# Patient Record
Sex: MALE | Race: WHITE | NOT HISPANIC OR LATINO | URBAN - METROPOLITAN AREA
[De-identification: names, ages, dates, MRNs, and addresses within clinical notes are randomized per-mention and may not be internally consistent; named-entity substitution may affect disease eponyms.]

---

## 2019-04-26 ENCOUNTER — ON CAMPUS - OUTPATIENT (AMBULATORY)
Dept: URBAN - METROPOLITAN AREA HOSPITAL 2 | Facility: HOSPITAL | Age: 54
End: 2019-04-26
Payer: COMMERCIAL

## 2019-04-26 VITALS
OXYGEN SATURATION: 98 % | HEART RATE: 70 BPM | HEART RATE: 89 BPM | DIASTOLIC BLOOD PRESSURE: 59 MMHG | DIASTOLIC BLOOD PRESSURE: 68 MMHG | SYSTOLIC BLOOD PRESSURE: 124 MMHG | DIASTOLIC BLOOD PRESSURE: 67 MMHG | TEMPERATURE: 97.2 F | DIASTOLIC BLOOD PRESSURE: 55 MMHG | SYSTOLIC BLOOD PRESSURE: 107 MMHG | WEIGHT: 176 LBS | SYSTOLIC BLOOD PRESSURE: 84 MMHG | DIASTOLIC BLOOD PRESSURE: 92 MMHG | RESPIRATION RATE: 18 BRPM | OXYGEN SATURATION: 96 % | SYSTOLIC BLOOD PRESSURE: 95 MMHG | HEART RATE: 82 BPM | SYSTOLIC BLOOD PRESSURE: 94 MMHG | HEART RATE: 72 BPM | RESPIRATION RATE: 16 BRPM | OXYGEN SATURATION: 100 % | HEIGHT: 72 IN | DIASTOLIC BLOOD PRESSURE: 83 MMHG | SYSTOLIC BLOOD PRESSURE: 132 MMHG | OXYGEN SATURATION: 99 % | HEART RATE: 88 BPM | HEART RATE: 84 BPM | HEART RATE: 62 BPM | SYSTOLIC BLOOD PRESSURE: 105 MMHG | DIASTOLIC BLOOD PRESSURE: 65 MMHG | SYSTOLIC BLOOD PRESSURE: 118 MMHG

## 2019-04-26 DIAGNOSIS — Z80.0 FAMILY HISTORY OF MALIGNANT NEOPLASM OF DIGESTIVE ORGANS: ICD-10-CM

## 2019-04-26 DIAGNOSIS — K64.1 SECOND DEGREE HEMORRHOIDS: ICD-10-CM

## 2019-04-26 PROCEDURE — 45378 DIAGNOSTIC COLONOSCOPY: CPT | Mod: 33 | Performed by: INTERNAL MEDICINE

## 2020-07-09 ENCOUNTER — APPOINTMENT (OUTPATIENT)
Dept: GENERAL RADIOLOGY | Facility: HOSPITAL | Age: 55
End: 2020-07-09

## 2020-07-09 ENCOUNTER — HOSPITAL ENCOUNTER (EMERGENCY)
Facility: HOSPITAL | Age: 55
Discharge: HOME | End: 2020-07-09
Admitting: EMERGENCY MEDICINE

## 2020-07-09 VITALS
HEART RATE: 83 BPM | HEIGHT: 72 IN | TEMPERATURE: 98.2 F | SYSTOLIC BLOOD PRESSURE: 132 MMHG | OXYGEN SATURATION: 99 % | BODY MASS INDEX: 23.44 KG/M2 | RESPIRATION RATE: 16 BRPM | DIASTOLIC BLOOD PRESSURE: 83 MMHG | WEIGHT: 173.06 LBS
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
## 2020-07-09 DIAGNOSIS — S30.0XXA CONTUSION OF SACRUM, INITIAL ENCOUNTER: ICD-10-CM

## 2020-07-09 PROCEDURE — 99283 EMERGENCY DEPT VISIT LOW MDM: CPT

## 2020-07-09 PROCEDURE — 72220 X-RAY EXAM SACRUM TAILBONE: CPT

## 2020-07-09 RX ORDER — LIDOCAINE 50 MG/G
1 PATCH TOPICAL ONCE
Status: DISCONTINUED | OUTPATIENT
Start: 2020-07-09 | End: 2020-07-09 | Stop reason: HOSPADM

## 2020-07-09 RX ADMIN — LIDOCAINE 1 PATCH: 50 PATCH TOPICAL at 19:11

## 2020-07-09 NOTE — DISCHARGE INSTRUCTIONS
Continue to take over-the-counter Motrin Tylenol for pain as directed.  Apply ice every 2 hours while awake, on for 20 minutes.  Perform back exercises.  Follow-up with your primary care physician for recheck.  If symptoms persist, you may need physical therapy and/or referral for orthopedist.  If anytime you develop urinary or bowel incontinence, numbness in your perineum, prolonged erection return to the ER immediately.

## 2020-07-09 NOTE — ED PROVIDER NOTES
"Subjective   55-year-old male presents status post fall approximately 2 hours prior to arrival after \"slipped on a wet garage floor\".  He reports right SI joint pain with a small deformity under the joint line.  He denies previous fracture to his pelvis.  He denies head nor neck injury.  Denies saddle paresthesia nor urinary bowel incontinence.  Reports the pain is worse with weightbearing at a 4 out of 10 on the pain scale.  He reports that he took Advil 800 mg prior to arrival as well as ice with minimal relief.    1. Location: R SI joint  2. Quality: sore  3. Severity: moderate  4. Worsening factors: weight-bearing  5. Alleviating factors: denies  6. Onset: 2 hours PTA  7. Radiation: posterior thigh  8. Frequency: intermittent  9. Co-morbidities: No past medical history on file.  10. Source: patient            Review of Systems   Musculoskeletal: Positive for arthralgias and gait problem. Negative for myalgias.   Skin: Negative for color change, pallor, rash and wound.   Neurological: Negative for weakness and numbness.   Hematological: Does not bruise/bleed easily.   All other systems reviewed and are negative.      No past medical history on file.    No Known Allergies    No past surgical history on file.    No family history on file.    Social History     Socioeconomic History   • Marital status: Single     Spouse name: Not on file   • Number of children: Not on file   • Years of education: Not on file   • Highest education level: Not on file   Tobacco Use   • Smoking status: Never Smoker   Substance and Sexual Activity   • Drug use: Never   • Sexual activity: Defer           Objective   Physical Exam   Constitutional: He is oriented to person, place, and time. Vital signs are normal. He appears well-developed and well-nourished. He is active and cooperative. No distress.   HENT:   Head: Normocephalic and atraumatic. Head is without raccoon's eyes and without Sauceda's sign.   Right Ear: External ear normal. No " drainage.   Left Ear: External ear normal. No drainage.   Nose: Nose normal.   Neck: Trachea normal, normal range of motion, full passive range of motion without pain and phonation normal. Neck supple.   Cardiovascular: Intact distal pulses and normal pulses.   Pulses:       Dorsalis pedis pulses are 2+ on the right side, and 2+ on the left side.        Posterior tibial pulses are 2+ on the right side, and 2+ on the left side.   Musculoskeletal: Normal range of motion. He exhibits tenderness. He exhibits no edema.        Lumbar back: He exhibits tenderness, bony tenderness, swelling, deformity and pain. He exhibits normal range of motion, no edema, no laceration, no spasm and normal pulse.        Back:    Small deformity noted under the right sacroiliac joint.  There is no overlying erythema nor ecchymosis noted.  Distal pulses strong and equal bilaterally 2+.    Neurological: He is alert and oriented to person, place, and time. He has normal strength and normal reflexes. No sensory deficit. He exhibits normal muscle tone.   Reflex Scores:       Patellar reflexes are 2+ on the right side and 2+ on the left side.  Skin: Skin is warm and dry. Capillary refill takes less than 2 seconds. No pallor.   Psychiatric: He has a normal mood and affect. His behavior is normal. Judgment and thought content normal.   Nursing note and vitals reviewed.      Procedures           ED Course      Xr Sacrum & Coccyx    Result Date: 7/9/2020  No acute fracture or traumatic malalignment identified.  Electronically Signed By-DR. Flip Saha MD On:7/9/2020 6:35 PM This report was finalized on [id number] by DR. Flip Saha MD.    Medications   lidocaine (LIDODERM) 5 % 1 patch (1 patch Transdermal Medication Applied 7/9/20 1911)     Labs Reviewed - No data to display                                       MDM  Number of Diagnoses or Management Options  Contusion of sacrum, initial encounter:   Fall, initial encounter:   Diagnosis  "management comments: Chart Review: 5/18/2016 patient was seen at the urgent care center for ear pain.  Comorbidity: No past medical history on file.  Imaging: Was interpreted by physician and reviewed by myself: Xr Sacrum & Coccyx    Result Date: 7/9/2020  No acute fracture or traumatic malalignment identified.  Electronically Signed By-DR. Flip Saha MD On:7/9/2020 6:35 PM This report was finalized on [id number] by DR. Flip Saha MD.    Patient undressed and placed in gown for exam.  Appropriate PPE worn during patient exam. 55-year-old male presents status post fall approximately 2 hours prior to arrival after \"slipped on a wet garage floor\".  He reports right SI joint pain with a small deformity under the joint line.  He denies previous fracture to his pelvis.  He denies head nor neck injury.  Denies saddle paresthesia nor urinary bowel incontinence.  Reports the pain is worse with weightbearing at a 4 out of 10 on the pain scale.  He reports that he took Advil 800 mg prior to arrival as well as ice with minimal relief.  Offered analgesia, patient declined.  X-ray obtained of the sacrum, which did show some bony step-off of the inferior coccyx but nothing at the right sacroiliac joint.  Patient was given a Lidoderm patch.  He declined offer for prescription for the same reporting \"I will use the lidocaine pain cream that I have at home.\"  He was given follow-up with his primary care physician for recheck this week.  Is encouraged to do back exercises and drink lots of water.    Disposition/Treatment: Discussed results with patient, verbalized understanding.  Discussed reasons to return to the ER, patient verbalized understanding.  Agreeable with plan of care.  Patient was stable upon discharge.               Amount and/or Complexity of Data Reviewed  Tests in the radiology section of CPT®: reviewed    Patient Progress  Patient progress: stable      Final diagnoses:   Fall, initial encounter "   Contusion of sacrum, initial encounter            Seha Merino, NORBERT  07/09/20 1938

## 2023-12-24 ENCOUNTER — APPOINTMENT (OUTPATIENT)
Dept: GENERAL RADIOLOGY | Facility: HOSPITAL | Age: 58
End: 2023-12-24
Payer: COMMERCIAL

## 2023-12-24 ENCOUNTER — HOSPITAL ENCOUNTER (EMERGENCY)
Facility: HOSPITAL | Age: 58
Discharge: HOME | End: 2023-12-24
Attending: EMERGENCY MEDICINE | Admitting: EMERGENCY MEDICINE
Payer: COMMERCIAL

## 2023-12-24 VITALS
HEIGHT: 72 IN | SYSTOLIC BLOOD PRESSURE: 141 MMHG | DIASTOLIC BLOOD PRESSURE: 90 MMHG | BODY MASS INDEX: 23.7 KG/M2 | WEIGHT: 175 LBS | HEART RATE: 96 BPM | RESPIRATION RATE: 22 BRPM | OXYGEN SATURATION: 100 % | TEMPERATURE: 98.2 F

## 2023-12-24 DIAGNOSIS — R11.2 NAUSEA AND VOMITING, UNSPECIFIED VOMITING TYPE: Primary | ICD-10-CM

## 2023-12-24 DIAGNOSIS — K21.00 GASTROESOPHAGEAL REFLUX DISEASE WITH ESOPHAGITIS WITHOUT HEMORRHAGE: ICD-10-CM

## 2023-12-24 LAB
ALBUMIN SERPL-MCNC: 4.8 G/DL (ref 3.5–5.2)
ALBUMIN/GLOB SERPL: 1.7 G/DL
ALP SERPL-CCNC: 81 U/L (ref 39–117)
ALT SERPL W P-5'-P-CCNC: 15 U/L (ref 1–41)
ANION GAP SERPL CALCULATED.3IONS-SCNC: 17 MMOL/L (ref 5–15)
AST SERPL-CCNC: 27 U/L (ref 1–40)
BASOPHILS # BLD AUTO: 0.1 10*3/MM3 (ref 0–0.2)
BASOPHILS NFR BLD AUTO: 1.4 % (ref 0–1.5)
BILIRUB SERPL-MCNC: 0.6 MG/DL (ref 0–1.2)
BUN SERPL-MCNC: 16 MG/DL (ref 6–20)
BUN/CREAT SERPL: 13.4 (ref 7–25)
CALCIUM SPEC-SCNC: 9.8 MG/DL (ref 8.6–10.5)
CHLORIDE SERPL-SCNC: 101 MMOL/L (ref 98–107)
CO2 SERPL-SCNC: 22 MMOL/L (ref 22–29)
CREAT SERPL-MCNC: 1.19 MG/DL (ref 0.76–1.27)
DEPRECATED RDW RBC AUTO: 43.3 FL (ref 37–54)
EGFRCR SERPLBLD CKD-EPI 2021: 70.4 ML/MIN/1.73
EOSINOPHIL # BLD AUTO: 0.1 10*3/MM3 (ref 0–0.4)
EOSINOPHIL NFR BLD AUTO: 2 % (ref 0.3–6.2)
ERYTHROCYTE [DISTWIDTH] IN BLOOD BY AUTOMATED COUNT: 13.8 % (ref 12.3–15.4)
ETHANOL UR QL: <0.01 %
GLOBULIN UR ELPH-MCNC: 2.9 GM/DL
GLUCOSE SERPL-MCNC: 116 MG/DL (ref 65–99)
HCT VFR BLD AUTO: 40.9 % (ref 37.5–51)
HGB BLD-MCNC: 14.1 G/DL (ref 13–17.7)
HOLD SPECIMEN: NORMAL
LYMPHOCYTES # BLD AUTO: 1.4 10*3/MM3 (ref 0.7–3.1)
LYMPHOCYTES NFR BLD AUTO: 19.8 % (ref 19.6–45.3)
MCH RBC QN AUTO: 31.6 PG (ref 26.6–33)
MCHC RBC AUTO-ENTMCNC: 34.4 G/DL (ref 31.5–35.7)
MCV RBC AUTO: 91.9 FL (ref 79–97)
MONOCYTES # BLD AUTO: 0.5 10*3/MM3 (ref 0.1–0.9)
MONOCYTES NFR BLD AUTO: 6.7 % (ref 5–12)
NEUTROPHILS NFR BLD AUTO: 4.8 10*3/MM3 (ref 1.7–7)
NEUTROPHILS NFR BLD AUTO: 70.1 % (ref 42.7–76)
NRBC BLD AUTO-RTO: 0 /100 WBC (ref 0–0.2)
NT-PROBNP SERPL-MCNC: 56.8 PG/ML (ref 0–900)
PLATELET # BLD AUTO: 345 10*3/MM3 (ref 140–450)
PMV BLD AUTO: 7.2 FL (ref 6–12)
POTASSIUM SERPL-SCNC: 3.5 MMOL/L (ref 3.5–5.2)
PROT SERPL-MCNC: 7.7 G/DL (ref 6–8.5)
QT INTERVAL: 373 MS
QTC INTERVAL: 472 MS
RBC # BLD AUTO: 4.45 10*6/MM3 (ref 4.14–5.8)
SODIUM SERPL-SCNC: 140 MMOL/L (ref 136–145)
TROPONIN T SERPL HS-MCNC: 11 NG/L
WBC NRBC COR # BLD AUTO: 6.8 10*3/MM3 (ref 3.4–10.8)
WHOLE BLOOD HOLD COAG: NORMAL

## 2023-12-24 PROCEDURE — 82077 ASSAY SPEC XCP UR&BREATH IA: CPT | Performed by: NURSE PRACTITIONER

## 2023-12-24 PROCEDURE — 71045 X-RAY EXAM CHEST 1 VIEW: CPT

## 2023-12-24 PROCEDURE — 85025 COMPLETE CBC W/AUTO DIFF WBC: CPT | Performed by: NURSE PRACTITIONER

## 2023-12-24 PROCEDURE — 25010000002 ONDANSETRON PER 1 MG: Performed by: NURSE PRACTITIONER

## 2023-12-24 PROCEDURE — 83880 ASSAY OF NATRIURETIC PEPTIDE: CPT | Performed by: NURSE PRACTITIONER

## 2023-12-24 PROCEDURE — 99284 EMERGENCY DEPT VISIT MOD MDM: CPT

## 2023-12-24 PROCEDURE — 96374 THER/PROPH/DIAG INJ IV PUSH: CPT

## 2023-12-24 PROCEDURE — 80053 COMPREHEN METABOLIC PANEL: CPT | Performed by: NURSE PRACTITIONER

## 2023-12-24 PROCEDURE — 93005 ELECTROCARDIOGRAM TRACING: CPT

## 2023-12-24 PROCEDURE — 84484 ASSAY OF TROPONIN QUANT: CPT | Performed by: NURSE PRACTITIONER

## 2023-12-24 RX ORDER — ONDANSETRON 4 MG/1
4 TABLET, ORALLY DISINTEGRATING ORAL 4 TIMES DAILY PRN
Qty: 10 TABLET | Refills: 0 | Status: SHIPPED | OUTPATIENT
Start: 2023-12-24

## 2023-12-24 RX ORDER — SODIUM CHLORIDE 0.9 % (FLUSH) 0.9 %
10 SYRINGE (ML) INJECTION AS NEEDED
Status: DISCONTINUED | OUTPATIENT
Start: 2023-12-24 | End: 2023-12-24 | Stop reason: HOSPADM

## 2023-12-24 RX ORDER — LIDOCAINE HYDROCHLORIDE 20 MG/ML
10 SOLUTION OROPHARYNGEAL ONCE
Status: COMPLETED | OUTPATIENT
Start: 2023-12-24 | End: 2023-12-24

## 2023-12-24 RX ORDER — ONDANSETRON 2 MG/ML
4 INJECTION INTRAMUSCULAR; INTRAVENOUS ONCE
Status: COMPLETED | OUTPATIENT
Start: 2023-12-24 | End: 2023-12-24

## 2023-12-24 RX ORDER — ALUMINA, MAGNESIA, AND SIMETHICONE 2400; 2400; 240 MG/30ML; MG/30ML; MG/30ML
5 SUSPENSION ORAL ONCE
Status: COMPLETED | OUTPATIENT
Start: 2023-12-24 | End: 2023-12-24

## 2023-12-24 RX ORDER — OMEPRAZOLE 20 MG/1
20 CAPSULE, DELAYED RELEASE ORAL DAILY
Qty: 20 CAPSULE | Refills: 0 | Status: SHIPPED | OUTPATIENT
Start: 2023-12-24

## 2023-12-24 RX ADMIN — LIDOCAINE HYDROCHLORIDE 10 ML: 20 SOLUTION ORAL; TOPICAL at 19:39

## 2023-12-24 RX ADMIN — ALUMINUM HYDROXIDE, MAGNESIUM HYDROXIDE, AND DIMETHICONE 5 ML: 400; 400; 40 SUSPENSION ORAL at 19:39

## 2023-12-24 RX ADMIN — ONDANSETRON 4 MG: 2 INJECTION INTRAMUSCULAR; INTRAVENOUS at 20:31

## 2023-12-25 NOTE — ED PROVIDER NOTES
Subjective   History of Present Illness  Patient is a 58-year-old gentleman who comes in after having 3 or 4 beers prior to arrival stating that he developed hiccups about 1 to 2 hours ago and then developed pain in his epigastric region and states that he has a lot of anxiety and has a lot going on at home with increased stress and comes in also complaining of some numbness and tingling in his hands but he is hyperventilating at the time of my exam.    It was noted that the patient has not had any hiccups since being placed in the ED room      Review of Systems   Constitutional:  Negative for chills, fatigue and fever.   HENT:  Negative for congestion, tinnitus and trouble swallowing.    Eyes:  Negative for photophobia, discharge and redness.   Respiratory:  Negative for cough and shortness of breath.    Cardiovascular:  Negative for chest pain and palpitations.   Gastrointestinal:  Positive for abdominal pain. Negative for diarrhea, nausea and vomiting.   Genitourinary:  Negative for dysuria, frequency and urgency.   Musculoskeletal:  Negative for back pain, joint swelling and myalgias.   Skin:  Negative for rash.   Neurological:  Negative for dizziness and headaches.        Hiccups   Psychiatric/Behavioral:  Negative for confusion. The patient is nervous/anxious.    All other systems reviewed and are negative.      History reviewed. No pertinent past medical history.    No Known Allergies    History reviewed. No pertinent surgical history.    History reviewed. No pertinent family history.    Social History     Socioeconomic History    Marital status: Single   Tobacco Use    Smoking status: Never   Substance and Sexual Activity    Drug use: Never    Sexual activity: Defer           Objective   Physical Exam  Vitals reviewed.   Constitutional:       General: He is not in acute distress.     Appearance: He is well-developed. He is not ill-appearing, toxic-appearing or diaphoretic.   HENT:      Head: Normocephalic  and atraumatic.   Eyes:      Conjunctiva/sclera: Conjunctivae normal.      Pupils: Pupils are equal, round, and reactive to light.   Cardiovascular:      Rate and Rhythm: Normal rate and regular rhythm.      Pulses:           Carotid pulses are 2+ on the right side and 2+ on the left side.       Radial pulses are 2+ on the right side and 2+ on the left side.        Dorsalis pedis pulses are 2+ on the right side and 2+ on the left side.        Posterior tibial pulses are 2+ on the right side and 2+ on the left side.      Heart sounds: Normal heart sounds.   Pulmonary:      Effort: Pulmonary effort is normal.      Breath sounds: Normal breath sounds. No decreased breath sounds.   Abdominal:      General: Bowel sounds are normal.      Palpations: Abdomen is soft.   Musculoskeletal:         General: Normal range of motion.      Cervical back: Normal range of motion and neck supple.      Right lower leg: No tenderness. No edema.      Left lower leg: No edema.   Skin:     General: Skin is warm and dry.      Capillary Refill: Capillary refill takes less than 2 seconds.   Neurological:      General: No focal deficit present.      Mental Status: He is alert and oriented to person, place, and time.      GCS: GCS eye subscore is 4. GCS verbal subscore is 5. GCS motor subscore is 6.   Psychiatric:         Attention and Perception: Attention normal.         Mood and Affect: Mood is anxious.         Speech: Speech normal.         Behavior: Behavior normal. Behavior is cooperative.         Cognition and Memory: Cognition and memory normal.         Procedures       Patient's EKG was reviewed by myself as well as Dr. Crum as sinus rhythm with a rate of 96 no ectopy no ST elevation no previous for comparison    ED Course  ED Course as of 12/24/23 2046   Sun Dec 24, 2023   2027 Was discussing the findings with the patient and his family at bedside when the patient continued to complain of epigastric discomfort and then he had a  "large emesis consisting of about 1000 cc and once complete the patient states he feels perfect and has no pain and is feeling much better and is ready to go home [KW]      ED Course User Index  [KW] Mariella Mendoza, APRN                                   /90 (BP Location: Right arm)   Pulse 96   Temp 98.2 °F (36.8 °C) (Oral)   Resp 22   Ht 182.9 cm (72\")   Wt 79.4 kg (175 lb)   SpO2 100%   BMI 23.73 kg/m²   Labs Reviewed   COMPREHENSIVE METABOLIC PANEL - Abnormal; Notable for the following components:       Result Value    Glucose 116 (*)     Anion Gap 17.0 (*)     All other components within normal limits    Narrative:     GFR Normal >60  Chronic Kidney Disease <60  Kidney Failure <15     BNP (IN-HOUSE) - Normal    Narrative:     This assay is used as an aid in the diagnosis of individuals suspected of having heart failure. It can be used as an aid in the diagnosis of acute decompensated heart failure (ADHF) in patients presenting with signs and symptoms of ADHF to the emergency department (ED). In addition, NT-proBNP of <300 pg/mL indicates ADHF is not likely.    Age Range Result Interpretation  NT-proBNP Concentration (pg/mL:      <50             Positive            >450                   Gray                 300-450                    Negative             <300    50-75           Positive            >900                  Gray                300-900                  Negative            <300      >75             Positive            >1800                  Gray                300-1800                  Negative            <300   SINGLE HSTROPONIN T - Normal    Narrative:     High Sensitive Troponin T Reference Range:  <14.0 ng/L- Negative Female for AMI  <22.0 ng/L- Negative Male for AMI  >=14 - Abnormal Female indicating possible myocardial injury.  >=22 - Abnormal Male indicating possible myocardial injury.   Clinicians would have to utilize clinical acumen, EKG, Troponin, and serial changes to " determine if it is an Acute Myocardial Infarction or myocardial injury due to an underlying chronic condition.        CBC WITH AUTO DIFFERENTIAL - Normal   ETHANOL    Narrative:     Plasma Ethanol Clinical Symptoms:    ETOH (%)               Clinical Symptom  .01-.05              No apparent influence  .03-.12              Euphoria, Diminished judgment and attention   .09-.25              Impaired comprehension, Muscle incoordination  .18-.30              Confusion, Staggered gait, Slurred speech  .25-.40              Markedly decreased response to stimuli, unable to stand or                        walk, vomitting, sleep or stupor  .35-.50              Comatose, Anesthesia, Subnormal body temperature       CBC AND DIFFERENTIAL    Narrative:     The following orders were created for panel order CBC & Differential.  Procedure                               Abnormality         Status                     ---------                               -----------         ------                     CBC Auto Differential[[id number]]        Normal              Final result                 Please view results for these tests on the individual orders.   EXTRA TUBES    Narrative:     The following orders were created for panel order Extra Tubes.  Procedure                               Abnormality         Status                     ---------                               -----------         ------                     Gold Top - SST[[id number]]                                   Final result               Light Blue Top[[id number]]                                   Final result                 Please view results for these tests on the individual orders.   GOLD TOP - SST   LIGHT BLUE TOP     Medications   sodium chloride 0.9 % flush 10 mL (has no administration in time range)   aluminum-magnesium hydroxide-simethicone (MAALOX MAX) 400-400-40 MG/5ML suspension 5 mL (5 mL Oral Given 12/24/23 1939)   Lidocaine Viscous HCl (XYLOCAINE) 2 %  solution 10 mL (10 mL Mouth/Throat Given 12/24/23 1939)   ondansetron (ZOFRAN) injection 4 mg (4 mg Intravenous Given 12/24/23 2031)     XR Chest 1 View    Result Date: 12/24/2023  No acute cardiopulmonary abnormality. Electronically Signed: Anne Suarez MD  12/24/2023 7:55 PM EST  Workstation ID: [id number]             Medical Decision Making  Patient is a 58-year-old gentleman who states he had hiccups for about an hour and a half prior to arrival who comes in with epigastric discomfort and intermittent hiccuping.  Patient had IV established and blood work was obtained and reviewed and interpreted by myself as essentially normal the patient had a large emesis while here in the emergency room and symptoms completely resolved after that the patient states he feels much better his symptoms are completely resolved and he will be discharged home he was given some Zofran prior to discharge she was sent home with some Zofran and Prilosec and to return if worse    Problems Addressed:  Gastroesophageal reflux disease with esophagitis without hemorrhage: complicated acute illness or injury  Nausea and vomiting, unspecified vomiting type: complicated acute illness or injury    Amount and/or Complexity of Data Reviewed  Labs: ordered. Decision-making details documented in ED Course.  Radiology: ordered and independent interpretation performed. Decision-making details documented in ED Course.  ECG/medicine tests: ordered and independent interpretation performed. Decision-making details documented in ED Course.    Risk  OTC drugs.  Prescription drug management.        Final diagnoses:   Nausea and vomiting, unspecified vomiting type   Gastroesophageal reflux disease with esophagitis without hemorrhage       ED Disposition  ED Disposition       ED Disposition   Discharge    Condition   Stable    Comment   --               Aristeo Rodriguez MD  [address] [facility] [place] [address] [phone]    In 3 days  If symptoms worsen, As  needed         Medication List        New Prescriptions      omeprazole 20 MG capsule  Commonly known as: priLOSEC  Take 1 capsule by mouth Daily.     ondansetron ODT 4 MG disintegrating tablet  Commonly known as: ZOFRAN-ODT  Place 1 tablet under the tongue 4 (Four) Times a Day As Needed for Nausea or Vomiting.               Where to Get Your Medications        These medications were sent to [id number] - [place], [place] - [address]  - [phone]  - [phone] FX  [address] [place] [address]      Phone: [phone]   omeprazole 20 MG capsule  ondansetron ODT 4 MG disintegrating tablet            Mariella Mendoza, STEVEN  12/24/23 2031       Mariella Mendoza, STEVEN  12/24/23 2046

## 2023-12-25 NOTE — DISCHARGE INSTRUCTIONS
Decrease the fried fatty spicy foods in your diet    Use Zofran as needed for nausea and vomiting    Prilosec to help with the GERD symptoms    Follow-up with Dr. Barahona for any further problems or return if worse

## 2024-01-09 ENCOUNTER — OFFICE (AMBULATORY)
Dept: URBAN - METROPOLITAN AREA CLINIC 64 | Facility: CLINIC | Age: 59
End: 2024-01-09

## 2024-01-09 VITALS
WEIGHT: 178 LBS | SYSTOLIC BLOOD PRESSURE: 134 MMHG | DIASTOLIC BLOOD PRESSURE: 84 MMHG | HEIGHT: 72 IN | HEART RATE: 82 BPM

## 2024-01-09 DIAGNOSIS — Z80.0 FAMILY HISTORY OF MALIGNANT NEOPLASM OF DIGESTIVE ORGANS: ICD-10-CM

## 2024-01-09 DIAGNOSIS — R10.13 EPIGASTRIC PAIN: ICD-10-CM

## 2024-01-09 DIAGNOSIS — R06.6 HICCOUGH: ICD-10-CM

## 2024-01-09 PROCEDURE — 99204 OFFICE O/P NEW MOD 45 MIN: CPT | Performed by: INTERNAL MEDICINE

## 2024-01-09 RX ORDER — ESOMEPRAZOLE MAGNESIUM 40 MG/1
80 CAPSULE, DELAYED RELEASE ORAL
Qty: 30 | Refills: 11 | Status: COMPLETED
End: 2024-03-29

## 2024-01-29 ENCOUNTER — ANESTHESIA (OUTPATIENT)
Dept: GASTROENTEROLOGY | Facility: HOSPITAL | Age: 59
End: 2024-01-29
Payer: COMMERCIAL

## 2024-01-29 ENCOUNTER — ANESTHESIA EVENT (OUTPATIENT)
Dept: GASTROENTEROLOGY | Facility: HOSPITAL | Age: 59
End: 2024-01-29
Payer: COMMERCIAL

## 2024-01-29 ENCOUNTER — HOSPITAL ENCOUNTER (OUTPATIENT)
Facility: HOSPITAL | Age: 59
Setting detail: HOSPITAL OUTPATIENT SURGERY
Discharge: HOME | End: 2024-01-29
Attending: INTERNAL MEDICINE | Admitting: INTERNAL MEDICINE
Payer: COMMERCIAL

## 2024-01-29 ENCOUNTER — ON CAMPUS - OUTPATIENT (AMBULATORY)
Dept: URBAN - METROPOLITAN AREA HOSPITAL 85 | Facility: HOSPITAL | Age: 59
End: 2024-01-29
Payer: COMMERCIAL

## 2024-01-29 VITALS
RESPIRATION RATE: 15 BRPM | SYSTOLIC BLOOD PRESSURE: 129 MMHG | TEMPERATURE: 98.3 F | WEIGHT: 171.4 LBS | BODY MASS INDEX: 23.22 KG/M2 | HEART RATE: 73 BPM | DIASTOLIC BLOOD PRESSURE: 77 MMHG | HEIGHT: 72 IN | OXYGEN SATURATION: 98 %

## 2024-01-29 DIAGNOSIS — K22.2 ESOPHAGEAL OBSTRUCTION: ICD-10-CM

## 2024-01-29 DIAGNOSIS — R10.13 EPIGASTRIC PAIN: ICD-10-CM

## 2024-01-29 DIAGNOSIS — Z80.0 FAMILY HISTORY OF MALIGNANT NEOPLASM OF DIGESTIVE ORGANS: ICD-10-CM

## 2024-01-29 DIAGNOSIS — Z12.11 ENCOUNTER FOR SCREENING FOR MALIGNANT NEOPLASM OF COLON: ICD-10-CM

## 2024-01-29 DIAGNOSIS — K29.50 UNSPECIFIED CHRONIC GASTRITIS WITHOUT BLEEDING: ICD-10-CM

## 2024-01-29 DIAGNOSIS — R06.6 HICCUPS: ICD-10-CM

## 2024-01-29 DIAGNOSIS — D12.2 BENIGN NEOPLASM OF ASCENDING COLON: ICD-10-CM

## 2024-01-29 DIAGNOSIS — Z80.0 FAMILY HISTORY OF COLON CANCER: ICD-10-CM

## 2024-01-29 PROCEDURE — 43450 DILATE ESOPHAGUS 1/MULT PASS: CPT | Performed by: INTERNAL MEDICINE

## 2024-01-29 PROCEDURE — 88305 TISSUE EXAM BY PATHOLOGIST: CPT | Performed by: INTERNAL MEDICINE

## 2024-01-29 PROCEDURE — 25010000002 PROPOFOL 200 MG/20ML EMULSION: Performed by: NURSE ANESTHETIST, CERTIFIED REGISTERED

## 2024-01-29 PROCEDURE — 43239 EGD BIOPSY SINGLE/MULTIPLE: CPT | Performed by: INTERNAL MEDICINE

## 2024-01-29 PROCEDURE — 45385 COLONOSCOPY W/LESION REMOVAL: CPT | Mod: 33 | Performed by: INTERNAL MEDICINE

## 2024-01-29 PROCEDURE — 25810000003 SODIUM CHLORIDE 0.9 % SOLUTION: Performed by: NURSE ANESTHETIST, CERTIFIED REGISTERED

## 2024-01-29 RX ORDER — LIDOCAINE HYDROCHLORIDE 20 MG/ML
INJECTION, SOLUTION EPIDURAL; INFILTRATION; INTRACAUDAL; PERINEURAL AS NEEDED
Status: DISCONTINUED | OUTPATIENT
Start: 2024-01-29 | End: 2024-01-29 | Stop reason: SURG

## 2024-01-29 RX ORDER — PROPOFOL 10 MG/ML
INJECTION, EMULSION INTRAVENOUS AS NEEDED
Status: DISCONTINUED | OUTPATIENT
Start: 2024-01-29 | End: 2024-01-29 | Stop reason: SURG

## 2024-01-29 RX ORDER — SODIUM CHLORIDE 9 MG/ML
INJECTION, SOLUTION INTRAVENOUS CONTINUOUS PRN
Status: DISCONTINUED | OUTPATIENT
Start: 2024-01-29 | End: 2024-01-29 | Stop reason: SURG

## 2024-01-29 RX ORDER — ONDANSETRON 2 MG/ML
4 INJECTION INTRAMUSCULAR; INTRAVENOUS ONCE AS NEEDED
Status: DISCONTINUED | OUTPATIENT
Start: 2024-01-29 | End: 2024-01-29 | Stop reason: HOSPADM

## 2024-01-29 RX ADMIN — SODIUM CHLORIDE: 9 INJECTION, SOLUTION INTRAVENOUS at 09:08

## 2024-01-29 RX ADMIN — PROPOFOL 50 MG: 10 INJECTION, EMULSION INTRAVENOUS at 09:26

## 2024-01-29 RX ADMIN — PROPOFOL 150 MG: 10 INJECTION, EMULSION INTRAVENOUS at 09:13

## 2024-01-29 RX ADMIN — PROPOFOL 20 MG: 10 INJECTION, EMULSION INTRAVENOUS at 09:29

## 2024-01-29 RX ADMIN — LIDOCAINE HYDROCHLORIDE 50 MG: 20 INJECTION, SOLUTION EPIDURAL; INFILTRATION; INTRACAUDAL; PERINEURAL at 09:13

## 2024-01-29 RX ADMIN — PROPOFOL 50 MG: 10 INJECTION, EMULSION INTRAVENOUS at 09:20

## 2024-01-29 RX ADMIN — PROPOFOL 50 MG: 10 INJECTION, EMULSION INTRAVENOUS at 09:16

## 2024-01-29 NOTE — ANESTHESIA PREPROCEDURE EVALUATION
Anesthesia Evaluation     Patient summary reviewed and Nursing notes reviewed   no history of anesthetic complications:   NPO Solid Status: > 8 hours  NPO Liquid Status: > 8 hours           Airway   Mallampati: II  TM distance: >3 FB  Neck ROM: full  Dental - normal exam     Pulmonary - normal exam   (-) not a smoker  Cardiovascular - normal exam  Exercise tolerance: good (4-7 METS)    ECG reviewed        Neuro/Psych- negative ROS  GI/Hepatic/Renal/Endo    (+) GERD    Musculoskeletal (-) negative ROS    Abdominal    Substance History - negative use     OB/GYN negative ob/gyn ROS         Other - negative ROS                     Anesthesia Plan    ASA 2     general   total IV anesthesia  intravenous induction     Anesthetic plan, risks, benefits, and alternatives have been provided, discussed and informed consent has been obtained with: patient.    Plan discussed with CRNA and CAA.    CODE STATUS:

## 2024-01-29 NOTE — ANESTHESIA POSTPROCEDURE EVALUATION
Patient: Flip Vang    Procedure Summary       Date: 01/29/24 Room / Location: [facility] ENDOSCOPY 1 / [facility] ENDOSCOPY    Anesthesia Start: 0908 Anesthesia Stop: 0939    Procedures:       ESOPHAGOGASTRODUODENOSCOPY WITH BIOPSY X1 AREA AND DILATION UP TO 50      COLONOSCOPY WITH POLYPECTOMY X1 Diagnosis:       Epigastric pain      Hiccups      Family history of colon cancer      (Epigastric pain [R10.13])      (Hiccups [R06.6])      (Family history of colon cancer [Z80.0])    Surgeons: Tre Fitzpatrick MD Provider: Brianna Bryant MD    Anesthesia Type: general ASA Status: 2            Anesthesia Type: general    Vitals  Vitals Value Taken Time   /77 01/29/24 0954   Temp     Pulse 74 01/29/24 0957   Resp 15 01/29/24 0954   SpO2 99 % 01/29/24 0957   Vitals shown include unfiled device data.        Post Anesthesia Care and Evaluation    Patient location during evaluation: PACU  Patient participation: complete - patient participated  Level of consciousness: awake and alert  Pain management: satisfactory to patient    Airway patency: patent  Anesthetic complications: No anesthetic complications  PONV Status: none  Cardiovascular status: acceptable  Respiratory status: acceptable  Hydration status: acceptable

## 2024-01-29 NOTE — OP NOTE
ESOPHAGOGASTRODUODENOSCOPY, COLONOSCOPY Procedure Report    Patient Name:  Flip Vang  YOB: 1965    Date of Surgery:  1/29/2024     Pre-Op Diagnosis:  Epigastric pain [R10.13]  Hiccups [R06.6]  Family history of colon cancer [Z80.0]       Post-Op Diagnosis Codes:     * Epigastric pain [R10.13]     * Hiccups [R06.6]     * Family history of colon cancer [Z80.0]    Post-Op Diagnosis:  1.  Esophageal stricture status post 50 Norwegian Murray dilation  2.  Erosive gastritis  3.  Ascending colon polyp    Procedure/CPT® Codes:        Staff:  Surgeon(s):  Tre Fitzpatrick MD         Anesthesia: Monitored Anesthesia Care    Implants:    Nothing was implanted during the procedure    Specimen:        See Below    Complications:  None    Description of Procedure:  Informed consent was obtained for the procedure, including sedation.  Risks of perforation, hemorrhage, adverse drug reaction and aspiration were discussed.  The patient was brought into the endoscopy suite. Continuous cardiopulmonary monitoring was performed. The patient was placed in the left lateral decubitus position.  The bite block was inserted into the patient's mouth. After adequate sedation was attained, the Olympus gastroscope was inserted into the patient's mouth and advanced to the second portion of the duodenum without difficulty.  Circumferential examination was performed. A retroflex exam was performed in the patient's stomach.  On completion of the exam, the bowel was decompressed, the scope was removed from the patient, the patient tolerated the procedure well, there were no immediate post-operative complications.  There was no blood loss.      Examination of the esophagus: Mild stricturing of the GE junction noted.  44 Norwegian and 50 Norwegian Murray dilators passed with mild resistance felt and effective mucosal dilation noted.  Examination of the stomach: Erythema with erosions in the antrum and body consistent with erosive  gastritis.  Biopsies taken to rule out H. pylori.  Examination of the duodenum: Normal    Subsequently,  the Olympus colonoscope was inserted into the patient's rectum and advanced to the level of the cecum and terminal ileum without difficulty.  The bowel prep was excellent.  Circumferential examination of the patient's colon was performed on scope withdrawal.  The cecum, ascending colon, and hepatic flexure were examined twice.  The transverse colon, splenic flexure, descending colon, and rectum were examined.  A retroflex exam was performed in the rectum.  The bowel was decompressed, the scope was withdrawn from the patient, and the patient tolerated the procedure well. There were no immediate post-operative complications.  There was no blood loss.     Findings:   Terminal ileum: Normal  Colon: 4 mm sessile ascending colon polyp removed with cold snare polypectomy.    Impression:  1.  Esophageal stricture status post 50 Angolan Murray dilation  2.  Erosive gastritis  3.  Ascending colon polyp    Recommendations:  1.  Continue proton pump inhibitor daily  2.  Follow-up on gastric biopsies and treat for H. pylori if indicated  3.  Repeat colonoscopy in 5 years      Tre Fitzpatrick MD     Date: 1/29/2024  Time: 09:41 EST

## 2024-01-29 NOTE — H&P
GI PREOPERATIVE HISTORY AND PHYSICAL:    Referring Provider:    Aristeo Rodriguez MD    Chief complaint: Epigastric pain, family history of colon cancer    Subjective .     History of present illness:      Flip Vang is a 58 y.o. male who presents today for Procedure(s):  ESOPHAGOGASTRODUODENOSCOPY  COLONOSCOPY for the indications listed below.     Epigastric pain, family history of colon cancer    The updated Patient Profile was reviewed prior to the procedure, in conjunction with the Physical Exam, including medical conditions, surgical procedures, medications, allergies, family history and social history.     Pre-operatively, I reviewed the indication(s) for the procedure, the risks of the procedure [including but not limited to: unexpected bleeding possibly requiring hospitalization and/or unplanned repeat procedures, perforation possibly requiring surgical treatment, missed lesions and complications of sedation/MAC (also explained by anesthesia staff)].     I have evaluated the patient for risks associated with the planned anesthesia and the procedure to be performed and find the patient an acceptable candidate for IV sedation.    Multiple opportunities were provided for any questions or concerns, and all questions were answered satisfactorily before any anesthesia was administered. We will proceed with the planned procedure.    Past Medical History:  History reviewed. No pertinent past medical history.    Past Surgical History:  Past Surgical History:   Procedure Laterality Date    COLONOSCOPY         Social History:  Social History     Tobacco Use    Smoking status: Never   Substance Use Topics    Alcohol use: Yes     Comment: Occasional    Drug use: Never       Family History:  Family History   Problem Relation Age of Onset    Cancer Father        Medications:  No medications prior to admission.       Scheduled Meds:  Continuous Infusions:No current facility-administered medications for this  "encounter.    PRN Meds:.    ALLERGIES:  Patient has no known allergies.    ROS:  The following systems were reviewed and negative;   Constitution:  No fevers, chills, no unintentional weight loss  Skin: no rash, no jaundice  Eyes:  No blurry vision, no eye pain  HENT:  No change in hearing or smell  Resp:  No dyspnea or cough  CV:  No chest pain or palpitations  :  No dysuria, hematuria  Musculoskeletal:  No leg cramps or arthralgias  Neuro:  No tremor, no numbness  Psych:  No depression or confsuion    Objective     Vital Signs:   Vitals:    01/22/24 1529   Weight: 79.4 kg (175 lb)   Height: 182.9 cm (72\")       Physical Exam:       General Appearance:    Awake and alert, in no acute distress   Head:    Normocephalic, without obvious abnormality, atraumatic   Throat:   No oral lesions, no thrush, oral mucosa moist   Lungs  Cardiac:  Abdomen:  Extremities:     Respirations regular, even and unlabored    Regular rate and rhythm, no murmur, gallop, rub    Non-distended, good bowel sounds, non tender, no masses     No edema, pulses 2+   Skin:   No rash, no jaundice       Results Review:  Lab Results (last 24 hours)       ** No results found for the last 24 hours. **            Imaging Results (Last 24 Hours)       ** No results found for the last 24 hours. **             I reviewed the patient's labs and imaging.    ASSESSMENT AND PLAN:  Epigastric pain, family history of colon cancer    Active Problems:    * No active hospital problems. *       Procedure(s):  ESOPHAGOGASTRODUODENOSCOPY  COLONOSCOPY      I discussed the patients findings and my recommendations with the patient.    Tre Fitzpatrick MD  01/29/24  06:59 EST    "

## 2024-01-29 NOTE — DISCHARGE INSTRUCTIONS
A responsible adult should stay with you and you should rest quietly for the rest of the day.    Do not drink alcohol, drive, operate any heavy machinery or power tools or make any legal/important decisions for the next 24 hours.     Progress your diet as tolerated.  If you begin to experience severe pain, increased shortness of breath, racing heartbeat or a fever above 101 F, seek immediate medical attention.     Follow up with MD as instructed. Call office for results in 3 to 5 days if needed. [phone]    Findings:   Terminal ileum: Normal  Colon: 4 mm sessile ascending colon polyp removed with cold snare polypectomy.     Impression:  1.  Esophageal stricture status post 50 [place] dilation  2.  Erosive gastritis  3.  Ascending colon polyp     Recommendations:  1.  Continue proton pump inhibitor daily  2.  Follow-up on gastric biopsies and treat for H. pylori if indicated  3.  Repeat colonoscopy in 5 years

## 2024-01-30 LAB
LAB AP CASE REPORT: NORMAL
PATH REPORT.FINAL DX SPEC: NORMAL
PATH REPORT.GROSS SPEC: NORMAL

## 2024-03-29 ENCOUNTER — OFFICE (AMBULATORY)
Dept: URBAN - METROPOLITAN AREA CLINIC 64 | Facility: CLINIC | Age: 59
End: 2024-03-29
Payer: COMMERCIAL

## 2024-03-29 VITALS
SYSTOLIC BLOOD PRESSURE: 120 MMHG | DIASTOLIC BLOOD PRESSURE: 72 MMHG | WEIGHT: 172 LBS | HEIGHT: 72 IN | HEART RATE: 73 BPM

## 2024-03-29 DIAGNOSIS — Z86.010 PERSONAL HISTORY OF COLONIC POLYPS: ICD-10-CM

## 2024-03-29 DIAGNOSIS — K29.00 ACUTE GASTRITIS WITHOUT BLEEDING: ICD-10-CM

## 2024-03-29 PROCEDURE — 99214 OFFICE O/P EST MOD 30 MIN: CPT | Performed by: NURSE PRACTITIONER

## 2024-03-29 RX ORDER — SUCRALFATE 1 G/10ML
800 SUSPENSION ORAL
Qty: 800 | Refills: 0 | Status: ACTIVE
Start: 2024-03-29

## 2024-03-29 RX ORDER — ESOMEPRAZOLE MAGNESIUM 40 MG/1
80 CAPSULE, DELAYED RELEASE ORAL
Qty: 30 | Refills: 11 | Status: COMPLETED
End: 2024-03-29

## 2024-03-29 RX ORDER — DEXLANSOPRAZOLE 60 MG/1
60 CAPSULE, DELAYED RELEASE ORAL
Qty: 90 | Refills: 3 | Status: ACTIVE
Start: 2024-03-29

## 2024-08-12 ENCOUNTER — TRANSCRIBE ORDERS (OUTPATIENT)
Dept: ADMINISTRATIVE | Facility: HOSPITAL | Age: 59
End: 2024-08-12
Payer: COMMERCIAL

## 2024-08-12 DIAGNOSIS — Z13.6 SCREENING FOR CARDIOVASCULAR CONDITION: Primary | ICD-10-CM

## 2024-10-22 ENCOUNTER — HOSPITAL ENCOUNTER (OUTPATIENT)
Dept: CARDIOLOGY | Facility: HOSPITAL | Age: 59
Discharge: HOME | End: 2024-10-22

## 2024-10-22 ENCOUNTER — HOSPITAL ENCOUNTER (OUTPATIENT)
Dept: CT IMAGING | Facility: HOSPITAL | Age: 59
Discharge: HOME | End: 2024-10-22

## 2024-10-22 DIAGNOSIS — Z13.6 SCREENING FOR CARDIOVASCULAR CONDITION: ICD-10-CM

## 2024-10-22 LAB
BH CV VAS SCREENING CAROTID CCA LEFT: 78 CM/SEC
BH CV VAS SCREENING CAROTID CCA RIGHT: 71 CM/SEC
BH CV VAS SCREENING CAROTID ICA LEFT: 73 CM/SEC
BH CV VAS SCREENING CAROTID ICA RIGHT: 75 CM/SEC
BH CV XLRA MEAS - MID AO DIAM: 1.9 CM
BH CV XLRA MEAS - PAD LEFT ABI PT: 1.14
BH CV XLRA MEAS - PAD LEFT ARM: 133 MMHG
BH CV XLRA MEAS - PAD LEFT LEG PT: 159 MMHG
BH CV XLRA MEAS - PAD RIGHT ABI PT: 1.19
BH CV XLRA MEAS - PAD RIGHT ARM: 139 MMHG
BH CV XLRA MEAS - PAD RIGHT LEG PT: 166 MMHG
BH CV XLRA MEAS LEFT DIST CCA EDV: -23.6 CM/SEC
BH CV XLRA MEAS LEFT DIST CCA PSV: -77.7 CM/SEC
BH CV XLRA MEAS LEFT ICA/CCA RATIO: 0.9
BH CV XLRA MEAS LEFT PROX ICA EDV: -32.3 CM/SEC
BH CV XLRA MEAS LEFT PROX ICA PSV: -73.3 CM/SEC
BH CV XLRA MEAS RIGHT DIST CCA EDV: 22.4 CM/SEC
BH CV XLRA MEAS RIGHT DIST CCA PSV: 70.8 CM/SEC
BH CV XLRA MEAS RIGHT ICA/CCA RATIO: 1.1
BH CV XLRA MEAS RIGHT PROX ICA EDV: -31.1 CM/SEC
BH CV XLRA MEAS RIGHT PROX ICA PSV: -75.2 CM/SEC

## 2024-10-22 PROCEDURE — 93799 UNLISTED CV SVC/PROCEDURE: CPT

## 2024-10-22 PROCEDURE — 75571 CT HRT W/O DYE W/CA TEST: CPT

## 2025-03-03 ENCOUNTER — OFFICE (AMBULATORY)
Dept: URBAN - METROPOLITAN AREA CLINIC 64 | Facility: CLINIC | Age: 60
End: 2025-03-03
Payer: COMMERCIAL

## 2025-03-03 VITALS
HEIGHT: 72 IN | DIASTOLIC BLOOD PRESSURE: 88 MMHG | DIASTOLIC BLOOD PRESSURE: 86 MMHG | SYSTOLIC BLOOD PRESSURE: 152 MMHG | SYSTOLIC BLOOD PRESSURE: 148 MMHG | WEIGHT: 178 LBS | HEART RATE: 96 BPM

## 2025-03-03 DIAGNOSIS — R13.10 DYSPHAGIA, UNSPECIFIED: ICD-10-CM

## 2025-03-03 DIAGNOSIS — R49.0 DYSPHONIA: ICD-10-CM

## 2025-03-03 DIAGNOSIS — K21.9 GASTRO-ESOPHAGEAL REFLUX DISEASE WITHOUT ESOPHAGITIS: ICD-10-CM

## 2025-03-03 DIAGNOSIS — R09.A2 FOREIGN BODY SENSATION, THROAT: ICD-10-CM

## 2025-03-03 DIAGNOSIS — Z86.0101 PERSONAL HISTORY OF ADENOMATOUS AND SERRATED COLON POLYPS: ICD-10-CM

## 2025-03-03 PROCEDURE — 99213 OFFICE O/P EST LOW 20 MIN: CPT

## 2025-03-03 RX ORDER — OMEPRAZOLE 40 MG/1
80 CAPSULE, DELAYED RELEASE ORAL
Qty: 60 | Refills: 12 | Status: ACTIVE
Start: 2025-03-03

## 2025-05-14 ENCOUNTER — APPOINTMENT (OUTPATIENT)
Dept: GENERAL RADIOLOGY | Facility: HOSPITAL | Age: 60
End: 2025-05-14
Payer: COMMERCIAL

## 2025-05-14 ENCOUNTER — APPOINTMENT (OUTPATIENT)
Dept: CT IMAGING | Facility: HOSPITAL | Age: 60
End: 2025-05-14
Payer: COMMERCIAL

## 2025-05-14 ENCOUNTER — HOSPITAL ENCOUNTER (OUTPATIENT)
Facility: HOSPITAL | Age: 60
LOS: 1 days | Discharge: HOME | End: 2025-05-16
Attending: INTERNAL MEDICINE | Admitting: FAMILY MEDICINE
Payer: COMMERCIAL

## 2025-05-14 DIAGNOSIS — R06.00 DYSPNEA, UNSPECIFIED TYPE: ICD-10-CM

## 2025-05-14 DIAGNOSIS — R53.83 FATIGUE, UNSPECIFIED TYPE: ICD-10-CM

## 2025-05-14 DIAGNOSIS — R07.9 CHEST PAIN, UNSPECIFIED TYPE: Primary | ICD-10-CM

## 2025-05-14 LAB
ALBUMIN SERPL-MCNC: 4.6 G/DL (ref 3.5–5.2)
ALBUMIN/GLOB SERPL: 1.8 G/DL
ALP SERPL-CCNC: 95 U/L (ref 39–117)
ALT SERPL W P-5'-P-CCNC: 17 U/L (ref 1–41)
ANION GAP SERPL CALCULATED.3IONS-SCNC: 8.3 MMOL/L (ref 5–15)
AST SERPL-CCNC: 23 U/L (ref 1–40)
B PARAPERT DNA SPEC QL NAA+PROBE: NOT DETECTED
B PERT DNA SPEC QL NAA+PROBE: NOT DETECTED
BASOPHILS # BLD AUTO: 0.08 10*3/MM3 (ref 0–0.2)
BASOPHILS NFR BLD AUTO: 1 % (ref 0–1.5)
BILIRUB SERPL-MCNC: 0.4 MG/DL (ref 0–1.2)
BILIRUB UR QL STRIP: NEGATIVE
BUN SERPL-MCNC: 14 MG/DL (ref 8–23)
BUN/CREAT SERPL: 10.6 (ref 7–25)
C PNEUM DNA NPH QL NAA+NON-PROBE: NOT DETECTED
CALCIUM SPEC-SCNC: 9.5 MG/DL (ref 8.6–10.5)
CHLORIDE SERPL-SCNC: 104 MMOL/L (ref 98–107)
CLARITY UR: CLEAR
CO2 SERPL-SCNC: 27.7 MMOL/L (ref 22–29)
COLOR UR: YELLOW
CREAT SERPL-MCNC: 1.32 MG/DL (ref 0.76–1.27)
D DIMER PPP FEU-MCNC: 1.38 MCGFEU/ML (ref 0–0.6)
DEPRECATED RDW RBC AUTO: 43.9 FL (ref 37–54)
EGFRCR SERPLBLD CKD-EPI 2021: 61.7 ML/MIN/1.73
EOSINOPHIL # BLD AUTO: 0.31 10*3/MM3 (ref 0–0.4)
EOSINOPHIL NFR BLD AUTO: 3.8 % (ref 0.3–6.2)
ERYTHROCYTE [DISTWIDTH] IN BLOOD BY AUTOMATED COUNT: 12.8 % (ref 12.3–15.4)
FLUAV SUBTYP SPEC NAA+PROBE: NOT DETECTED
FLUBV RNA ISLT QL NAA+PROBE: NOT DETECTED
GEN 5 1HR TROPONIN T REFLEX: 7 NG/L
GLOBULIN UR ELPH-MCNC: 2.5 GM/DL
GLUCOSE SERPL-MCNC: 102 MG/DL (ref 65–99)
GLUCOSE UR STRIP-MCNC: NEGATIVE MG/DL
HADV DNA SPEC NAA+PROBE: NOT DETECTED
HCOV 229E RNA SPEC QL NAA+PROBE: NOT DETECTED
HCOV HKU1 RNA SPEC QL NAA+PROBE: NOT DETECTED
HCOV NL63 RNA SPEC QL NAA+PROBE: NOT DETECTED
HCOV OC43 RNA SPEC QL NAA+PROBE: NOT DETECTED
HCT VFR BLD AUTO: 40.1 % (ref 37.5–51)
HGB BLD-MCNC: 13.1 G/DL (ref 13–17.7)
HGB UR QL STRIP.AUTO: NEGATIVE
HMPV RNA NPH QL NAA+NON-PROBE: NOT DETECTED
HOLD SPECIMEN: NORMAL
HPIV1 RNA ISLT QL NAA+PROBE: NOT DETECTED
HPIV2 RNA SPEC QL NAA+PROBE: NOT DETECTED
HPIV3 RNA NPH QL NAA+PROBE: NOT DETECTED
HPIV4 P GENE NPH QL NAA+PROBE: NOT DETECTED
IMM GRANULOCYTES # BLD AUTO: 0.02 10*3/MM3 (ref 0–0.05)
IMM GRANULOCYTES NFR BLD AUTO: 0.2 % (ref 0–0.5)
KETONES UR QL STRIP: NEGATIVE
LEUKOCYTE ESTERASE UR QL STRIP.AUTO: NEGATIVE
LIPASE SERPL-CCNC: 32 U/L (ref 13–60)
LYMPHOCYTES # BLD AUTO: 1.56 10*3/MM3 (ref 0.7–3.1)
LYMPHOCYTES NFR BLD AUTO: 19.3 % (ref 19.6–45.3)
M PNEUMO IGG SER IA-ACNC: NOT DETECTED
MAGNESIUM SERPL-MCNC: 2.2 MG/DL (ref 1.6–2.4)
MCH RBC QN AUTO: 30.3 PG (ref 26.6–33)
MCHC RBC AUTO-ENTMCNC: 32.7 G/DL (ref 31.5–35.7)
MCV RBC AUTO: 92.6 FL (ref 79–97)
MONOCYTES # BLD AUTO: 0.54 10*3/MM3 (ref 0.1–0.9)
MONOCYTES NFR BLD AUTO: 6.7 % (ref 5–12)
NEUTROPHILS NFR BLD AUTO: 5.57 10*3/MM3 (ref 1.7–7)
NEUTROPHILS NFR BLD AUTO: 69 % (ref 42.7–76)
NITRITE UR QL STRIP: NEGATIVE
NRBC BLD AUTO-RTO: 0 /100 WBC (ref 0–0.2)
PH UR STRIP.AUTO: 7 [PH] (ref 5–8)
PLATELET # BLD AUTO: 256 10*3/MM3 (ref 140–450)
PMV BLD AUTO: 8.9 FL (ref 6–12)
POTASSIUM SERPL-SCNC: 4.7 MMOL/L (ref 3.5–5.2)
PROT SERPL-MCNC: 7.1 G/DL (ref 6–8.5)
PROT UR QL STRIP: NEGATIVE
RBC # BLD AUTO: 4.33 10*6/MM3 (ref 4.14–5.8)
RHINOVIRUS RNA SPEC NAA+PROBE: NOT DETECTED
RSV RNA NPH QL NAA+NON-PROBE: NOT DETECTED
SARS-COV-2 RNA RESP QL NAA+PROBE: NOT DETECTED
SODIUM SERPL-SCNC: 140 MMOL/L (ref 136–145)
SP GR UR STRIP: 1.01 (ref 1–1.03)
TROPONIN T NUMERIC DELTA: 1 NG/L
TROPONIN T SERPL HS-MCNC: 6 NG/L
TSH SERPL DL<=0.05 MIU/L-ACNC: 2.49 UIU/ML (ref 0.27–4.2)
UROBILINOGEN UR QL STRIP: NORMAL
WBC NRBC COR # BLD AUTO: 8.08 10*3/MM3 (ref 3.4–10.8)

## 2025-05-14 PROCEDURE — 93005 ELECTROCARDIOGRAM TRACING: CPT

## 2025-05-14 PROCEDURE — 71275 CT ANGIOGRAPHY CHEST: CPT

## 2025-05-14 PROCEDURE — 99285 EMERGENCY DEPT VISIT HI MDM: CPT

## 2025-05-14 PROCEDURE — 0202U NFCT DS 22 TRGT SARS-COV-2: CPT

## 2025-05-14 PROCEDURE — 80053 COMPREHEN METABOLIC PANEL: CPT

## 2025-05-14 PROCEDURE — 71045 X-RAY EXAM CHEST 1 VIEW: CPT

## 2025-05-14 PROCEDURE — 36415 COLL VENOUS BLD VENIPUNCTURE: CPT

## 2025-05-14 PROCEDURE — 93005 ELECTROCARDIOGRAM TRACING: CPT | Performed by: INTERNAL MEDICINE

## 2025-05-14 PROCEDURE — 81003 URINALYSIS AUTO W/O SCOPE: CPT

## 2025-05-14 PROCEDURE — 85379 FIBRIN DEGRADATION QUANT: CPT

## 2025-05-14 PROCEDURE — 83690 ASSAY OF LIPASE: CPT

## 2025-05-14 PROCEDURE — 83735 ASSAY OF MAGNESIUM: CPT

## 2025-05-14 PROCEDURE — 25510000001 IOPAMIDOL PER 1 ML

## 2025-05-14 PROCEDURE — 84484 ASSAY OF TROPONIN QUANT: CPT

## 2025-05-14 PROCEDURE — 85025 COMPLETE CBC W/AUTO DIFF WBC: CPT

## 2025-05-14 PROCEDURE — 84443 ASSAY THYROID STIM HORMONE: CPT

## 2025-05-14 RX ORDER — LISINOPRIL 10 MG/1
1 TABLET ORAL DAILY
COMMUNITY
Start: 2025-04-25 | End: 2025-05-20 | Stop reason: HOSPADM

## 2025-05-14 RX ORDER — IOPAMIDOL 755 MG/ML
100 INJECTION, SOLUTION INTRAVASCULAR
Status: COMPLETED | OUTPATIENT
Start: 2025-05-14 | End: 2025-05-14

## 2025-05-14 RX ORDER — ASPIRIN 81 MG/1
324 TABLET, CHEWABLE ORAL ONCE
Status: COMPLETED | OUTPATIENT
Start: 2025-05-14 | End: 2025-05-14

## 2025-05-14 RX ADMIN — ASPIRIN 81 MG CHEWABLE TABLET 324 MG: 81 TABLET CHEWABLE at 20:07

## 2025-05-14 RX ADMIN — IOPAMIDOL 100 ML: 755 INJECTION, SOLUTION INTRAVENOUS at 21:53

## 2025-05-15 ENCOUNTER — APPOINTMENT (OUTPATIENT)
Dept: NUCLEAR MEDICINE | Facility: HOSPITAL | Age: 60
End: 2025-05-15
Payer: COMMERCIAL

## 2025-05-15 ENCOUNTER — APPOINTMENT (OUTPATIENT)
Dept: CARDIOLOGY | Facility: HOSPITAL | Age: 60
End: 2025-05-15
Payer: COMMERCIAL

## 2025-05-15 PROBLEM — R07.9 CHEST PAIN: Status: ACTIVE | Noted: 2025-05-15

## 2025-05-15 PROBLEM — I10 ESSENTIAL HYPERTENSION: Status: ACTIVE | Noted: 2025-05-15

## 2025-05-15 PROBLEM — K21.9 LARYNGOPHARYNGEAL REFLUX (LPR): Status: ACTIVE | Noted: 2025-05-15

## 2025-05-15 PROBLEM — R06.02 SHORTNESS OF BREATH: Status: ACTIVE | Noted: 2025-05-15

## 2025-05-15 LAB
ALBUMIN SERPL-MCNC: 4.3 G/DL (ref 3.5–5.2)
ANION GAP SERPL CALCULATED.3IONS-SCNC: 10.7 MMOL/L (ref 5–15)
ANION GAP SERPL CALCULATED.3IONS-SCNC: 10.7 MMOL/L (ref 5–15)
AORTIC DIMENSIONLESS INDEX: 0.73 (DI)
AV MEAN PRESS GRAD SYS DOP V1V2: 2.9 MMHG
AV VMAX SYS DOP: 111.8 CM/SEC
BH CV ECHO LEFT VENTRICLE GLOBAL LONGITUDINAL STRAIN: -13.9 %
BH CV ECHO MEAS - AO MAX PG: 5 MMHG
BH CV ECHO MEAS - AO V2 VTI: 25.6 CM
BH CV ECHO MEAS - AVA(I,D): 2.7 CM2
BH CV ECHO MEAS - EDV(CUBED): 88.9 ML
BH CV ECHO MEAS - EDV(MOD-SP2): 105 ML
BH CV ECHO MEAS - EDV(MOD-SP4): 98.5 ML
BH CV ECHO MEAS - EF(MOD-SP2): 55.7 %
BH CV ECHO MEAS - EF(MOD-SP4): 52.2 %
BH CV ECHO MEAS - ESV(CUBED): 29.1 ML
BH CV ECHO MEAS - ESV(MOD-SP2): 46.5 ML
BH CV ECHO MEAS - ESV(MOD-SP4): 47.1 ML
BH CV ECHO MEAS - FS: 31.1 %
BH CV ECHO MEAS - IVS/LVPW: 0.91 CM
BH CV ECHO MEAS - IVSD: 0.91 CM
BH CV ECHO MEAS - LA DIMENSION: 3.7 CM
BH CV ECHO MEAS - LAT PEAK E' VEL: 13.2 CM/SEC
BH CV ECHO MEAS - LV DIASTOLIC VOL/BSA (35-75): 49.3 CM2
BH CV ECHO MEAS - LV MASS(C)D: 141.1 GRAMS
BH CV ECHO MEAS - LV MAX PG: 2.9 MMHG
BH CV ECHO MEAS - LV MEAN PG: 1.53 MMHG
BH CV ECHO MEAS - LV SYSTOLIC VOL/BSA (12-30): 23.6 CM2
BH CV ECHO MEAS - LV V1 MAX: 85.4 CM/SEC
BH CV ECHO MEAS - LV V1 VTI: 18.6 CM
BH CV ECHO MEAS - LVIDD: 4.5 CM
BH CV ECHO MEAS - LVIDS: 3.1 CM
BH CV ECHO MEAS - LVOT AREA: 3.7 CM2
BH CV ECHO MEAS - LVOT DIAM: 2.17 CM
BH CV ECHO MEAS - LVPWD: 0.99 CM
BH CV ECHO MEAS - MED PEAK E' VEL: 10.1 CM/SEC
BH CV ECHO MEAS - MR MAX PG: 79.6 MMHG
BH CV ECHO MEAS - MR MAX VEL: 446 CM/SEC
BH CV ECHO MEAS - MV A MAX VEL: 64 CM/SEC
BH CV ECHO MEAS - MV DEC SLOPE: 476 CM/SEC2
BH CV ECHO MEAS - MV DEC TIME: 0.17 SEC
BH CV ECHO MEAS - MV E MAX VEL: 50 CM/SEC
BH CV ECHO MEAS - MV E/A: 0.78
BH CV ECHO MEAS - MV MAX PG: 2.07 MMHG
BH CV ECHO MEAS - MV MEAN PG: 1.12 MMHG
BH CV ECHO MEAS - MV P1/2T: 36 MSEC
BH CV ECHO MEAS - MV V2 VTI: 14.2 CM
BH CV ECHO MEAS - MVA(P1/2T): 6.1 CM2
BH CV ECHO MEAS - MVA(VTI): 4.8 CM2
BH CV ECHO MEAS - PA ACC TIME: 0.11 SEC
BH CV ECHO MEAS - PA V2 MAX: 104.3 CM/SEC
BH CV ECHO MEAS - PULM A REVS DUR: 0.17 SEC
BH CV ECHO MEAS - PULM A REVS VEL: 18.7 CM/SEC
BH CV ECHO MEAS - PULM DIAS VEL: 47.1 CM/SEC
BH CV ECHO MEAS - PULM S/D: 0.98
BH CV ECHO MEAS - PULM SYS VEL: 46 CM/SEC
BH CV ECHO MEAS - RAP SYSTOLE: 3 MMHG
BH CV ECHO MEAS - RV MAX PG: 2.43 MMHG
BH CV ECHO MEAS - RV V1 MAX: 77.9 CM/SEC
BH CV ECHO MEAS - RV V1 VTI: 18.5 CM
BH CV ECHO MEAS - RVSP: 20.5 MMHG
BH CV ECHO MEAS - SV(LVOT): 68.9 ML
BH CV ECHO MEAS - SV(MOD-SP2): 58.5 ML
BH CV ECHO MEAS - SV(MOD-SP4): 51.4 ML
BH CV ECHO MEAS - SVI(LVOT): 34.5 ML/M2
BH CV ECHO MEAS - SVI(MOD-SP2): 29.3 ML/M2
BH CV ECHO MEAS - SVI(MOD-SP4): 25.7 ML/M2
BH CV ECHO MEAS - TAPSE (>1.6): 2.09 CM
BH CV ECHO MEAS - TR MAX PG: 17.5 MMHG
BH CV ECHO MEAS - TR MAX VEL: 209 CM/SEC
BH CV ECHO MEAS RV FREE WALL STRAIN: -22.2 %
BH CV ECHO MEASUREMENTS AVERAGE E/E' RATIO: 4.29
BH CV LOW VAS LEFT GREATER SAPH BK VESSEL: 1
BH CV LOW VAS LEFT LESSER SAPH VESSEL: 1
BH CV LOW VAS RIGHT LESSER SAPH VESSEL: 1
BH CV LOWER VASCULAR LEFT COMMON FEMORAL AUGMENT: NORMAL
BH CV LOWER VASCULAR LEFT COMMON FEMORAL COMPETENT: NORMAL
BH CV LOWER VASCULAR LEFT COMMON FEMORAL COMPRESS: NORMAL
BH CV LOWER VASCULAR LEFT COMMON FEMORAL PHASIC: NORMAL
BH CV LOWER VASCULAR LEFT COMMON FEMORAL SPONT: NORMAL
BH CV LOWER VASCULAR LEFT DISTAL FEMORAL COMPRESS: NORMAL
BH CV LOWER VASCULAR LEFT GASTRONEMIUS COMPRESS: NORMAL
BH CV LOWER VASCULAR LEFT GREATER SAPH AK COMPRESS: NORMAL
BH CV LOWER VASCULAR LEFT GREATER SAPH BK COMPRESS: NORMAL
BH CV LOWER VASCULAR LEFT GREATER SAPH BK THROMBUS: NORMAL
BH CV LOWER VASCULAR LEFT LESSER SAPH COMPRESS: NORMAL
BH CV LOWER VASCULAR LEFT LESSER SAPH THROMBUS: NORMAL
BH CV LOWER VASCULAR LEFT MID FEMORAL AUGMENT: NORMAL
BH CV LOWER VASCULAR LEFT MID FEMORAL COMPETENT: NORMAL
BH CV LOWER VASCULAR LEFT MID FEMORAL COMPRESS: NORMAL
BH CV LOWER VASCULAR LEFT MID FEMORAL PHASIC: NORMAL
BH CV LOWER VASCULAR LEFT MID FEMORAL SPONT: NORMAL
BH CV LOWER VASCULAR LEFT PERONEAL COMPRESS: NORMAL
BH CV LOWER VASCULAR LEFT POPLITEAL AUGMENT: NORMAL
BH CV LOWER VASCULAR LEFT POPLITEAL COMPETENT: NORMAL
BH CV LOWER VASCULAR LEFT POPLITEAL COMPRESS: NORMAL
BH CV LOWER VASCULAR LEFT POPLITEAL PHASIC: NORMAL
BH CV LOWER VASCULAR LEFT POPLITEAL SPONT: NORMAL
BH CV LOWER VASCULAR LEFT POSTERIOR TIBIAL COMPRESS: NORMAL
BH CV LOWER VASCULAR LEFT PROXIMAL FEMORAL COMPRESS: NORMAL
BH CV LOWER VASCULAR LEFT SAPHENOFEMORAL JUNCTION COMPRESS: NORMAL
BH CV LOWER VASCULAR RIGHT COMMON FEMORAL AUGMENT: NORMAL
BH CV LOWER VASCULAR RIGHT COMMON FEMORAL COMPETENT: NORMAL
BH CV LOWER VASCULAR RIGHT COMMON FEMORAL COMPRESS: NORMAL
BH CV LOWER VASCULAR RIGHT COMMON FEMORAL PHASIC: NORMAL
BH CV LOWER VASCULAR RIGHT COMMON FEMORAL SPONT: NORMAL
BH CV LOWER VASCULAR RIGHT DISTAL FEMORAL COMPRESS: NORMAL
BH CV LOWER VASCULAR RIGHT GASTRONEMIUS COMPRESS: NORMAL
BH CV LOWER VASCULAR RIGHT GREATER SAPH AK COMPRESS: NORMAL
BH CV LOWER VASCULAR RIGHT GREATER SAPH BK COMPRESS: NORMAL
BH CV LOWER VASCULAR RIGHT LESSER SAPH COMPRESS: NORMAL
BH CV LOWER VASCULAR RIGHT LESSER SAPH THROMBUS: NORMAL
BH CV LOWER VASCULAR RIGHT MID FEMORAL AUGMENT: NORMAL
BH CV LOWER VASCULAR RIGHT MID FEMORAL COMPETENT: NORMAL
BH CV LOWER VASCULAR RIGHT MID FEMORAL COMPRESS: NORMAL
BH CV LOWER VASCULAR RIGHT MID FEMORAL PHASIC: NORMAL
BH CV LOWER VASCULAR RIGHT MID FEMORAL SPONT: NORMAL
BH CV LOWER VASCULAR RIGHT PERONEAL COMPRESS: NORMAL
BH CV LOWER VASCULAR RIGHT POPLITEAL AUGMENT: NORMAL
BH CV LOWER VASCULAR RIGHT POPLITEAL COMPETENT: NORMAL
BH CV LOWER VASCULAR RIGHT POPLITEAL COMPRESS: NORMAL
BH CV LOWER VASCULAR RIGHT POPLITEAL PHASIC: NORMAL
BH CV LOWER VASCULAR RIGHT POPLITEAL SPONT: NORMAL
BH CV LOWER VASCULAR RIGHT POSTERIOR TIBIAL COMPRESS: NORMAL
BH CV LOWER VASCULAR RIGHT PROXIMAL FEMORAL COMPRESS: NORMAL
BH CV LOWER VASCULAR RIGHT SAPHENOFEMORAL JUNCTION COMPRESS: NORMAL
BH CV REST NUCLEAR ISOTOPE DOSE: 11 MCI
BH CV STRESS BP STAGE 1: NORMAL
BH CV STRESS BP STAGE 2: NORMAL
BH CV STRESS DURATION MIN STAGE 1: 3
BH CV STRESS DURATION MIN STAGE 2: 3
BH CV STRESS DURATION MIN STAGE 3: 3
BH CV STRESS DURATION SEC STAGE 1: 0
BH CV STRESS DURATION SEC STAGE 2: 0
BH CV STRESS DURATION SEC STAGE 3: 0
BH CV STRESS GRADE STAGE 1: 10
BH CV STRESS GRADE STAGE 2: 12
BH CV STRESS GRADE STAGE 3: 14
BH CV STRESS HR STAGE 1: 135
BH CV STRESS HR STAGE 2: 129
BH CV STRESS METS STAGE 1: 5
BH CV STRESS METS STAGE 2: 7.5
BH CV STRESS METS STAGE 3: 10
BH CV STRESS NUCLEAR ISOTOPE DOSE: 33 MCI
BH CV STRESS PROTOCOL 1: NORMAL
BH CV STRESS RECOVERY BP: NORMAL MMHG
BH CV STRESS RECOVERY HR: 81 BPM
BH CV STRESS SPEED STAGE 1: 1.7
BH CV STRESS SPEED STAGE 2: 2.5
BH CV STRESS SPEED STAGE 3: 3.4
BH CV STRESS STAGE 1: 1
BH CV STRESS STAGE 2: 2
BH CV STRESS STAGE 3: 3
BH CV XLRA - RV BASE: 3.5 CM
BH CV XLRA - RV MID: 3 CM
BH CV XLRA - TDI S': 10.6 CM/SEC
BUN SERPL-MCNC: 12 MG/DL (ref 8–23)
BUN SERPL-MCNC: 13 MG/DL (ref 8–23)
BUN/CREAT SERPL: 11.7 (ref 7–25)
BUN/CREAT SERPL: 9.7 (ref 7–25)
CALCIUM SPEC-SCNC: 8.9 MG/DL (ref 8.6–10.5)
CALCIUM SPEC-SCNC: 9 MG/DL (ref 8.6–10.5)
CHLORIDE SERPL-SCNC: 104 MMOL/L (ref 98–107)
CHLORIDE SERPL-SCNC: 107 MMOL/L (ref 98–107)
CHOLEST SERPL-MCNC: 222 MG/DL (ref 0–200)
CO2 SERPL-SCNC: 22.3 MMOL/L (ref 22–29)
CO2 SERPL-SCNC: 26.3 MMOL/L (ref 22–29)
CREAT SERPL-MCNC: 1.03 MG/DL (ref 0.76–1.27)
CREAT SERPL-MCNC: 1.34 MG/DL (ref 0.76–1.27)
DEPRECATED RDW RBC AUTO: 44.5 FL (ref 37–54)
EGFRCR SERPLBLD CKD-EPI 2021: 60.6 ML/MIN/1.73
EGFRCR SERPLBLD CKD-EPI 2021: 83.2 ML/MIN/1.73
ERYTHROCYTE [DISTWIDTH] IN BLOOD BY AUTOMATED COUNT: 13 % (ref 12.3–15.4)
GLUCOSE SERPL-MCNC: 105 MG/DL (ref 65–99)
GLUCOSE SERPL-MCNC: 94 MG/DL (ref 65–99)
HCT VFR BLD AUTO: 35.5 % (ref 37.5–51)
HDLC SERPL-MCNC: 56 MG/DL (ref 40–60)
HGB BLD-MCNC: 11.5 G/DL (ref 13–17.7)
IVRT: 98 MS
LDLC SERPL CALC-MCNC: 137 MG/DL (ref 0–100)
LDLC/HDLC SERPL: 2.39 {RATIO}
LEFT ATRIUM VOLUME INDEX: 18.3 ML/M2
LV EF 3D SEGMENTATION: 55 %
LV EF BIPLANE MOD: 55.3 %
MAXIMAL PREDICTED HEART RATE: 160 BPM
MCH RBC QN AUTO: 30.3 PG (ref 26.6–33)
MCHC RBC AUTO-ENTMCNC: 32.4 G/DL (ref 31.5–35.7)
MCV RBC AUTO: 93.7 FL (ref 79–97)
PERCENT MAX PREDICTED HR: 85 %
PHOSPHATE SERPL-MCNC: 2.9 MG/DL (ref 2.5–4.5)
PLATELET # BLD AUTO: 212 10*3/MM3 (ref 140–450)
PMV BLD AUTO: 9.2 FL (ref 6–12)
POTASSIUM SERPL-SCNC: 3.9 MMOL/L (ref 3.5–5.2)
POTASSIUM SERPL-SCNC: 4.3 MMOL/L (ref 3.5–5.2)
QT INTERVAL: 344 MS
QTC INTERVAL: 440 MS
RBC # BLD AUTO: 3.79 10*6/MM3 (ref 4.14–5.8)
SINUS: 3.1 CM
SODIUM SERPL-SCNC: 140 MMOL/L (ref 136–145)
SODIUM SERPL-SCNC: 141 MMOL/L (ref 136–145)
SPECT HRT GATED+EF W RNC IV: 63 %
STJ: 2.28 CM
STRESS BASELINE BP: NORMAL MMHG
STRESS BASELINE HR: 74 BPM
STRESS PERCENT HR: 100 %
STRESS POST ESTIMATED WORKLOAD: 7 METS
STRESS POST EXERCISE DUR MIN: 6 MIN
STRESS POST EXERCISE DUR SEC: 1 SEC
STRESS POST PEAK BP: NORMAL MMHG
STRESS POST PEAK HR: 136 BPM
STRESS TARGET HR: 136 BPM
TRIGL SERPL-MCNC: 161 MG/DL (ref 0–150)
TROPONIN T SERPL HS-MCNC: 8 NG/L
VLDLC SERPL-MCNC: 29 MG/DL (ref 5–40)
WBC NRBC COR # BLD AUTO: 6.84 10*3/MM3 (ref 3.4–10.8)

## 2025-05-15 PROCEDURE — 76376 3D RENDER W/INTRP POSTPROCES: CPT | Performed by: INTERNAL MEDICINE

## 2025-05-15 PROCEDURE — 84484 ASSAY OF TROPONIN QUANT: CPT

## 2025-05-15 PROCEDURE — 78452 HT MUSCLE IMAGE SPECT MULT: CPT

## 2025-05-15 PROCEDURE — 80048 BASIC METABOLIC PNL TOTAL CA: CPT

## 2025-05-15 PROCEDURE — 25010000002 METHYLPREDNISOLONE PER 125 MG: Performed by: FAMILY MEDICINE

## 2025-05-15 PROCEDURE — 96372 THER/PROPH/DIAG INJ SC/IM: CPT

## 2025-05-15 PROCEDURE — G0378 HOSPITAL OBSERVATION PER HR: HCPCS

## 2025-05-15 PROCEDURE — 93356 MYOCRD STRAIN IMG SPCKL TRCK: CPT | Performed by: INTERNAL MEDICINE

## 2025-05-15 PROCEDURE — 25010000002 ENOXAPARIN PER 10 MG

## 2025-05-15 PROCEDURE — 96374 THER/PROPH/DIAG INJ IV PUSH: CPT

## 2025-05-15 PROCEDURE — 80061 LIPID PANEL: CPT

## 2025-05-15 PROCEDURE — 93970 EXTREMITY STUDY: CPT

## 2025-05-15 PROCEDURE — 93970 EXTREMITY STUDY: CPT | Performed by: SURGERY

## 2025-05-15 PROCEDURE — 85027 COMPLETE CBC AUTOMATED: CPT

## 2025-05-15 PROCEDURE — 93017 CV STRESS TEST TRACING ONLY: CPT

## 2025-05-15 PROCEDURE — 93306 TTE W/DOPPLER COMPLETE: CPT

## 2025-05-15 PROCEDURE — A9502 TC99M TETROFOSMIN: HCPCS | Performed by: FAMILY MEDICINE

## 2025-05-15 PROCEDURE — 93018 CV STRESS TEST I&R ONLY: CPT | Performed by: INTERNAL MEDICINE

## 2025-05-15 PROCEDURE — 93306 TTE W/DOPPLER COMPLETE: CPT | Performed by: INTERNAL MEDICINE

## 2025-05-15 PROCEDURE — 80069 RENAL FUNCTION PANEL: CPT

## 2025-05-15 PROCEDURE — 34310000005 TECHNETIUM TETROFOSMIN KIT: Performed by: FAMILY MEDICINE

## 2025-05-15 PROCEDURE — 25810000003 SODIUM CHLORIDE 0.9 % SOLUTION

## 2025-05-15 PROCEDURE — 78452 HT MUSCLE IMAGE SPECT MULT: CPT | Performed by: INTERNAL MEDICINE

## 2025-05-15 PROCEDURE — 93356 MYOCRD STRAIN IMG SPCKL TRCK: CPT

## 2025-05-15 PROCEDURE — 99204 OFFICE O/P NEW MOD 45 MIN: CPT | Performed by: INTERNAL MEDICINE

## 2025-05-15 RX ORDER — BISACODYL 10 MG
10 SUPPOSITORY, RECTAL RECTAL DAILY PRN
Status: DISCONTINUED | OUTPATIENT
Start: 2025-05-15 | End: 2025-05-16 | Stop reason: HOSPADM

## 2025-05-15 RX ORDER — ENOXAPARIN SODIUM 100 MG/ML
40 INJECTION SUBCUTANEOUS DAILY
Status: DISCONTINUED | OUTPATIENT
Start: 2025-05-16 | End: 2025-05-15

## 2025-05-15 RX ORDER — ONDANSETRON 2 MG/ML
4 INJECTION INTRAMUSCULAR; INTRAVENOUS EVERY 6 HOURS PRN
Status: DISCONTINUED | OUTPATIENT
Start: 2025-05-15 | End: 2025-05-16 | Stop reason: HOSPADM

## 2025-05-15 RX ORDER — PANTOPRAZOLE SODIUM 40 MG/1
40 TABLET, DELAYED RELEASE ORAL
Status: DISCONTINUED | OUTPATIENT
Start: 2025-05-15 | End: 2025-05-15

## 2025-05-15 RX ORDER — AMOXICILLIN 250 MG
2 CAPSULE ORAL 2 TIMES DAILY PRN
Status: DISCONTINUED | OUTPATIENT
Start: 2025-05-15 | End: 2025-05-16 | Stop reason: HOSPADM

## 2025-05-15 RX ORDER — SODIUM CHLORIDE 9 MG/ML
75 INJECTION, SOLUTION INTRAVENOUS CONTINUOUS
Status: DISCONTINUED | OUTPATIENT
Start: 2025-05-16 | End: 2025-05-16 | Stop reason: HOSPADM

## 2025-05-15 RX ORDER — ACETAMINOPHEN 325 MG/1
650 TABLET ORAL EVERY 4 HOURS PRN
Status: DISCONTINUED | OUTPATIENT
Start: 2025-05-15 | End: 2025-05-16 | Stop reason: HOSPADM

## 2025-05-15 RX ORDER — LORAZEPAM 0.5 MG/1
0.5 TABLET ORAL NIGHTLY PRN
Status: DISCONTINUED | OUTPATIENT
Start: 2025-05-15 | End: 2025-05-16 | Stop reason: HOSPADM

## 2025-05-15 RX ORDER — IPRATROPIUM BROMIDE AND ALBUTEROL SULFATE 2.5; .5 MG/3ML; MG/3ML
3 SOLUTION RESPIRATORY (INHALATION) EVERY 6 HOURS PRN
Status: DISCONTINUED | OUTPATIENT
Start: 2025-05-15 | End: 2025-05-16 | Stop reason: HOSPADM

## 2025-05-15 RX ORDER — SODIUM CHLORIDE 0.9 % (FLUSH) 0.9 %
10 SYRINGE (ML) INJECTION EVERY 12 HOURS SCHEDULED
Status: DISCONTINUED | OUTPATIENT
Start: 2025-05-15 | End: 2025-05-16 | Stop reason: HOSPADM

## 2025-05-15 RX ORDER — NITROGLYCERIN 0.4 MG/1
0.4 TABLET SUBLINGUAL
Status: DISCONTINUED | OUTPATIENT
Start: 2025-05-15 | End: 2025-05-16 | Stop reason: HOSPADM

## 2025-05-15 RX ORDER — BISACODYL 5 MG/1
5 TABLET, DELAYED RELEASE ORAL DAILY PRN
Status: DISCONTINUED | OUTPATIENT
Start: 2025-05-15 | End: 2025-05-16 | Stop reason: HOSPADM

## 2025-05-15 RX ORDER — POLYETHYLENE GLYCOL 3350 17 G/17G
17 POWDER, FOR SOLUTION ORAL DAILY PRN
Status: DISCONTINUED | OUTPATIENT
Start: 2025-05-15 | End: 2025-05-16 | Stop reason: HOSPADM

## 2025-05-15 RX ORDER — LISINOPRIL 5 MG/1
10 TABLET ORAL DAILY
Status: DISCONTINUED | OUTPATIENT
Start: 2025-05-15 | End: 2025-05-16 | Stop reason: HOSPADM

## 2025-05-15 RX ORDER — ONDANSETRON 4 MG/1
4 TABLET, ORALLY DISINTEGRATING ORAL EVERY 6 HOURS PRN
Status: DISCONTINUED | OUTPATIENT
Start: 2025-05-15 | End: 2025-05-16 | Stop reason: HOSPADM

## 2025-05-15 RX ORDER — METHYLPREDNISOLONE SODIUM SUCCINATE 125 MG/2ML
60 INJECTION, POWDER, LYOPHILIZED, FOR SOLUTION INTRAMUSCULAR; INTRAVENOUS ONCE
Status: COMPLETED | OUTPATIENT
Start: 2025-05-15 | End: 2025-05-15

## 2025-05-15 RX ORDER — SODIUM CHLORIDE 9 MG/ML
40 INJECTION, SOLUTION INTRAVENOUS AS NEEDED
Status: DISCONTINUED | OUTPATIENT
Start: 2025-05-15 | End: 2025-05-16 | Stop reason: HOSPADM

## 2025-05-15 RX ORDER — ALUMINA, MAGNESIA, AND SIMETHICONE 2400; 2400; 240 MG/30ML; MG/30ML; MG/30ML
15 SUSPENSION ORAL EVERY 6 HOURS PRN
Status: DISCONTINUED | OUTPATIENT
Start: 2025-05-15 | End: 2025-05-16 | Stop reason: HOSPADM

## 2025-05-15 RX ORDER — ENOXAPARIN SODIUM 100 MG/ML
1 INJECTION SUBCUTANEOUS ONCE
Status: COMPLETED | OUTPATIENT
Start: 2025-05-15 | End: 2025-05-15

## 2025-05-15 RX ORDER — SODIUM CHLORIDE 0.9 % (FLUSH) 0.9 %
10 SYRINGE (ML) INJECTION AS NEEDED
Status: DISCONTINUED | OUTPATIENT
Start: 2025-05-15 | End: 2025-05-16 | Stop reason: HOSPADM

## 2025-05-15 RX ORDER — PANTOPRAZOLE SODIUM 40 MG/1
40 TABLET, DELAYED RELEASE ORAL
Status: DISCONTINUED | OUTPATIENT
Start: 2025-05-16 | End: 2025-05-16 | Stop reason: HOSPADM

## 2025-05-15 RX ORDER — HYDRALAZINE HYDROCHLORIDE 20 MG/ML
10 INJECTION INTRAMUSCULAR; INTRAVENOUS EVERY 6 HOURS PRN
Status: DISCONTINUED | OUTPATIENT
Start: 2025-05-15 | End: 2025-05-16 | Stop reason: HOSPADM

## 2025-05-15 RX ADMIN — NITROGLYCERIN 0.4 MG: 0.4 TABLET SUBLINGUAL at 15:17

## 2025-05-15 RX ADMIN — Medication 10 ML: at 10:58

## 2025-05-15 RX ADMIN — ACETAMINOPHEN 650 MG: 325 TABLET ORAL at 20:10

## 2025-05-15 RX ADMIN — TETROFOSMIN 1 DOSE: 1.38 INJECTION, POWDER, LYOPHILIZED, FOR SOLUTION INTRAVENOUS at 06:57

## 2025-05-15 RX ADMIN — METHYLPREDNISOLONE SODIUM SUCCINATE 60 MG: 125 INJECTION, POWDER, FOR SOLUTION INTRAMUSCULAR; INTRAVENOUS at 13:12

## 2025-05-15 RX ADMIN — Medication 10 ML: at 00:41

## 2025-05-15 RX ADMIN — LORAZEPAM 0.5 MG: 0.5 TABLET ORAL at 21:26

## 2025-05-15 RX ADMIN — ENOXAPARIN SODIUM 80 MG: 100 INJECTION SUBCUTANEOUS at 04:10

## 2025-05-15 RX ADMIN — SODIUM CHLORIDE 500 ML: 0.9 INJECTION, SOLUTION INTRAVENOUS at 00:41

## 2025-05-15 RX ADMIN — TETROFOSMIN 1 DOSE: 1.38 INJECTION, POWDER, LYOPHILIZED, FOR SOLUTION INTRAVENOUS at 08:59

## 2025-05-15 RX ADMIN — Medication 10 ML: at 20:02

## 2025-05-15 RX ADMIN — ALUMINUM HYDROXIDE, MAGNESIUM HYDROXIDE, AND DIMETHICONE 15 ML: 400; 400; 40 SUSPENSION ORAL at 13:12

## 2025-05-15 NOTE — PROGRESS NOTES
LOS: 1 day   Patient Care Team:  Aristeo Rodriguez MD as PCP - General (Family Medicine)  Aristeo Rodriguez MD as PCP - Family Medicine  Aristeo Rodriguez MD    Subjective     Interval History:     Patient Complaints: pt continues to have chest pressure but the sensations in the left arm are resolved    History taken from: patient    Review of Systems   HENT: Negative.     Respiratory: Negative.     Cardiovascular:  Positive for chest pain.   Gastrointestinal:  Positive for abdominal pain (reflux). Negative for nausea.   Musculoskeletal:  Positive for arthralgias.   Psychiatric/Behavioral:  The patient is nervous/anxious.            Objective     Vital Signs  Temp:  [97.7 °F (36.5 °C)-98.2 °F (36.8 °C)] 97.7 °F (36.5 °C)  Heart Rate:  [] 69  Resp:  [16-24] 22  BP: (112-165)/(66-96) 142/83    Physical Exam:     General Appearance:    Alert, cooperative, in no acute distress   Head:    Normocephalic, without obvious abnormality, atraumatic   Eyes:            Lids and lashes normal, conjunctivae and sclerae normal, no   icterus, no pallor, corneas clear, PERRLA   Ears:    Ears appear intact with no abnormalities noted   Throat:   No oral lesions, no thrush, oral mucosa moist   Neck:   No adenopathy, supple, trachea midline, no thyromegaly, no   carotid bruit, no JVD   Lungs:     Clear to auscultation,respirations regular, even and                  unlabored    Heart:    Regular rhythm and normal rate, normal S1 and S2, no            murmur, no gallop, no rub, no click   Chest Wall:    No abnormalities observed   Abdomen:     Normal bowel sounds, no masses, no organomegaly, soft        non-tender, non-distended, no guarding, no rebound                tenderness   Extremities:   Moves all extremities well, no edema, no cyanosis, no             redness   Pulses:   Pulses palpable and equal bilaterally   Skin:   No bleeding, bruising or rash   Lymph nodes:   No palpable adenopathy   Neurologic:   Cranial nerves 2 - 12  grossly intact, sensation intact, DTR       present and equal bilaterally        Results Review:    Lab Results (last 24 hours)       Procedure Component Value Units Date/Time    Basic Metabolic Panel [[id number]]  (Abnormal) Collected: 05/15/25 0041    Specimen: Blood from Arm, Right Updated: 05/15/25 0123     Glucose 105 mg/dL      BUN 13 mg/dL      Creatinine 1.34 mg/dL      Sodium 141 mmol/L      Potassium 4.3 mmol/L      Chloride 104 mmol/L      CO2 26.3 mmol/L      Calcium 8.9 mg/dL      BUN/Creatinine Ratio 9.7     Anion Gap 10.7 mmol/L      eGFR 60.6 mL/min/1.73     Narrative:      GFR Categories in Chronic Kidney Disease (CKD)              GFR Category          GFR (mL/min/1.73)    Interpretation  G1                    90 or greater        Normal or high (1)  G2                    60-89                Mild decrease (1)  G3a                   45-59                Mild to moderate decrease  G3b                   30-44                Moderate to severe decrease  G4                    15-29                Severe decrease  G5                    14 or less           Kidney failure    (1)In the absence of evidence of kidney disease, neither GFR category G1 or G2 fulfill the criteria for CKD.    eGFR calculation 2021 CKD-EPI creatinine equation, which does not include race as a factor    High Sensitivity Troponin T [[id number]]  (Normal) Collected: 05/15/25 0041    Specimen: Blood from Arm, Right Updated: 05/15/25 0123     HS Troponin T 8 ng/L     Narrative:      High Sensitive Troponin T Reference Range:  <14.0 ng/L- Negative Female for AMI  <22.0 ng/L- Negative Male for AMI  >=14 - Abnormal Female indicating possible myocardial injury.  >=22 - Abnormal Male indicating possible myocardial injury.   Clinicians would have to utilize clinical acumen, EKG, Troponin, and serial changes to determine if it is an Acute Myocardial Infarction or myocardial injury due to an underlying chronic condition.         Lipid Panel  [[id number]]  (Abnormal) Collected: 05/15/25 0041    Specimen: Blood from Arm, Right Updated: 05/15/25 0123     Total Cholesterol 222 mg/dL      Triglycerides 161 mg/dL      HDL Cholesterol 56 mg/dL      LDL Cholesterol  137 mg/dL      VLDL Cholesterol 29 mg/dL      LDL/HDL Ratio 2.39    Narrative:      Cholesterol Reference Ranges  (U.S. Department of Health and Human Services ATP III Classifications)    Desirable          <200 mg/dL  Borderline High    200-239 mg/dL  High Risk          >240 mg/dL      Triglyceride Reference Ranges  (U.S. Department of Health and Human Services ATP III Classifications)    Normal           <150 mg/dL  Borderline High  150-199 mg/dL  High             200-499 mg/dL  Very High        >500 mg/dL    HDL Reference Ranges  (U.S. Department of Health and Human Services ATP III Classifications)    Low     <40 mg/dl (major risk factor for CHD)  High    >60 mg/dl ('negative' risk factor for CHD)        LDL Reference Ranges  (U.S. Department of Health and Human Services ATP III Classifications)    Optimal          <100 mg/dL  Near Optimal     100-129 mg/dL  Borderline High  130-159 mg/dL  High             160-189 mg/dL  Very High        >189 mg/dL    LDL is calculated using the NIH LDL-C calculation.      CBC (No Diff) [[id number]]  (Abnormal) Collected: 05/15/25 0041    Specimen: Blood from Arm, Right Updated: 05/15/25 0103     WBC 6.84 10*3/mm3      RBC 3.79 10*6/mm3      Hemoglobin 11.5 g/dL      Hematocrit 35.5 %      MCV 93.7 fL      MCH 30.3 pg      MCHC 32.4 g/dL      RDW 13.0 %      RDW-SD 44.5 fl      MPV 9.2 fL      Platelets 212 10*3/mm3     High Sensitivity Troponin T 1Hr [[id number]]  (Normal) Collected: 05/14/25 2120    Specimen: Blood Updated: 05/14/25 2142     HS Troponin T 7 ng/L      Troponin T Numeric Delta 1 ng/L     Narrative:      High Sensitive Troponin T Reference Range:  <14.0 ng/L- Negative Female for AMI  <22.0 ng/L- Negative Male for AMI  >=14 - Abnormal Female  indicating possible myocardial injury.  >=22 - Abnormal Male indicating possible myocardial injury.   Clinicians would have to utilize clinical acumen, EKG, Troponin, and serial changes to determine if it is an Acute Myocardial Infarction or myocardial injury due to an underlying chronic condition.         Respiratory Panel PCR w/COVID-19(SARS-CoV-2) MANN/MIKAYLA/SELVIN/PAD/COR/VICKY In-House, NP Swab in UTM/VTM, 2 HR TAT - Swab, Nasopharynx [[id number]]  (Normal) Collected: 05/14/25 2015    Specimen: Swab from Nasopharynx Updated: 05/14/25 2111     ADENOVIRUS, PCR Not Detected     Coronavirus 229E Not Detected     Coronavirus HKU1 Not Detected     Coronavirus NL63 Not Detected     Coronavirus OC43 Not Detected     COVID19 Not Detected     Human Metapneumovirus Not Detected     Human Rhinovirus/Enterovirus Not Detected     Influenza A PCR Not Detected     Influenza B PCR Not Detected     Parainfluenza Virus 1 Not Detected     Parainfluenza Virus 2 Not Detected     Parainfluenza Virus 3 Not Detected     Parainfluenza Virus 4 Not Detected     RSV, PCR Not Detected     Bordetella pertussis pcr Not Detected     Bordetella parapertussis PCR Not Detected     Chlamydophila pneumoniae PCR Not Detected     Mycoplasma pneumo by PCR Not Detected    Narrative:      In the setting of a positive respiratory panel with a viral infection PLUS a negative procalcitonin without other underlying concern for bacterial infection, consider observing off antibiotics or discontinuation of antibiotics and continue supportive care. If the respiratory panel is positive for atypical bacterial infection (Bordetella pertussis, Chlamydophila pneumoniae, or Mycoplasma pneumoniae), consider antibiotic de-escalation to target atypical bacterial infection.    Comprehensive Metabolic Panel [[id number]]  (Abnormal) Collected: 05/14/25 2015    Specimen: Blood Updated: 05/14/25 2053     Glucose 102 mg/dL      BUN 14 mg/dL      Creatinine 1.32 mg/dL      Sodium 140  mmol/L      Potassium 4.7 mmol/L      Chloride 104 mmol/L      CO2 27.7 mmol/L      Calcium 9.5 mg/dL      Total Protein 7.1 g/dL      Albumin 4.6 g/dL      ALT (SGPT) 17 U/L      AST (SGOT) 23 U/L      Alkaline Phosphatase 95 U/L      Total Bilirubin 0.4 mg/dL      Globulin 2.5 gm/dL      A/G Ratio 1.8 g/dL      BUN/Creatinine Ratio 10.6     Anion Gap 8.3 mmol/L      eGFR 61.7 mL/min/1.73     Narrative:      GFR Categories in Chronic Kidney Disease (CKD)              GFR Category          GFR (mL/min/1.73)    Interpretation  G1                    90 or greater        Normal or high (1)  G2                    60-89                Mild decrease (1)  G3a                   45-59                Mild to moderate decrease  G3b                   30-44                Moderate to severe decrease  G4                    15-29                Severe decrease  G5                    14 or less           Kidney failure    (1)In the absence of evidence of kidney disease, neither GFR category G1 or G2 fulfill the criteria for CKD.    eGFR calculation 2021 CKD-EPI creatinine equation, which does not include race as a factor    Lipase [[id number]]  (Normal) Collected: 05/14/25 2015    Specimen: Blood Updated: 05/14/25 2053     Lipase 32 U/L     High Sensitivity Troponin T [[id number]]  (Normal) Collected: 05/14/25 2015    Specimen: Blood Updated: 05/14/25 2053     HS Troponin T 6 ng/L     Narrative:      High Sensitive Troponin T Reference Range:  <14.0 ng/L- Negative Female for AMI  <22.0 ng/L- Negative Male for AMI  >=14 - Abnormal Female indicating possible myocardial injury.  >=22 - Abnormal Male indicating possible myocardial injury.   Clinicians would have to utilize clinical acumen, EKG, Troponin, and serial changes to determine if it is an Acute Myocardial Infarction or myocardial injury due to an underlying chronic condition.         Magnesium [[id number]]  (Normal) Collected: 05/14/25 2015    Specimen: Blood Updated: 05/14/25  "2053     Magnesium 2.2 mg/dL     TSH Rfx On Abnormal To Free T4 [[id number]]  (Normal) Collected: 05/14/25 2015    Specimen: Blood Updated: 05/14/25 2053     TSH 2.490 uIU/mL     D-dimer, Quantitative [[id number]]  (Abnormal) Collected: 05/14/25 2015    Specimen: Blood Updated: 05/14/25 2034     D-Dimer, Quantitative 1.38 MCGFEU/mL     Narrative:      According to the assay 's published package insert, a normal (<0.50 MCGFEU/mL) D-dimer result in conjunction with a non-high clinical probability assessment, excludes deep vein thrombosis (DVT) and pulmonary embolism (PE) with high sensitivity.    D-dimer values increase with age and this can make VTE exclusion of an older population difficult. To address this, the [employer], based on best available evidence and recent guidelines, recommends that clinicians use age-adjusted D-dimer thresholds in patients greater than 50 years of age with: a) a low probability of PE who do not meet all Pulmonary Embolism Rule Out Criteria, or b) in those with intermediate probability of PE.   The formula for an age-adjusted D-dimer cut-off is \"age/100\".  For example, a 60 year old patient would have an age-adjusted cut-off of 0.60 MCGFEU/mL and an 80 year old 0.80 MCGFEU/mL.    Extra Tubes [[id number]] Collected: 05/14/25 2015    Specimen: Blood, Venous Line Updated: 05/14/25 2030    Narrative:      The following orders were created for panel order Extra Tubes.  Procedure                               Abnormality         Status                     ---------                               -----------         ------                     Gold Top - SST[[id number]]                                   Final result                 Please view results for these tests on the individual orders.    Gold Top - SST [[id number]] Collected: 05/14/25 2015    Specimen: Blood Updated: 05/14/25 2030     Extra Tube Hold for add-ons.     Comment: Auto resulted.       Urinalysis With " Culture If Indicated - Urine, Clean Catch [[id number]]  (Normal) Collected: 05/14/25 2015    Specimen: Urine, Clean Catch Updated: 05/14/25 2024     Color, UA Yellow     Appearance, UA Clear     pH, UA 7.0     Specific Gravity, UA 1.011     Glucose, UA Negative     Ketones, UA Negative     Bilirubin, UA Negative     Blood, UA Negative     Protein, UA Negative     Leuk Esterase, UA Negative     Nitrite, UA Negative     Urobilinogen, UA 0.2 E.U./dL    Narrative:      In absence of clinical symptoms, the presence of pyuria, bacteria, and/or nitrites on the urinalysis result does not correlate with infection.  Urine microscopic not indicated.    CBC & Differential [[id number]]  (Abnormal) Collected: 05/14/25 2015    Specimen: Blood Updated: 05/14/25 2021    Narrative:      The following orders were created for panel order CBC & Differential.  Procedure                               Abnormality         Status                     ---------                               -----------         ------                     CBC Auto Differential[[id number]]        Abnormal            Final result                 Please view results for these tests on the individual orders.    CBC Auto Differential [[id number]]  (Abnormal) Collected: 05/14/25 2015    Specimen: Blood Updated: 05/14/25 2021     WBC 8.08 10*3/mm3      RBC 4.33 10*6/mm3      Hemoglobin 13.1 g/dL      Hematocrit 40.1 %      MCV 92.6 fL      MCH 30.3 pg      MCHC 32.7 g/dL      RDW 12.8 %      RDW-SD 43.9 fl      MPV 8.9 fL      Platelets 256 10*3/mm3      Neutrophil % 69.0 %      Lymphocyte % 19.3 %      Monocyte % 6.7 %      Eosinophil % 3.8 %      Basophil % 1.0 %      Immature Grans % 0.2 %      Neutrophils, Absolute 5.57 10*3/mm3      Lymphocytes, Absolute 1.56 10*3/mm3      Monocytes, Absolute 0.54 10*3/mm3      Eosinophils, Absolute 0.31 10*3/mm3      Basophils, Absolute 0.08 10*3/mm3      Immature Grans, Absolute 0.02 10*3/mm3      nRBC 0.0 /100 WBC               Imaging Results (Last 24 Hours)       Procedure Component Value Units Date/Time    CT Angiogram Chest Pulmonary Embolism [[id number]] Collected: 05/14/25 2216     Updated: 05/14/25 2222    Narrative:      CT ANGIOGRAM CHEST PULMONARY EMBOLISM    Date of Exam: 5/14/2025 9:33 PM EDT    Indication: elevated dimer, cp, dyspnea.    Comparison: Chest radiograph 5/14/2025    Technique: Axial CT images were obtained of the chest after the uneventful intravenous administration of iodinated contrast utilizing pulmonary embolism protocol.  In addition, a 3-D volume rendered image was created for interpretation.  Sagittal and   coronal reconstructions were performed.  Automated exposure control and iterative reconstruction methods were used.    Findings:  Soft tissues of the visualized lower neck are unremarkable. Heart size normal. Mild coronary artery calcification. Negative for pericardial effusion. No pleural effusion. The pulmonary arteries are well opacified with contrast. Negative for pulmonary   embolus. No mediastinal, hilar, or axillary adenopathy.    Trachea and mainstem bronchi are patent. No pneumothorax. No suspicious pulmonary nodule or mass. Calcified granuloma in the left lower lobe adjacent to the pulmonary fissure.    Upper abdomen demonstrates normal visualized portions of the liver, spleen, adrenal glands, pancreas, and gallbladder. No free fluid in the upper abdomen. The visualized portions of the celiac artery and superior mesenteric artery are patent. No   aggressive osseous lesion or acute fracture.      Impression:      Impression:  1. Negative for pulmonary embolus.  2. No acute intrathoracic process.  3. Chronic incidental findings above.            Electronically Signed: Sreekanth Huang MD    5/14/2025 10:20 PM EDT    Workstation ID: [id number]    XR Chest 1 View [[id number]] Collected: 05/14/25 2111     Updated: 05/14/25 2113    Narrative:      XR CHEST 1 VW    Date of Exam: 5/14/2025 8:12 PM  EDT    Indication: Chest pain, dyspnea    Comparison: 12/24/2023    Findings:  The cardiomediastinal silhouette is within normal limits. Lungs are clear. No focal consolidation, pneumothorax, or significant pleural effusion. Osseous structures grossly intact. Calcified left midlung granuloma.      Impression:      Impression:  No acute process.          Electronically Signed: Sreekanth Huang MD    5/14/2025 9:11 PM EDT    Workstation ID: [id number]                 I reviewed the patient's new clinical results.    Medication Review:   Scheduled Meds:[Held by provider] lisinopril, 10 mg, Oral, Daily  pantoprazole, 40 mg, Oral, Q AM  sodium chloride, 10 mL, Intravenous, Q12H      Continuous Infusions:   PRN Meds:.  acetaminophen    senna-docusate sodium **AND** polyethylene glycol **AND** bisacodyl **AND** bisacodyl    hydrALAZINE    ipratropium-albuterol    nitroglycerin    ondansetron ODT **OR** ondansetron    sodium chloride    sodium chloride     Assessment & Plan       Chest pain    Laryngopharyngeal reflux (LPR)    Essential hypertension    Shortness of breath    Chest pain              -Stress test and echo are neg              -troponin neg x 3   - cardiology consult pending   - PPI BID   - solumedrol IV x1 dose   - GI cocktail     Shortness of breath              -D. Dimer +.  CT negative for pe.              -BLE duplex -               -1x dose of lovenox for VTE prophylaxis.              -prn duoneb     LPR              -PPI BID     HTN              -lisinopril     - prn hydralazine for SBP greater than 160    Elevated creatine               -500cc bolus was given but am labs were drawn prior to bolus.    - resolved this am    Anxiety   - ativan 0.5mg QHS prn (home med)    CODE STATUS:  Code status (Patient has no pulse and is not breathing):full  Medical Interventions (Patient has pulse or is breathing):full support  Level of Support Discussed with :patient     Admission status:  I believe this patient meets  observation status  Expected length of stay:  2 midnights or less  I discussed the patient's findings and my recommendations with the patient.    Plan for disposition:home    Dipti Kang MD  05/15/25  09:18 EDT

## 2025-05-15 NOTE — CASE MANAGEMENT/SOCIAL WORK
Discharge Planning Assessment   Brandon     Patient Name: Flip Vang  MRN: [id number]  Today's Date: 5/15/2025    Admit Date: 5/14/2025    Plan: Routine home.   Discharge Needs Assessment       Row Name 05/15/25 1209       Living Environment    People in Home child(kelsie), adult    Name(s) of People in Home Son-Lee    Current Living Arrangements home    Potentially Unsafe Housing Conditions none    In the past 12 months has the electric, gas, oil, or water company threatened to shut off services in your home? No    Primary Care Provided by self    Provides Primary Care For no one    Family Caregiver if Needed child(kelsie), adult    Family Caregiver Names Daughter-Meliza, Son-Lee    Quality of Family Relationships helpful;involved;supportive    Able to Return to Prior Arrangements yes       Resource/Environmental Concerns    Resource/Environmental Concerns none    Transportation Concerns none       Transportation Needs    In the past 12 months, has lack of transportation kept you from medical appointments or from getting medications? no    In the past 12 months, has lack of transportation kept you from meetings, work, or from getting things needed for daily living? No       Food Insecurity    Within the past 12 months, you worried that your food would run out before you got the money to buy more. Never true    Within the past 12 months, the food you bought just didn't last and you didn't have money to get more. Never true       Transition Planning    Patient/Family Anticipates Transition to home    Patient/Family Anticipated Services at Transition none    Transportation Anticipated car, drives self;family or friend will provide       Discharge Needs Assessment    Readmission Within the Last 30 Days no previous admission in last 30 days    Equipment Currently Used at Home bp cuff    Concerns to be Addressed denies needs/concerns at this time;no discharge needs identified    Do you want help finding or keeping  work or a job? Patient declined    Do you want help with school or training? For example, starting or completing job training or getting a high school diploma, GED or equivalent No    Anticipated Changes Related to Illness none    Equipment Needed After Discharge none    Provided Post Acute Provider List? N/A    Provided Post Acute Provider Quality & Resource List? N/A                   Discharge Plan       Row Name 05/15/25 1210       Plan    Plan Routine home.    Patient/Family in Agreement with Plan yes    Plan Comments CM met with patient and daughter Meliza at bedside. Pt lives at home alone, drives, and is independent with ADL's. PCP and pharmacy confirmed ([facility] in [place]) but agreeable to use [facility]. Only DME used at home is BP cuff. Pt denies current HH/therapy services. No financial concerns r/t medication, food, utilities. No transportation issues getting to appts- daughter Meliza will transport at time of dc.              Demographic Summary       Row Name 05/15/25 120       General Information    Admission Type observation    Arrived From emergency department    Referral Source admission list    Reason for Consult care coordination/care conference;discharge planning    Preferred Language English       Contact Information    Permission Granted to Share Info With                    Functional Status       Row Name 05/15/25 1209       Functional Status    Usual Activity Tolerance good    Current Activity Tolerance good       Functional Status, IADL    Medications independent    Meal Preparation independent    Housekeeping independent    Laundry independent    Shopping independent    If for any reason you need help with day-to-day activities such as bathing, preparing meals, shopping, managing finances, etc., do you get the help you need? I don't need any help       Employment/    Employment Status employed part-time             Megan Naegele, RN     Office  Phone: [phone]  Office Cell: [phone]

## 2025-05-15 NOTE — PLAN OF CARE
Problem: Adult Inpatient Plan of Care  Goal: Plan of Care Review  Outcome: Progressing  Goal: Patient-Specific Goal (Individualized)  Outcome: Progressing  Goal: Absence of Hospital-Acquired Illness or Injury  Outcome: Progressing  Intervention: Identify and Manage Fall Risk  Recent Flowsheet Documentation  Taken 5/15/2025 1459 by Evelyn Perez RN  Safety Promotion/Fall Prevention:   clutter free environment maintained   room organization consistent   safety round/check completed  Taken 5/15/2025 1450 by Evelyn Perez RN  Safety Promotion/Fall Prevention:   clutter free environment maintained   room organization consistent   safety round/check completed  Taken 5/15/2025 1245 by Evelyn Perez RN  Safety Promotion/Fall Prevention:   clutter free environment maintained   room organization consistent   safety round/check completed  Taken 5/15/2025 0800 by Evelyn Perez RN  Safety Promotion/Fall Prevention: patient off unit  Intervention: Prevent Skin Injury  Recent Flowsheet Documentation  Taken 5/15/2025 1058 by Evelyn Perez RN  Body Position: supine  Skin Protection: protective footwear used  Intervention: Prevent Infection  Recent Flowsheet Documentation  Taken 5/15/2025 1459 by Evelyn Perez RN  Infection Prevention: rest/sleep promoted  Taken 5/15/2025 1450 by Evelyn Perez RN  Infection Prevention: rest/sleep promoted  Taken 5/15/2025 1245 by Evelyn Perez RN  Infection Prevention: rest/sleep promoted  Taken 5/15/2025 1058 by Evelyn Perez RN  Infection Prevention: rest/sleep promoted  Goal: Optimal Comfort and Wellbeing  Outcome: Progressing  Intervention: Provide Person-Centered Care  Recent Flowsheet Documentation  Taken 5/15/2025 1058 by Evelyn Perez RN  Trust Relationship/Rapport:   care explained   questions answered   questions encouraged  Goal: Readiness for Transition of Care  Outcome: Progressing  Goal: Plan of Care Review  Outcome:  Progressing  Goal: Patient-Specific Goal (Individualized)  Outcome: Progressing  Goal: Absence of Hospital-Acquired Illness or Injury  Outcome: Progressing  Intervention: Identify and Manage Fall Risk  Recent Flowsheet Documentation  Taken 5/15/2025 1459 by Evelyn Perez RN  Safety Promotion/Fall Prevention:   clutter free environment maintained   room organization consistent   safety round/check completed  Taken 5/15/2025 1450 by Evelyn Perez RN  Safety Promotion/Fall Prevention:   clutter free environment maintained   room organization consistent   safety round/check completed  Taken 5/15/2025 1245 by Evelyn Perez RN  Safety Promotion/Fall Prevention:   clutter free environment maintained   room organization consistent   safety round/check completed  Taken 5/15/2025 0800 by Evelyn Perez RN  Safety Promotion/Fall Prevention: patient off unit  Intervention: Prevent Skin Injury  Recent Flowsheet Documentation  Taken 5/15/2025 1058 by Evelyn Perez RN  Body Position: supine  Skin Protection: protective footwear used  Intervention: Prevent Infection  Recent Flowsheet Documentation  Taken 5/15/2025 1459 by Evelyn Perez RN  Infection Prevention: rest/sleep promoted  Taken 5/15/2025 1450 by Evelyn Perez RN  Infection Prevention: rest/sleep promoted  Taken 5/15/2025 1245 by Evelyn Perez RN  Infection Prevention: rest/sleep promoted  Taken 5/15/2025 1058 by Evelyn ePrez RN  Infection Prevention: rest/sleep promoted  Goal: Optimal Comfort and Wellbeing  Outcome: Progressing  Intervention: Provide Person-Centered Care  Recent Flowsheet Documentation  Taken 5/15/2025 1058 by Eevlyn Perez RN  Trust Relationship/Rapport:   care explained   questions answered   questions encouraged  Goal: Readiness for Transition of Care  Outcome: Progressing     Problem: Chest Pain  Goal: Resolution of Chest Pain Symptoms  Outcome: Progressing   Goal Outcome Evaluation:

## 2025-05-15 NOTE — CONSULTS
CARDIOLOGY CONSULT      Requesting Provider    Dipti Kang MD      PATIENT IDENTIFICATION    Name: Flip Vang  Age: 59 y.o. Sex: male : 1965  MRN: [id number]    REASON FOR CONSULTATION:  Chest pain    CHIEF COMPLAINT:  Chest pain    HISTORY OF PRESENT ILLNESS:   Patient presented to the emergency room at [facility] on May 14, 2025 with complaints of chest pain and dyspnea.  He reports feeling tired, dizzy and having some left arm numbness and tingling.  His blood pressure was elevated at home.  Patient has had recent EGD due to some epigastric and abdominal discomfort.    Patient underwent stress Myoview today stress Myoview showed no evidence of ischemia.  Patient had echocardiogram done today which showed his EF to be 55%.  There was mild MR otherwise no significant valvular flow abnormalities.    In 2024 patient had a CT calcium score that was 39.2.  39.2 was in the LAD other arteries were 0.    Patient is treated for hypertension and takes lisinopril at home.    On admission MI was ruled out.  TSH was within normal limits.  Total cholesterol 222, HDL 56  triglycerides 161.  Hemoglobin was 11.5 D-dimer was elevated at 1.38 CTA of his chest PE protocol was negative for PE.  There was no acute intrathoracic process.    IMPRESSIONS    Chest pain  Atypical for angina  Describes as pressure across chest is worse and reproducible with palpation  Not related to activity  Stress Myoview today with no reversible ischemia  Exercised for 6 minutes of Heriberto protocol with no EKG changes suggesting ischemia  CT calcium score 2024 was 39.2  CT PE protocol negative for PE or acute cardiopulmonary process  Recent EGD in 2025 reported to be normal  Denies recent strenuous activity    Hypertension  Just recently started on lisinopril  Most recent blood [id number]    Dyslipidemia  HDL 56, , total 222  Consider statin    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    If ongoing chest pain would consider additional ischemic evaluation with coronary CTA or cardiac catheterization.    Recommend aggressive risk factor modification    Additional recommendations per Dr. Moore    Medical History    Past Medical History:   Diagnosis Date    Laryngopharyngeal reflux (LPR)         Surgical History    Past Surgical History:   Procedure Laterality Date    COLONOSCOPY      COLONOSCOPY N/A 1/29/2024    Procedure: COLONOSCOPY WITH POLYPECTOMY X1;  Surgeon: Tre Fitzpatrick MD;  Location: [facility] ENDOSCOPY;  Service: Gastroenterology;  Laterality: N/A;  POST: POLYP    ENDOSCOPY N/A 1/29/2024    Procedure: ESOPHAGOGASTRODUODENOSCOPY WITH BIOPSY X1 AREA AND DILATION UP TO 50;  Surgeon: Tre Fitzpatrick MD;  Location: [facility] ENDOSCOPY;  Service: Gastroenterology;  Laterality: N/A;  POST: GASTRITIS, ESOPHAGEAL STRICTURE        Family History    Family History   Problem Relation Age of Onset    Cancer Father        Social History    Social History     Tobacco Use    Smoking status: Never    Smokeless tobacco: Never   Substance Use Topics    Alcohol use: Yes     Comment: Occasional        Allergies    No Known Allergies    REVIEW OF SYSTEMS:  Pertinent items are noted in HPI    OBJECTIVE     VITAL SIGNS:  Visit Vitals  /83 (BP Location: Right arm, Patient Position: Lying)   Pulse 69   Temp 97.7 °F (36.5 °C) (Oral)   Resp 22   Ht 182.9 cm (72.01\")   Wt 78 kg (172 lb)   SpO2 97%   BMI 23.32 kg/m²     Oxygen Therapy  SpO2: 97 %  Pulse Oximetry Type: Continuous  Device (Oxygen Therapy): room air  Flowsheet Rows      Flowsheet Row First Filed Value   Admission Height 182.9 cm (72\") Documented at 05/14/2025 1912   Admission Weight 78 kg (172 lb) Documented at 05/15/2025 0013      " "    Intake & Output (last 3 days)         05/12 0701 05/13 0700 05/13 0701 05/14 0700 05/14 0701  05/15 0700 05/15 0701 05/16 0700            Urine Unmeasured Occurrence   1 x           Lines, Drains & Airways       Active LDAs       Name Placement date Placement time Site Days    Peripheral IV 05/14/25 2045 20 G Left Antecubital 05/14/25 2045  Antecubital  less than 1                  /83 (BP Location: Right arm, Patient Position: Lying)   Pulse 69   Temp 97.7 °F (36.5 °C) (Oral)   Resp 22   Ht 182.9 cm (72.01\")   Wt 78 kg (172 lb)   SpO2 97%   BMI 23.32 kg/m²     INTAKE/OUTPUT:    Intake/Output this shift:  No intake/output data recorded.  Intake/Output last 3 shifts:  No intake/output data recorded.      PHYSICAL EXAM:     General: Alert, cooperative, no distress, appears stated age  Head:  Normocephalic, atraumatic, mucous membranes moist  Eyes:  Conjunctivae/corneas clear, EOM's intact     Neck:  Supple, no JVD     Lungs: Clear to auscultation bilaterally, no wheezes, rhonchi or rales are noted  Chest wall: No tenderness  Heart::  Regular rate and rhythm, S1 and S2 normal, no murmur, rub or gallop  Abdomen: Soft, nontender, nondistended, bowel sounds active  Extremities: No cyanosis, clubbing, or edema  Pulses: 2+ and symmetric all extremities  Skin:  No rashes or lesions  Neuro/psych: A&O x3.      Scheduled Meds:      [Held by provider] lisinopril, 10 mg, Oral, Daily  pantoprazole, 40 mg, Oral, Q AM  sodium chloride, 10 mL, Intravenous, Q12H        Continuous Infusions:         PRN Meds:      acetaminophen    senna-docusate sodium **AND** polyethylene glycol **AND** bisacodyl **AND** bisacodyl    hydrALAZINE    ipratropium-albuterol    nitroglycerin    ondansetron ODT **OR** ondansetron    sodium chloride    sodium chloride     Data Review:     X-rays, labs reviewed personally by provider.    CBC    Results from last 7 days   Lab Units 05/15/25  0041 05/14/25 2015   WBC 10*3/mm3 6.84 8.08 " "  HEMOGLOBIN g/dL 11.5* 13.1   PLATELETS 10*3/mm3 212 256     Cr Clearance Estimated Creatinine Clearance: 64.7 mL/min (A) (by C-G formula based on SCr of 1.34 mg/dL (H)).  Coag     HbA1C No results found for: \"HGBA1C\"  Blood Glucose No results found for: \"POCGLU\"  Infection     CMP   Results from last 7 days   Lab Units 05/15/25  0041 05/14/25  2015   SODIUM mmol/L 141 140   POTASSIUM mmol/L 4.3 4.7   CHLORIDE mmol/L 104 104   CO2 mmol/L 26.3 27.7   BUN mg/dL 13 14   CREATININE mg/dL 1.34* 1.32*   GLUCOSE mg/dL 105* 102*   ALBUMIN g/dL  --  4.6   BILIRUBIN mg/dL  --  0.4   ALK PHOS U/L  --  95   AST (SGOT) U/L  --  23   ALT (SGPT) U/L  --  17   LIPASE U/L  --  32     ABG      UA    Results from last 7 days   Lab Units 05/14/25 2015   NITRITE UA  Negative     OTTONIEL  No results found for: \"POCMETH\", \"POCAMPHET\", \"POCBARBITUR\", \"POCBENZO\", \"POCCOCAINE\", \"POCOPIATES\", \"POCOXYCODO\", \"POCPHENCYC\", \"POCPROPOXY\", \"POCTHC\", \"POCTRICYC\"  Lysis Labs   Results from last 7 days   Lab Units 05/15/25  0041 05/14/25  2015   HEMOGLOBIN g/dL 11.5* 13.1   PLATELETS 10*3/mm3 212 256   CREATININE mg/dL 1.34* 1.32*     Radiology(recent) CT Angiogram Chest Pulmonary Embolism  Result Date: 5/14/2025  Impression: 1. Negative for pulmonary embolus. 2. No acute intrathoracic process. 3. Chronic incidental findings above. Electronically Signed: Sreekanth Huang MD  5/14/2025 10:20 PM EDT  Workstation ID: [id number]    XR Chest 1 View  Result Date: 5/14/2025  Impression: No acute process. Electronically Signed: Sreekanth Huang MD  5/14/2025 9:11 PM EDT  Workstation ID: [id number]        Results from last 7 days   Lab Units 05/15/25  0041   HSTROP T ng/L 8       ECG/EMG Results (most recent)       Procedure Component Value Units Date/Time    Telemetry Scan [[id number]] Resulted: 05/14/25     Updated: 05/15/25 0023    Telemetry Scan [[id number]] Resulted: 05/14/25     Updated: 05/15/25 0058    ECG 12 Lead Chest Pain [[id number]] Collected: 05/14/25 1915     " Updated: 05/15/25 0817     QT Interval 344 ms      QTC Interval 440 ms     Narrative:      HEART RATE=98  bpm  RR Wfpenqrq=563  ms  MS Zsfmplam=953  ms  P Horizontal Axis=10  deg  P Front Axis=56  deg  QRSD Interval=85  ms  QT Ivbomrwb=310  ms  ZTqW=841  ms  QRS Axis=3  deg  T Wave Axis=22  deg  - NORMAL ECG -  Sinus rhythm  When compared with ECG of 24-Dec-2023 19:25:34,  No significant change  Electronically Signed By: Aristeo Meng (SELVIN) 2025-05-15 08:16:53  Date and Time of Study:2025-05-14 19:15:33    Adult Transthoracic Echo Complete w/ Color, Spectral and Contrast if necessary per protocol [[id number]] Resulted: 05/15/25 0936     Updated: 05/15/25 0936     EF(MOD-bp) 55.3 %      EF_3D-VOL 55.0 %      LV GLOBAL STRAIN  -13.9 %      LVIDd 4.5 cm      LVIDs 3.1 cm      IVSd 0.91 cm      LVPWd 0.99 cm      FS 31.1 %      IVS/LVPW 0.91 cm      ESV(cubed) 29.1 ml      LV Sys Vol (BSA corrected) 23.6 cm2      EDV(cubed) 88.9 ml      LV Miller Vol (BSA corrected) 49.3 cm2      LV mass(C)d 141.1 grams      LVOT area 3.7 cm2      LVOT diam 2.17 cm      EDV(MOD-sp2) 105.0 ml      EDV(MOD-sp4) 98.5 ml      ESV(MOD-sp2) 46.5 ml      ESV(MOD-sp4) 47.1 ml      SV(MOD-sp2) 58.5 ml      SV(MOD-sp4) 51.4 ml      SVi(MOD-SP2) 29.3 ml/m2      SVi(MOD-SP4) 25.7 ml/m2      SVi (LVOT) 34.5 ml/m2      EF(MOD-sp2) 55.7 %      EF(MOD-sp4) 52.2 %      MV E max alvaro 50.0 cm/sec      MV A max alvaro 64.0 cm/sec      MV dec time 0.17 sec      MV E/A 0.78     Pulm A Revs Dur 0.17 sec      IVRT 98.0 ms      LA ESV Index (BP) 18.3 ml/m2      Med Peak E' Alvaro 10.1 cm/sec      Lat Peak E' Alvaro 13.2 cm/sec      TR max alvaro 209.0 cm/sec      Avg E/e' ratio 4.29     SV(LVOT) 68.9 ml      BH CV ECHO RV FREE WALL STRAIN -22.2 %      RV Base 3.5 cm      RV Mid 3.0 cm      TAPSE (>1.6) 2.09 cm      RV S' 10.6 cm/sec      LA dimension (2D)  3.7 cm      Pulm Sys Alvaro 46.0 cm/sec      Pulm Miller Alvaro 47.1 cm/sec      Pulm S/D 0.98     Pulm A Revs Alvaro 18.7 cm/sec       LV V1 max 85.4 cm/sec      LV V1 max PG 2.9 mmHg      LV V1 mean PG 1.53 mmHg      LV V1 VTI 18.6 cm      Ao pk geoff 111.8 cm/sec      Ao max PG 5.0 mmHg      Ao mean PG 2.9 mmHg      Ao V2 VTI 25.6 cm      REINALDO(I,D) 2.7 cm2      Dimensionless Index 0.73 (DI)      MV max PG 2.07 mmHg      MV mean PG 1.12 mmHg      MV V2 VTI 14.2 cm      MV P1/2t 36.0 msec      MVA(P1/2t) 6.1 cm2      MVA(VTI) 4.8 cm2      MV dec slope 476.0 cm/sec2      MR max geoff 446.0 cm/sec      MR max PG 79.6 mmHg      TR max PG 17.5 mmHg      RVSP(TR) 20.5 mmHg      RAP systole 3.0 mmHg      RV V1 max PG 2.43 mmHg      RV V1 max 77.9 cm/sec      RV V1 VTI 18.5 cm      PA V2 max 104.3 cm/sec      PA acc time 0.11 sec      Sinus 3.1 cm      STJ 2.28 cm     Narrative:        Estimated right ventricular systolic pressure from tricuspid   regurgitation is normal (<35 mmHg).      Impression:                  Imaging:  Imaging Results (Last 72 Hours)       Procedure Component Value Units Date/Time    CT Angiogram Chest Pulmonary Embolism [[id number]] Collected: 05/14/25 2216     Updated: 05/14/25 2222    Narrative:      CT ANGIOGRAM CHEST PULMONARY EMBOLISM    Date of Exam: 5/14/2025 9:33 PM EDT    Indication: elevated dimer, cp, dyspnea.    Comparison: Chest radiograph 5/14/2025    Technique: Axial CT images were obtained of the chest after the uneventful intravenous administration of iodinated contrast utilizing pulmonary embolism protocol.  In addition, a 3-D volume rendered image was created for interpretation.  Sagittal and   coronal reconstructions were performed.  Automated exposure control and iterative reconstruction methods were used.    Findings:  Soft tissues of the visualized lower neck are unremarkable. Heart size normal. Mild coronary artery calcification. Negative for pericardial effusion. No pleural effusion. The pulmonary arteries are well opacified with contrast. Negative for pulmonary   embolus. No mediastinal, hilar, or axillary  adenopathy.    Trachea and mainstem bronchi are patent. No pneumothorax. No suspicious pulmonary nodule or mass. Calcified granuloma in the left lower lobe adjacent to the pulmonary fissure.    Upper abdomen demonstrates normal visualized portions of the liver, spleen, adrenal glands, pancreas, and gallbladder. No free fluid in the upper abdomen. The visualized portions of the celiac artery and superior mesenteric artery are patent. No   aggressive osseous lesion or acute fracture.      Impression:      Impression:  1. Negative for pulmonary embolus.  2. No acute intrathoracic process.  3. Chronic incidental findings above.            Electronically Signed: Sreekanth Huang MD    5/14/2025 10:20 PM EDT    Workstation ID: [id number]    XR Chest 1 View [[id number]] Collected: 05/14/25 2111     Updated: 05/14/25 2113    Narrative:      XR CHEST 1 VW    Date of Exam: 5/14/2025 8:12 PM EDT    Indication: Chest pain, dyspnea    Comparison: 12/24/2023    Findings:  The cardiomediastinal silhouette is within normal limits. Lungs are clear. No focal consolidation, pneumothorax, or significant pleural effusion. Osseous structures grossly intact. Calcified left midlung granuloma.      Impression:      Impression:  No acute process.          Electronically Signed: Sreekanth Huang MD    5/14/2025 9:11 PM EDT    Workstation ID: [id number]                    Richard Moore DO  05/15/25  09:37 EDT      Cardiology attending:  Seen and examined.  Chart and labs reviewed. Independent interpretations of cardiac testing was performed. History and exam findings are verified with above changes noted.  Assessment and plan notated by APC after being formulated by attending consultant.  Note that greater than 50% of the time spent in care of the patient was provided by attending consultant.    Patient reports continued chest discomfort despite negative CV workup.  Negative nuclear stress test and normal echocardiogram with only mild MR.   Discussed with patient and family.  Risk benefits and options discussed.  Patient wishes to proceed with cardiac catheterization.  Will plan for tomorrow.    Physical Exam:    General: Alert, cooperative, no distress, appears stated age  Head:  Normocephalic, atraumatic, mucous membranes moist  Eyes:  Conjunctivae/corneas clear, EOM's intact     Neck:  Supple,  no bruit  Lungs:            Clear to auscultation bilaterally, no wheezes rhonchi rales are noted  Chest wall: No tenderness  Heart::  Regular rate and rhythm, S1 and S2 normal 1/6 holosystolic murmur.  No rub or gallop  Abdomen: Soft, nontender, nondistended bowel sounds active  Extremities: No cyanosis, clubbing, or edema  Pulses:            2+ and symmetric all extremities  Skin:  No rashes or lesions  Neuro/psych: A&O x3. CN II through XII are grossly intact with appropriate affect

## 2025-05-15 NOTE — ED PROVIDER NOTES
Subjective   History of Present Illness  Patient is a 59-year-old male with PMH of HTN presenting to the ED for chest pain and dyspnea worsening this evening.  Patient states earlier today he was feeling fatigued, dizzy, and felt like he was having left arm numbness and tingling.  At that time he took his blood pressure and it was 160/100, took his blood pressure medication lisinopril and started having some chest discomfort.  Patient states the dizziness and left arm numbness has resolved however he is still having intermittent chest tightness.  He states this tightness has been coming and going for a couple weeks.  He also reports nausea, chills, and shallow breathing.  He reports a history of epigastric abdominal pain, had a scope done recently to evaluate this.  He also reports slight dysuria.  He denies any fever, diarrhea, constipation, or hematuria.        Review of Systems   Constitutional:  Positive for chills and fatigue. Negative for fever.   Respiratory:  Positive for shortness of breath.    Cardiovascular:  Positive for chest pain.   Gastrointestinal:  Positive for abdominal pain and nausea. Negative for constipation, diarrhea and vomiting.   Genitourinary:  Positive for dysuria. Negative for hematuria.       Past Medical History:   Diagnosis Date    Laryngopharyngeal reflux (LPR)        No Known Allergies    Past Surgical History:   Procedure Laterality Date    COLONOSCOPY      COLONOSCOPY N/A 1/29/2024    Procedure: COLONOSCOPY WITH POLYPECTOMY X1;  Surgeon: Tre Fitzpatrick MD;  Location: [facility] ENDOSCOPY;  Service: Gastroenterology;  Laterality: N/A;  POST: POLYP    ENDOSCOPY N/A 1/29/2024    Procedure: ESOPHAGOGASTRODUODENOSCOPY WITH BIOPSY X1 AREA AND DILATION UP TO 50;  Surgeon: Tre Fitzpatrick MD;  Location: [facility] ENDOSCOPY;  Service: Gastroenterology;  Laterality: N/A;  POST: GASTRITIS, ESOPHAGEAL STRICTURE       Family History   Problem Relation Age of Onset    Cancer Father   "      Social History     Socioeconomic History    Marital status: Single   Tobacco Use    Smoking status: Never    Smokeless tobacco: Never   Vaping Use    Vaping status: Never Used   Substance and Sexual Activity    Alcohol use: Yes     Comment: Occasional    Drug use: Never    Sexual activity: Defer           Objective   Physical Exam  Constitutional:       Appearance: Normal appearance. He is well-developed.   HENT:      Head: Normocephalic and atraumatic.      Mouth/Throat:      Mouth: Mucous membranes are moist.   Eyes:      Extraocular Movements: Extraocular movements intact.   Cardiovascular:      Rate and Rhythm: Normal rate and regular rhythm.      Pulses: Normal pulses.      Heart sounds: Normal heart sounds.   Pulmonary:      Effort: Pulmonary effort is normal.      Breath sounds: Normal breath sounds.   Abdominal:      General: Bowel sounds are normal.      Palpations: Abdomen is soft.      Tenderness: There is no abdominal tenderness.   Musculoskeletal:         General: Normal range of motion.      Cervical back: Normal range of motion.      Right lower leg: No tenderness. No edema.      Left lower leg: No tenderness. No edema.   Skin:     General: Skin is warm and dry.      Capillary Refill: Capillary refill takes less than 2 seconds.   Neurological:      General: No focal deficit present.      Mental Status: He is alert and oriented to person, place, and time.   Psychiatric:         Mood and Affect: Mood normal.         Behavior: Behavior normal.         Procedures           ED Course  ED Course as of 05/14/25 2348   Wed May 14, 2025   2132 Waiting for CTA [EC]   2257 Spoke with Josy HARRIS with [facility] who agreed to admit patient. [EC]      ED Course User Index  [EC] Emelina Castaneda PA-C      /66   Pulse 64   Temp 98.2 °F (36.8 °C) (Oral)   Resp 16   Ht 182.9 cm (72\")   SpO2 97%   BMI 23.33 kg/m²   Labs Reviewed   COMPREHENSIVE METABOLIC PANEL - Abnormal; Notable for the following " "components:       Result Value    Glucose 102 (*)     Creatinine 1.32 (*)     All other components within normal limits    Narrative:     GFR Categories in Chronic Kidney Disease (CKD)              GFR Category          GFR (mL/min/1.73)    Interpretation  G1                    90 or greater        Normal or high (1)  G2                    60-89                Mild decrease (1)  G3a                   45-59                Mild to moderate decrease  G3b                   30-44                Moderate to severe decrease  G4                    15-29                Severe decrease  G5                    14 or less           Kidney failure    (1)In the absence of evidence of kidney disease, neither GFR category G1 or G2 fulfill the criteria for CKD.    eGFR calculation 2021 CKD-EPI creatinine equation, which does not include race as a factor   D-DIMER, QUANTITATIVE - Abnormal; Notable for the following components:    D-Dimer, Quantitative 1.38 (*)     All other components within normal limits    Narrative:     According to the assay 's published package insert, a normal (<0.50 MCGFEU/mL) D-dimer result in conjunction with a non-high clinical probability assessment, excludes deep vein thrombosis (DVT) and pulmonary embolism (PE) with high sensitivity.    D-dimer values increase with age and this can make VTE exclusion of an older population difficult. To address this, the [employer], based on best available evidence and recent guidelines, recommends that clinicians use age-adjusted D-dimer thresholds in patients greater than 50 years of age with: a) a low probability of PE who do not meet all Pulmonary Embolism Rule Out Criteria, or b) in those with intermediate probability of PE.   The formula for an age-adjusted D-dimer cut-off is \"age/100\".  For example, a 60 year old patient would have an age-adjusted cut-off of 0.60 MCGFEU/mL and an 80 year old 0.80 MCGFEU/mL.   CBC WITH AUTO " DIFFERENTIAL - Abnormal; Notable for the following components:    Lymphocyte % 19.3 (*)     All other components within normal limits   RESPIRATORY PANEL PCR W/ COVID-19 (SARS-COV-2), NP SWAB IN UTM/VTP, 2 HR TAT - Normal    Narrative:     In the setting of a positive respiratory panel with a viral infection PLUS a negative procalcitonin without other underlying concern for bacterial infection, consider observing off antibiotics or discontinuation of antibiotics and continue supportive care. If the respiratory panel is positive for atypical bacterial infection (Bordetella pertussis, Chlamydophila pneumoniae, or Mycoplasma pneumoniae), consider antibiotic de-escalation to target atypical bacterial infection.   LIPASE - Normal   URINALYSIS W/ CULTURE IF INDICATED - Normal    Narrative:     In absence of clinical symptoms, the presence of pyuria, bacteria, and/or nitrites on the urinalysis result does not correlate with infection.  Urine microscopic not indicated.   TROPONIN - Normal    Narrative:     High Sensitive Troponin T Reference Range:  <14.0 ng/L- Negative Female for AMI  <22.0 ng/L- Negative Male for AMI  >=14 - Abnormal Female indicating possible myocardial injury.  >=22 - Abnormal Male indicating possible myocardial injury.   Clinicians would have to utilize clinical acumen, EKG, Troponin, and serial changes to determine if it is an Acute Myocardial Infarction or myocardial injury due to an underlying chronic condition.        MAGNESIUM - Normal   TSH RFX ON ABNORMAL TO FREE T4 - Normal   HIGH SENSITIVITIY TROPONIN T 1HR - Normal    Narrative:     High Sensitive Troponin T Reference Range:  <14.0 ng/L- Negative Female for AMI  <22.0 ng/L- Negative Male for AMI  >=14 - Abnormal Female indicating possible myocardial injury.  >=22 - Abnormal Male indicating possible myocardial injury.   Clinicians would have to utilize clinical acumen, EKG, Troponin, and serial changes to determine if it is an Acute Myocardial  Infarction or myocardial injury due to an underlying chronic condition.        CBC AND DIFFERENTIAL    Narrative:     The following orders were created for panel order CBC & Differential.  Procedure                               Abnormality         Status                     ---------                               -----------         ------                     CBC Auto Differential[[id number]]        Abnormal            Final result                 Please view results for these tests on the individual orders.   EXTRA TUBES    Narrative:     The following orders were created for panel order Extra Tubes.  Procedure                               Abnormality         Status                     ---------                               -----------         ------                     Gold Top - SST[[id number]]                                   Final result                 Please view results for these tests on the individual orders.   GOLD TOP - SST     Medications   aspirin chewable tablet 324 mg (324 mg Oral Given 5/14/25 2007)   iopamidol (ISOVUE-370) 76 % injection 100 mL (100 mL Intravenous Given 5/14/25 2153)     CT Angiogram Chest Pulmonary Embolism  Result Date: 5/14/2025  Impression: 1. Negative for pulmonary embolus. 2. No acute intrathoracic process. 3. Chronic incidental findings above. Electronically Signed: Sreekanth Huang MD  5/14/2025 10:20 PM EDT  Workstation ID: [id number]    XR Chest 1 View  Result Date: 5/14/2025  Impression: No acute process. Electronically Signed: Sreekanth Huang MD  5/14/2025 9:11 PM EDT  Workstation ID: [id number]                HEART Score: 2                                      Medical Decision Making  Chart review: 4/16/25 Dr. Vaz GI: Mr. Carrillo is a 59-year-old male with past medical history significant for longstanding abdominal pain who presents to [facility].  He underwent upper endoscopy on 11 March that showed only a subtle stricture. This is dilated. It is not seem to me that this  has caused any improvement in his symptoms.  On discussion with him, it seems to me that he has epigastric discomfort. However he points directly at his xiphoid process he does suggest that he has intermittent chest comfort but seems to be somewhat different. This radiates outward from the mid sternum around his chest. He has had a CT scan for coronary calcium score, but he has never seen a cardiologist.     Current Assessment & Plan   I think the best place to start for him would be to treat for costochondritis. I asked him to try Tylenol and ibuprofen, at low doses to see if these will help. I do also want him to see a cardiologist given his family history, despite the imaging reported above. I would also like for him to stop his PPI. They will need that he needs wireless pH monitoring in the future, but for now I do not think that this is necessary.     Patient presented to the ED for the above complaint.    Patient underwent the above exam and evaluation.    While in the ED patient was placed in a gown and IV was established.  EKG, chest x-ray, blood work was obtained to assess for arrhythmia, MI, electro abnormality, dehydration, infection, blood clot.  Patient had elevated dimer, CTA of chest was obtained to assess for PE.  Patient was given p.o. ASA.  Upon reevaluation patient is resting comfortably, vital stable, reporting chest pain a 1 out of 10.  Discussed findings with patient and daughter at bedside, heart score of 2.  Recommended admitting patient for cardiac evaluation as he has never had this done in the past.  Patient voiced understanding, agreeable with dispo plan.  Spoke with Josy HARRIS with [facility] who agreed to admit patient.    EKG independently interpreted by Dr. Meng showing sinus rhythm, rate 98, VT interval 158, QTc 440, compared to previous EKG 12/24/2023 showing sinus rhythm, rate 96.  Labs were independently interpreted by myself and deemed remarkable for the following: No leukocytosis,  hemoglobin stable at 13.1, creatinine 1.32, no electrolyte abnormalities, lipase 32, initial troponin 6, repeat troponin 7, D-dimer 1.38, urinalysis negative for hematuria or bacteria, respiratory panel negative for any viral infection.  Chest x-ray, CTA of chest independently interpreted by Dr. Meng and reviewed by myself showing: Chest x-ray showed no cardiomegaly, pneumothorax, consolidations.  CTA of chest showed no PE.    Appropriate PPE was worn during each patient encounter.    Note Disclaimer: At [facility], we believe that sharing information builds trust and better relationships. You are receiving this note because you are receiving care at [facility] or recently visited. It is possible you will see health information before a provider has talked with you about it. This kind of information can be easy to misunderstand. To help you fully understand what it means for your health, we urge you to discuss this note with your provider.    Discussed this patient with Dr. Meng who agrees with plan.      Problems Addressed:  Chest pain, unspecified type: complicated acute illness or injury  Dyspnea, unspecified type: complicated acute illness or injury  Fatigue, unspecified type: complicated acute illness or injury    Amount and/or Complexity of Data Reviewed  Labs: ordered.  Radiology: ordered.    Risk  OTC drugs.  Prescription drug management.  Decision regarding hospitalization.        Final diagnoses:   Chest pain, unspecified type   Dyspnea, unspecified type   Fatigue, unspecified type       ED Disposition  ED Disposition       ED Disposition   Decision to Admit    Condition   --    Comment   Level of Care: Telemetry [5]   Admitting Physician: TRACIE BREWER [[id number]]   Attending Physician: TRACIE BREWER [[id number]]                 No follow-up provider specified.       Medication List      No changes were made to your prescriptions during this visit.            Emelina Castaneda PA-C  05/14/25 [id number]

## 2025-05-15 NOTE — H&P
Patient Care Team:  Aristeo Rodriguez MD as PCP - General (Family Medicine)  Aristeo Rodriguez MD as PCP - Family Medicine  Aristeo Rodriguez MD    Chief complaint chest pain    Subjective     Patient is a 59 y.o. male with pmh of hypertension, reportedly activity induced asthma, and laryngopharyngeal reflux who present to the ER with concern of chest pain after working on his truck.  He reported that he intially felt very fatigued along with some left arm tingling. He then noted some midsternal chest pressure.  He took his BP at home and it was 160's/100.  He reported that his breathing has felt shallow.  He reports his GI dr had suggested a cardiac workup and has an upcoming [facility] to [facility].  He has recently started back on PPI after trialing off of it.  He stated he feels his chest is at time tender to the touch and has intermittent pain at his xiphoid process- he cannot correlate timing or intensifying factors.    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      In the ER, his Troponin were negative x 2. Electrolytes WNL.  Creatinine elevated at 1.32, GFR 61.7, Baseline unknown.  Glucose 102.  tSH WNL.  D.Dimer elevated at 1.38, CT PE protocol negative for PE, no acute findings. /96 upon arrival, but decreased without further intervention.  He will be admitted for further cardiac workup.    Review of Systems   Constitutional:  Negative for activity change and fever.   Respiratory:  Positive for chest tightness and shortness of breath.    Cardiovascular:  Positive for chest pain. Negative for palpitations and leg swelling.   Gastrointestinal:  Positive for abdominal pain.   Genitourinary:  Negative for difficulty urinating.   Neurological:  Negative for dizziness.          History  Past Medical History:   Diagnosis Date    Laryngopharyngeal reflux (LPR)      Past Surgical History:   Procedure Laterality Date     COLONOSCOPY      COLONOSCOPY N/A 1/29/2024    Procedure: COLONOSCOPY WITH POLYPECTOMY X1;  Surgeon: Tre Fitzpatrick MD;  Location: [facility] ENDOSCOPY;  Service: Gastroenterology;  Laterality: N/A;  POST: POLYP    ENDOSCOPY N/A 1/29/2024    Procedure: ESOPHAGOGASTRODUODENOSCOPY WITH BIOPSY X1 AREA AND DILATION UP TO 50;  Surgeon: Tre Fitzpatrick MD;  Location: [facility] ENDOSCOPY;  Service: Gastroenterology;  Laterality: N/A;  POST: GASTRITIS, ESOPHAGEAL STRICTURE     Family History   Problem Relation Age of Onset    Cancer Father      Social History     Tobacco Use    Smoking status: Never    Smokeless tobacco: Never   Vaping Use    Vaping status: Never Used   Substance Use Topics    Alcohol use: Yes     Comment: Occasional    Drug use: Never     Medications Prior to Admission   Medication Sig Dispense Refill Last Dose/Taking    lisinopril (PRINIVIL,ZESTRIL) 10 MG tablet Take 1 tablet by mouth Daily.   5/14/2025 Morning    omeprazole (priLOSEC) 20 MG capsule Take 1 capsule by mouth Daily. 20 capsule 0 5/14/2025 Morning     Allergies:  Patient has no known allergies.    Objective     Vital Signs  Temp:  [98.2 °F (36.8 °C)] 98.2 °F (36.8 °C)  Heart Rate:  [] 60  Resp:  [16-24] 24  BP: (112-165)/(66-96) 145/79     Physical Exam:      General Appearance:    Alert, cooperative, in no acute distress   Head:    Normocephalic, without obvious abnormality, atraumatic   Eyes:            Lids and lashes normal, conjunctivae and sclerae normal, no   icterus, no pallor, corneas clear, PERRLA   Ears:    Ears appear intact with no abnormalities noted   Throat:   No oral lesions, no thrush, oral mucosa moist   Neck:   No adenopathy, supple, trachea midline, no thyromegaly, no   carotid bruit, no JVD   Lungs:     Clear to auscultation,respirations regular, even and                  unlabored    Heart:    Regular rhythm and normal rate, normal S1 and S2, no            murmur, no gallop, no rub, no click   Chest  Wall:    No abnormalities observed   Abdomen:     Normal bowel sounds, no masses, no organomegaly, soft        non-tender, non-distended, no guarding, no rebound                tenderness   Extremities:   Moves all extremities well, no edema, no cyanosis, no             redness   Pulses:   Pulses palpable and equal bilaterally   Skin:   No bleeding, bruising or rash   Lymph nodes:   No palpable adenopathy   Neurologic:   Cranial nerves 2 - 12 grossly intact, sensation intact, DTR       present and equal bilaterally       Results Review:     Imaging Results (Last 24 Hours)       Procedure Component Value Units Date/Time    CT Angiogram Chest Pulmonary Embolism [[id number]] Collected: 05/14/25 2216     Updated: 05/14/25 2222    Narrative:      CT ANGIOGRAM CHEST PULMONARY EMBOLISM    Date of Exam: 5/14/2025 9:33 PM EDT    Indication: elevated dimer, cp, dyspnea.    Comparison: Chest radiograph 5/14/2025    Technique: Axial CT images were obtained of the chest after the uneventful intravenous administration of iodinated contrast utilizing pulmonary embolism protocol.  In addition, a 3-D volume rendered image was created for interpretation.  Sagittal and   coronal reconstructions were performed.  Automated exposure control and iterative reconstruction methods were used.    Findings:  Soft tissues of the visualized lower neck are unremarkable. Heart size normal. Mild coronary artery calcification. Negative for pericardial effusion. No pleural effusion. The pulmonary arteries are well opacified with contrast. Negative for pulmonary   embolus. No mediastinal, hilar, or axillary adenopathy.    Trachea and mainstem bronchi are patent. No pneumothorax. No suspicious pulmonary nodule or mass. Calcified granuloma in the left lower lobe adjacent to the pulmonary fissure.    Upper abdomen demonstrates normal visualized portions of the liver, spleen, adrenal glands, pancreas, and gallbladder. No free fluid in the upper abdomen. The  visualized portions of the celiac artery and superior mesenteric artery are patent. No   aggressive osseous lesion or acute fracture.      Impression:      Impression:  1. Negative for pulmonary embolus.  2. No acute intrathoracic process.  3. Chronic incidental findings above.            Electronically Signed: Sreekanth Huang MD    5/14/2025 10:20 PM EDT    Workstation ID: [id number]    XR Chest 1 View [[id number]] Collected: 05/14/25 2111     Updated: 05/14/25 2113    Narrative:      XR CHEST 1 VW    Date of Exam: 5/14/2025 8:12 PM EDT    Indication: Chest pain, dyspnea    Comparison: 12/24/2023    Findings:  The cardiomediastinal silhouette is within normal limits. Lungs are clear. No focal consolidation, pneumothorax, or significant pleural effusion. Osseous structures grossly intact. Calcified left midlung granuloma.      Impression:      Impression:  No acute process.          Electronically Signed: Sreekanth Huang MD    5/14/2025 9:11 PM EDT    Workstation ID: [id number]             Lab Results (last 24 hours)       Procedure Component Value Units Date/Time    Basic Metabolic Panel [[id number]]  (Abnormal) Collected: 05/15/25 0041    Specimen: Blood from Arm, Right Updated: 05/15/25 0123     Glucose 105 mg/dL      BUN 13 mg/dL      Creatinine 1.34 mg/dL      Sodium 141 mmol/L      Potassium 4.3 mmol/L      Chloride 104 mmol/L      CO2 26.3 mmol/L      Calcium 8.9 mg/dL      BUN/Creatinine Ratio 9.7     Anion Gap 10.7 mmol/L      eGFR 60.6 mL/min/1.73     Narrative:      GFR Categories in Chronic Kidney Disease (CKD)              GFR Category          GFR (mL/min/1.73)    Interpretation  G1                    90 or greater        Normal or high (1)  G2                    60-89                Mild decrease (1)  G3a                   45-59                Mild to moderate decrease  G3b                   30-44                Moderate to severe decrease  G4                    15-29                Severe decrease  G5                     14 or less           Kidney failure    (1)In the absence of evidence of kidney disease, neither GFR category G1 or G2 fulfill the criteria for CKD.    eGFR calculation 2021 CKD-EPI creatinine equation, which does not include race as a factor    High Sensitivity Troponin T [[id number]]  (Normal) Collected: 05/15/25 0041    Specimen: Blood from Arm, Right Updated: 05/15/25 0123     HS Troponin T 8 ng/L     Narrative:      High Sensitive Troponin T Reference Range:  <14.0 ng/L- Negative Female for AMI  <22.0 ng/L- Negative Male for AMI  >=14 - Abnormal Female indicating possible myocardial injury.  >=22 - Abnormal Male indicating possible myocardial injury.   Clinicians would have to utilize clinical acumen, EKG, Troponin, and serial changes to determine if it is an Acute Myocardial Infarction or myocardial injury due to an underlying chronic condition.         Lipid Panel [[id number]]  (Abnormal) Collected: 05/15/25 0041    Specimen: Blood from Arm, Right Updated: 05/15/25 0123     Total Cholesterol 222 mg/dL      Triglycerides 161 mg/dL      HDL Cholesterol 56 mg/dL      LDL Cholesterol  137 mg/dL      VLDL Cholesterol 29 mg/dL      LDL/HDL Ratio 2.39    Narrative:      Cholesterol Reference Ranges  (U.S. Department of Health and Human Services ATP III Classifications)    Desirable          <200 mg/dL  Borderline High    200-239 mg/dL  High Risk          >240 mg/dL      Triglyceride Reference Ranges  (U.S. Department of Health and Human Services ATP III Classifications)    Normal           <150 mg/dL  Borderline High  150-199 mg/dL  High             200-499 mg/dL  Very High        >500 mg/dL    HDL Reference Ranges  (U.S. Department of Health and Human Services ATP III Classifications)    Low     <40 mg/dl (major risk factor for CHD)  High    >60 mg/dl ('negative' risk factor for CHD)        LDL Reference Ranges  (U.S. Department of Health and Human Services ATP III Classifications)    Optimal          <100  mg/dL  Near Optimal     100-129 mg/dL  Borderline High  130-159 mg/dL  High             160-189 mg/dL  Very High        >189 mg/dL    LDL is calculated using the NIH LDL-C calculation.      CBC (No Diff) [[id number]]  (Abnormal) Collected: 05/15/25 0041    Specimen: Blood from Arm, Right Updated: 05/15/25 0103     WBC 6.84 10*3/mm3      RBC 3.79 10*6/mm3      Hemoglobin 11.5 g/dL      Hematocrit 35.5 %      MCV 93.7 fL      MCH 30.3 pg      MCHC 32.4 g/dL      RDW 13.0 %      RDW-SD 44.5 fl      MPV 9.2 fL      Platelets 212 10*3/mm3     High Sensitivity Troponin T 1Hr [[id number]]  (Normal) Collected: 05/14/25 2120    Specimen: Blood Updated: 05/14/25 2142     HS Troponin T 7 ng/L      Troponin T Numeric Delta 1 ng/L     Narrative:      High Sensitive Troponin T Reference Range:  <14.0 ng/L- Negative Female for AMI  <22.0 ng/L- Negative Male for AMI  >=14 - Abnormal Female indicating possible myocardial injury.  >=22 - Abnormal Male indicating possible myocardial injury.   Clinicians would have to utilize clinical acumen, EKG, Troponin, and serial changes to determine if it is an Acute Myocardial Infarction or myocardial injury due to an underlying chronic condition.         Respiratory Panel PCR w/COVID-19(SARS-CoV-2) MANN/MIKAYLA/SELVIN/PAD/COR/VICKY In-House, NP Swab in UTM/VTM, 2 HR TAT - Swab, Nasopharynx [[id number]]  (Normal) Collected: 05/14/25 2015    Specimen: Swab from Nasopharynx Updated: 05/14/25 2111     ADENOVIRUS, PCR Not Detected     Coronavirus 229E Not Detected     Coronavirus HKU1 Not Detected     Coronavirus NL63 Not Detected     Coronavirus OC43 Not Detected     COVID19 Not Detected     Human Metapneumovirus Not Detected     Human Rhinovirus/Enterovirus Not Detected     Influenza A PCR Not Detected     Influenza B PCR Not Detected     Parainfluenza Virus 1 Not Detected     Parainfluenza Virus 2 Not Detected     Parainfluenza Virus 3 Not Detected     Parainfluenza Virus 4 Not Detected     RSV, PCR Not  Detected     Bordetella pertussis pcr Not Detected     Bordetella parapertussis PCR Not Detected     Chlamydophila pneumoniae PCR Not Detected     Mycoplasma pneumo by PCR Not Detected    Narrative:      In the setting of a positive respiratory panel with a viral infection PLUS a negative procalcitonin without other underlying concern for bacterial infection, consider observing off antibiotics or discontinuation of antibiotics and continue supportive care. If the respiratory panel is positive for atypical bacterial infection (Bordetella pertussis, Chlamydophila pneumoniae, or Mycoplasma pneumoniae), consider antibiotic de-escalation to target atypical bacterial infection.    Comprehensive Metabolic Panel [[id number]]  (Abnormal) Collected: 05/14/25 2015    Specimen: Blood Updated: 05/14/25 2053     Glucose 102 mg/dL      BUN 14 mg/dL      Creatinine 1.32 mg/dL      Sodium 140 mmol/L      Potassium 4.7 mmol/L      Chloride 104 mmol/L      CO2 27.7 mmol/L      Calcium 9.5 mg/dL      Total Protein 7.1 g/dL      Albumin 4.6 g/dL      ALT (SGPT) 17 U/L      AST (SGOT) 23 U/L      Alkaline Phosphatase 95 U/L      Total Bilirubin 0.4 mg/dL      Globulin 2.5 gm/dL      A/G Ratio 1.8 g/dL      BUN/Creatinine Ratio 10.6     Anion Gap 8.3 mmol/L      eGFR 61.7 mL/min/1.73     Narrative:      GFR Categories in Chronic Kidney Disease (CKD)              GFR Category          GFR (mL/min/1.73)    Interpretation  G1                    90 or greater        Normal or high (1)  G2                    60-89                Mild decrease (1)  G3a                   45-59                Mild to moderate decrease  G3b                   30-44                Moderate to severe decrease  G4                    15-29                Severe decrease  G5                    14 or less           Kidney failure    (1)In the absence of evidence of kidney disease, neither GFR category G1 or G2 fulfill the criteria for CKD.    eGFR calculation 2021 CKD-EPI  creatinine equation, which does not include race as a factor    Lipase [[id number]]  (Normal) Collected: 05/14/25 2015    Specimen: Blood Updated: 05/14/25 2053     Lipase 32 U/L     High Sensitivity Troponin T [[id number]]  (Normal) Collected: 05/14/25 2015    Specimen: Blood Updated: 05/14/25 2053     HS Troponin T 6 ng/L     Narrative:      High Sensitive Troponin T Reference Range:  <14.0 ng/L- Negative Female for AMI  <22.0 ng/L- Negative Male for AMI  >=14 - Abnormal Female indicating possible myocardial injury.  >=22 - Abnormal Male indicating possible myocardial injury.   Clinicians would have to utilize clinical acumen, EKG, Troponin, and serial changes to determine if it is an Acute Myocardial Infarction or myocardial injury due to an underlying chronic condition.         Magnesium [[id number]]  (Normal) Collected: 05/14/25 2015    Specimen: Blood Updated: 05/14/25 2053     Magnesium 2.2 mg/dL     TSH Rfx On Abnormal To Free T4 [[id number]]  (Normal) Collected: 05/14/25 2015    Specimen: Blood Updated: 05/14/25 2053     TSH 2.490 uIU/mL     D-dimer, Quantitative [[id number]]  (Abnormal) Collected: 05/14/25 2015    Specimen: Blood Updated: 05/14/25 2034     D-Dimer, Quantitative 1.38 MCGFEU/mL     Narrative:      According to the assay 's published package insert, a normal (<0.50 MCGFEU/mL) D-dimer result in conjunction with a non-high clinical probability assessment, excludes deep vein thrombosis (DVT) and pulmonary embolism (PE) with high sensitivity.    D-dimer values increase with age and this can make VTE exclusion of an older population difficult. To address this, the [employer], based on best available evidence and recent guidelines, recommends that clinicians use age-adjusted D-dimer thresholds in patients greater than 50 years of age with: a) a low probability of PE who do not meet all Pulmonary Embolism Rule Out Criteria, or b) in those with intermediate probability of  "PE.   The formula for an age-adjusted D-dimer cut-off is \"age/100\".  For example, a 60 year old patient would have an age-adjusted cut-off of 0.60 MCGFEU/mL and an 80 year old 0.80 MCGFEU/mL.    Extra Tubes [[id number]] Collected: 05/14/25 2015    Specimen: Blood, Venous Line Updated: 05/14/25 2030    Narrative:      The following orders were created for panel order Extra Tubes.  Procedure                               Abnormality         Status                     ---------                               -----------         ------                     Gold Top - SST[[id number]]                                   Final result                 Please view results for these tests on the individual orders.    Gold Top - SST [[id number]] Collected: 05/14/25 2015    Specimen: Blood Updated: 05/14/25 2030     Extra Tube Hold for add-ons.     Comment: Auto resulted.       Urinalysis With Culture If Indicated - Urine, Clean Catch [[id number]]  (Normal) Collected: 05/14/25 2015    Specimen: Urine, Clean Catch Updated: 05/14/25 2024     Color, UA Yellow     Appearance, UA Clear     pH, UA 7.0     Specific Gravity, UA 1.011     Glucose, UA Negative     Ketones, UA Negative     Bilirubin, UA Negative     Blood, UA Negative     Protein, UA Negative     Leuk Esterase, UA Negative     Nitrite, UA Negative     Urobilinogen, UA 0.2 E.U./dL    Narrative:      In absence of clinical symptoms, the presence of pyuria, bacteria, and/or nitrites on the urinalysis result does not correlate with infection.  Urine microscopic not indicated.    CBC & Differential [[id number]]  (Abnormal) Collected: 05/14/25 2015    Specimen: Blood Updated: 05/14/25 2021    Narrative:      The following orders were created for panel order CBC & Differential.  Procedure                               Abnormality         Status                     ---------                               -----------         ------                     CBC Auto Differential[[id number]]        " Abnormal            Final result                 Please view results for these tests on the individual orders.    CBC Auto Differential [[id number]]  (Abnormal) Collected: 05/14/25 2015    Specimen: Blood Updated: 05/14/25 2021     WBC 8.08 10*3/mm3      RBC 4.33 10*6/mm3      Hemoglobin 13.1 g/dL      Hematocrit 40.1 %      MCV 92.6 fL      MCH 30.3 pg      MCHC 32.7 g/dL      RDW 12.8 %      RDW-SD 43.9 fl      MPV 8.9 fL      Platelets 256 10*3/mm3      Neutrophil % 69.0 %      Lymphocyte % 19.3 %      Monocyte % 6.7 %      Eosinophil % 3.8 %      Basophil % 1.0 %      Immature Grans % 0.2 %      Neutrophils, Absolute 5.57 10*3/mm3      Lymphocytes, Absolute 1.56 10*3/mm3      Monocytes, Absolute 0.54 10*3/mm3      Eosinophils, Absolute 0.31 10*3/mm3      Basophils, Absolute 0.08 10*3/mm3      Immature Grans, Absolute 0.02 10*3/mm3      nRBC 0.0 /100 WBC              I reviewed the patient's new clinical results.    Assessment & Plan       Chest pain    Laryngopharyngeal reflux (LPR)    Essential hypertension    Shortness of breath    Chest pain   -Stress test and echo in am   -troponin neg x 3    Shortness of breath   -D. Dimer +.  CT negative for pe.   -check BLE duplex   -1x dose of lovenox for VTE prophylaxis.   -prn duoneb    LPR   -PPI    HTN   -lisinopril on hold as creatinine slightly elevated.     -500cc bolus was given but am labs were drawn prior to bolus.  Recheck renal function at 0800, not before.   -prn hydralazine for SBP greater than 160     CODE STATUS:  Code status (Patient has no pulse and is not breathing):  CPR (Attempt to Resuscitate)  Medical Interventions (Patient has pulse or is breathing):  Full Support  Level of Support Discussed with:  Patient    Admission Status:  I believe this patient meets observation status    Expected length of stay:  1 midnights or greater    I discussed the patient's findings and my recommendations with patient.     Josy Orantes, APRN  05/15/25  01:36  EDT

## 2025-05-16 VITALS
OXYGEN SATURATION: 95 % | SYSTOLIC BLOOD PRESSURE: 149 MMHG | WEIGHT: 172 LBS | HEART RATE: 73 BPM | HEIGHT: 72 IN | BODY MASS INDEX: 23.3 KG/M2 | TEMPERATURE: 97.9 F | DIASTOLIC BLOOD PRESSURE: 81 MMHG | RESPIRATION RATE: 14 BRPM

## 2025-05-16 PROBLEM — I82.812: Status: ACTIVE | Noted: 2025-05-16

## 2025-05-16 LAB
ANION GAP SERPL CALCULATED.3IONS-SCNC: 8.8 MMOL/L (ref 5–15)
BASOPHILS # BLD AUTO: 0.01 10*3/MM3 (ref 0–0.2)
BASOPHILS NFR BLD AUTO: 0.1 % (ref 0–1.5)
BUN SERPL-MCNC: 13 MG/DL (ref 8–23)
BUN/CREAT SERPL: 11.6 (ref 7–25)
CALCIUM SPEC-SCNC: 9.2 MG/DL (ref 8.6–10.5)
CHLORIDE SERPL-SCNC: 106 MMOL/L (ref 98–107)
CO2 SERPL-SCNC: 24.2 MMOL/L (ref 22–29)
CREAT SERPL-MCNC: 1.12 MG/DL (ref 0.76–1.27)
DEPRECATED RDW RBC AUTO: 44 FL (ref 37–54)
EGFRCR SERPLBLD CKD-EPI 2021: 75.2 ML/MIN/1.73
EOSINOPHIL # BLD AUTO: 0 10*3/MM3 (ref 0–0.4)
EOSINOPHIL NFR BLD AUTO: 0 % (ref 0.3–6.2)
ERYTHROCYTE [DISTWIDTH] IN BLOOD BY AUTOMATED COUNT: 13 % (ref 12.3–15.4)
GLUCOSE SERPL-MCNC: 119 MG/DL (ref 65–99)
HCT VFR BLD AUTO: 40.8 % (ref 37.5–51)
HGB BLD-MCNC: 13.2 G/DL (ref 13–17.7)
IMM GRANULOCYTES # BLD AUTO: 0.03 10*3/MM3 (ref 0–0.05)
IMM GRANULOCYTES NFR BLD AUTO: 0.4 % (ref 0–0.5)
INR PPP: 1.04 (ref 0.9–1.1)
LYMPHOCYTES # BLD AUTO: 0.82 10*3/MM3 (ref 0.7–3.1)
LYMPHOCYTES NFR BLD AUTO: 9.8 % (ref 19.6–45.3)
MCH RBC QN AUTO: 29.9 PG (ref 26.6–33)
MCHC RBC AUTO-ENTMCNC: 32.4 G/DL (ref 31.5–35.7)
MCV RBC AUTO: 92.5 FL (ref 79–97)
MONOCYTES # BLD AUTO: 0.35 10*3/MM3 (ref 0.1–0.9)
MONOCYTES NFR BLD AUTO: 4.2 % (ref 5–12)
NEUTROPHILS NFR BLD AUTO: 7.12 10*3/MM3 (ref 1.7–7)
NEUTROPHILS NFR BLD AUTO: 85.5 % (ref 42.7–76)
NRBC BLD AUTO-RTO: 0 /100 WBC (ref 0–0.2)
PLATELET # BLD AUTO: 249 10*3/MM3 (ref 140–450)
PMV BLD AUTO: 9 FL (ref 6–12)
POTASSIUM SERPL-SCNC: 4.1 MMOL/L (ref 3.5–5.2)
PROTHROMBIN TIME: 13.5 SECONDS (ref 11.7–14.2)
RBC # BLD AUTO: 4.41 10*6/MM3 (ref 4.14–5.8)
SODIUM SERPL-SCNC: 139 MMOL/L (ref 136–145)
WBC NRBC COR # BLD AUTO: 8.33 10*3/MM3 (ref 3.4–10.8)

## 2025-05-16 PROCEDURE — 25010000002 HEPARIN (PORCINE) PER 1000 UNITS: Performed by: INTERNAL MEDICINE

## 2025-05-16 PROCEDURE — 25510000001 IOPAMIDOL PER 1 ML: Performed by: INTERNAL MEDICINE

## 2025-05-16 PROCEDURE — G0378 HOSPITAL OBSERVATION PER HR: HCPCS

## 2025-05-16 PROCEDURE — 80048 BASIC METABOLIC PNL TOTAL CA: CPT

## 2025-05-16 PROCEDURE — 85610 PROTHROMBIN TIME: CPT | Performed by: NURSE PRACTITIONER

## 2025-05-16 PROCEDURE — 93458 L HRT ARTERY/VENTRICLE ANGIO: CPT | Performed by: INTERNAL MEDICINE

## 2025-05-16 PROCEDURE — 25010000002 LIDOCAINE 1 % SOLUTION: Performed by: INTERNAL MEDICINE

## 2025-05-16 PROCEDURE — C1894 INTRO/SHEATH, NON-LASER: HCPCS | Performed by: INTERNAL MEDICINE

## 2025-05-16 PROCEDURE — 99152 MOD SED SAME PHYS/QHP 5/>YRS: CPT | Performed by: INTERNAL MEDICINE

## 2025-05-16 PROCEDURE — 25810000003 SODIUM CHLORIDE 0.9 % SOLUTION: Performed by: NURSE PRACTITIONER

## 2025-05-16 PROCEDURE — 25010000002 MIDAZOLAM PER 1 MG: Performed by: INTERNAL MEDICINE

## 2025-05-16 PROCEDURE — C1769 GUIDE WIRE: HCPCS | Performed by: INTERNAL MEDICINE

## 2025-05-16 PROCEDURE — 85025 COMPLETE CBC W/AUTO DIFF WBC: CPT

## 2025-05-16 PROCEDURE — 25010000002 NITROGLYCERIN 5 MG/ML SOLUTION: Performed by: INTERNAL MEDICINE

## 2025-05-16 PROCEDURE — 25010000002 FENTANYL CITRATE (PF) 100 MCG/2ML SOLUTION: Performed by: INTERNAL MEDICINE

## 2025-05-16 PROCEDURE — 25010000002 NICARDIPINE 2.5 MG/ML SOLUTION: Performed by: INTERNAL MEDICINE

## 2025-05-16 RX ORDER — NICARDIPINE HYDROCHLORIDE 2.5 MG/ML
INJECTION INTRAVENOUS
Status: DISCONTINUED | OUTPATIENT
Start: 2025-05-16 | End: 2025-05-16 | Stop reason: HOSPADM

## 2025-05-16 RX ORDER — MIDAZOLAM HYDROCHLORIDE 1 MG/ML
INJECTION, SOLUTION INTRAMUSCULAR; INTRAVENOUS
Status: DISCONTINUED | OUTPATIENT
Start: 2025-05-16 | End: 2025-05-16 | Stop reason: HOSPADM

## 2025-05-16 RX ORDER — HEPARIN SODIUM 1000 [USP'U]/ML
INJECTION, SOLUTION INTRAVENOUS; SUBCUTANEOUS
Status: DISCONTINUED | OUTPATIENT
Start: 2025-05-16 | End: 2025-05-16 | Stop reason: HOSPADM

## 2025-05-16 RX ORDER — ONDANSETRON 4 MG/1
4 TABLET, ORALLY DISINTEGRATING ORAL EVERY 6 HOURS PRN
Status: DISCONTINUED | OUTPATIENT
Start: 2025-05-16 | End: 2025-05-16 | Stop reason: HOSPADM

## 2025-05-16 RX ORDER — ALUMINA, MAGNESIA, AND SIMETHICONE 2400; 2400; 240 MG/30ML; MG/30ML; MG/30ML
15 SUSPENSION ORAL EVERY 6 HOURS PRN
Status: DISCONTINUED | OUTPATIENT
Start: 2025-05-16 | End: 2025-05-16 | Stop reason: HOSPADM

## 2025-05-16 RX ORDER — NITROGLYCERIN 0.4 MG/1
0.4 TABLET SUBLINGUAL
Status: DISCONTINUED | OUTPATIENT
Start: 2025-05-16 | End: 2025-05-16 | Stop reason: HOSPADM

## 2025-05-16 RX ORDER — SODIUM CHLORIDE 9 MG/ML
30 INJECTION, SOLUTION INTRAVENOUS CONTINUOUS
Status: DISCONTINUED | OUTPATIENT
Start: 2025-05-16 | End: 2025-05-16 | Stop reason: HOSPADM

## 2025-05-16 RX ORDER — LIDOCAINE HYDROCHLORIDE 20 MG/ML
SOLUTION OROPHARYNGEAL
Qty: 100 ML | Refills: 0 | Status: SHIPPED | OUTPATIENT
Start: 2025-05-16 | End: 2025-05-20 | Stop reason: HOSPADM

## 2025-05-16 RX ORDER — IOPAMIDOL 755 MG/ML
INJECTION, SOLUTION INTRAVASCULAR
Status: DISCONTINUED | OUTPATIENT
Start: 2025-05-16 | End: 2025-05-16 | Stop reason: HOSPADM

## 2025-05-16 RX ORDER — FENTANYL CITRATE 50 UG/ML
INJECTION, SOLUTION INTRAMUSCULAR; INTRAVENOUS
Status: DISCONTINUED | OUTPATIENT
Start: 2025-05-16 | End: 2025-05-16 | Stop reason: HOSPADM

## 2025-05-16 RX ORDER — DIPHENHYDRAMINE HCL 25 MG
25 CAPSULE ORAL EVERY 6 HOURS PRN
Status: DISCONTINUED | OUTPATIENT
Start: 2025-05-16 | End: 2025-05-16 | Stop reason: HOSPADM

## 2025-05-16 RX ORDER — ACETAMINOPHEN 325 MG/1
650 TABLET ORAL EVERY 4 HOURS PRN
Status: DISCONTINUED | OUTPATIENT
Start: 2025-05-16 | End: 2025-05-16 | Stop reason: HOSPADM

## 2025-05-16 RX ORDER — ONDANSETRON 2 MG/ML
4 INJECTION INTRAMUSCULAR; INTRAVENOUS EVERY 6 HOURS PRN
Status: DISCONTINUED | OUTPATIENT
Start: 2025-05-16 | End: 2025-05-16 | Stop reason: HOSPADM

## 2025-05-16 RX ORDER — LIDOCAINE HYDROCHLORIDE 10 MG/ML
INJECTION, SOLUTION INFILTRATION; PERINEURAL
Status: DISCONTINUED | OUTPATIENT
Start: 2025-05-16 | End: 2025-05-16 | Stop reason: HOSPADM

## 2025-05-16 RX ORDER — NITROGLYCERIN 5 MG/ML
INJECTION, SOLUTION INTRAVENOUS
Status: DISCONTINUED | OUTPATIENT
Start: 2025-05-16 | End: 2025-05-16 | Stop reason: HOSPADM

## 2025-05-16 RX ORDER — NICOTINE 21 MG/24HR
1 PATCH, TRANSDERMAL 24 HOURS TRANSDERMAL DAILY PRN
Status: DISCONTINUED | OUTPATIENT
Start: 2025-05-16 | End: 2025-05-16 | Stop reason: HOSPADM

## 2025-05-16 RX ADMIN — SODIUM CHLORIDE 75 ML/HR: 9 INJECTION, SOLUTION INTRAVENOUS at 00:08

## 2025-05-16 NOTE — PLAN OF CARE
Goal Outcome Evaluation:  Plan of Care Reviewed With: patient        Progress: no change     Heart cath with Dereje later today

## 2025-05-16 NOTE — DISCHARGE SUMMARY
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       Presenting Problem/History of Present Illness  Active Hospital Problems    Diagnosis  POA    **Chest pain [R07.9]  Yes    Greater saphenous vein embolism, left [I82.812]  Unknown    Laryngopharyngeal reflux (LPR) [K21.9]  Yes    Essential hypertension [I10]  Yes    Shortness of breath [R06.02]  Yes      Resolved Hospital Problems   No resolved problems to display.          Hospital Course  Pleasant 59 y.o. male with history of HTN  and reflux presented with chest pain. He had been working on his truck when he noted fatigue, left arm tingling with midsternal chest pressure and shallow breathing. He took his BP at home and it was 160's/100. Gastroenterologist suggested a cardiac workup and has an upcoming appointment with [facility] cardiology to establish care. He has recently started back on PPI after trialing off of it.  In the ER his d-dimer was noted to be elevated. CT negative for PE. 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 Cardiac catheterization showed no significant occlusive disease and EF of 60-65%. He was advised to continue PPI therapy with home GI cocktail as needed BID for up to 1 week. He was also started on Xarelto for the great saphenous vein thrombosis. He will need re-imaging in 3 months. Discussed compliance and reduction of high risk activities to reduce risks of bleeding. He is to follow up with PCP in 1-2  weeks.    Procedures Performed    Procedure(s):  Left Heart Cath radial  -------------------       Consults:   Consults       Date and Time Order Name Status Description    5/15/2025  9:11 AM Inpatient Cardiology Consult Completed             Pertinent Test Results:    Lab Results (most recent)       Procedure Component Value Units Date/Time    Basic Metabolic Panel [[id number]]  (Abnormal) Collected: 05/16/25 0426    Specimen: Blood Updated: 05/16/25 0525     Glucose 119 mg/dL      BUN 13 mg/dL      Creatinine 1.12 mg/dL      Sodium 139 mmol/L      Potassium 4.1 mmol/L      Chloride 106 mmol/L      CO2 24.2 mmol/L      Calcium 9.2 mg/dL      BUN/Creatinine Ratio 11.6     Anion Gap 8.8 mmol/L      eGFR 75.2 mL/min/1.73     Narrative:      GFR Categories in Chronic Kidney Disease (CKD)              GFR Category          GFR (mL/min/1.73)    Interpretation  G1                    90 or greater        Normal or high (1)  G2                    60-89                Mild decrease (1)  G3a                   45-59                Mild to moderate decrease  G3b                   30-44                Moderate to severe decrease  G4                    15-29                Severe decrease  G5                    14 or less           Kidney failure    (1)In the absence of evidence of kidney disease, neither GFR category G1 or G2 fulfill the criteria for CKD.    eGFR calculation 2021 CKD-EPI creatinine equation, which does not include race as a factor    Protime-INR [[id number]]  (Normal) Collected: 05/16/25 0426    Specimen: Blood Updated: 05/16/25 0502     Protime 13.5 Seconds      INR 1.04    CBC & Differential [[id number]]  (Abnormal) Collected: 05/16/25 0426    Specimen: Blood Updated: 05/16/25 0455    Narrative:      The following orders were created for panel order CBC & Differential.  Procedure                               Abnormality         Status                     ---------                               -----------          ------                     CBC Auto Differential[[id number]]        Abnormal            Final result                 Please view results for these tests on the individual orders.    CBC Auto Differential [[id number]]  (Abnormal) Collected: 05/16/25 0426    Specimen: Blood Updated: 05/16/25 0455     WBC 8.33 10*3/mm3      RBC 4.41 10*6/mm3      Hemoglobin 13.2 g/dL      Hematocrit 40.8 %      MCV 92.5 fL      MCH 29.9 pg      MCHC 32.4 g/dL      RDW 13.0 %      RDW-SD 44.0 fl      MPV 9.0 fL      Platelets 249 10*3/mm3      Neutrophil % 85.5 %      Lymphocyte % 9.8 %      Monocyte % 4.2 %      Eosinophil % 0.0 %      Basophil % 0.1 %      Immature Grans % 0.4 %      Neutrophils, Absolute 7.12 10*3/mm3      Lymphocytes, Absolute 0.82 10*3/mm3      Monocytes, Absolute 0.35 10*3/mm3      Eosinophils, Absolute 0.00 10*3/mm3      Basophils, Absolute 0.01 10*3/mm3      Immature Grans, Absolute 0.03 10*3/mm3      nRBC 0.0 /100 WBC     Renal Function Panel [[id number]]  (Normal) Collected: 05/15/25 1212    Specimen: Blood from Arm, Left Updated: 05/15/25 1251     Glucose 94 mg/dL      BUN 12 mg/dL      Creatinine 1.03 mg/dL      Sodium 140 mmol/L      Potassium 3.9 mmol/L      Comment: Specimen hemolyzed.  Result may be falsely elevated.        Chloride 107 mmol/L      CO2 22.3 mmol/L      Calcium 9.0 mg/dL      Albumin 4.3 g/dL      Phosphorus 2.9 mg/dL      Anion Gap 10.7 mmol/L      BUN/Creatinine Ratio 11.7     eGFR 83.2 mL/min/1.73     Narrative:      GFR Categories in Chronic Kidney Disease (CKD)              GFR Category          GFR (mL/min/1.73)    Interpretation  G1                    90 or greater        Normal or high (1)  G2                    60-89                Mild decrease (1)  G3a                   45-59                Mild to moderate decrease  G3b                   30-44                Moderate to severe decrease  G4                    15-29                Severe decrease  G5                    14 or less            Kidney failure    (1)In the absence of evidence of kidney disease, neither GFR category G1 or G2 fulfill the criteria for CKD.    eGFR calculation 2021 CKD-EPI creatinine equation, which does not include race as a factor    Basic Metabolic Panel [[id number]]  (Abnormal) Collected: 05/15/25 0041    Specimen: Blood from Arm, Right Updated: 05/15/25 0123     Glucose 105 mg/dL      BUN 13 mg/dL      Creatinine 1.34 mg/dL      Sodium 141 mmol/L      Potassium 4.3 mmol/L      Chloride 104 mmol/L      CO2 26.3 mmol/L      Calcium 8.9 mg/dL      BUN/Creatinine Ratio 9.7     Anion Gap 10.7 mmol/L      eGFR 60.6 mL/min/1.73     Narrative:      GFR Categories in Chronic Kidney Disease (CKD)              GFR Category          GFR (mL/min/1.73)    Interpretation  G1                    90 or greater        Normal or high (1)  G2                    60-89                Mild decrease (1)  G3a                   45-59                Mild to moderate decrease  G3b                   30-44                Moderate to severe decrease  G4                    15-29                Severe decrease  G5                    14 or less           Kidney failure    (1)In the absence of evidence of kidney disease, neither GFR category G1 or G2 fulfill the criteria for CKD.    eGFR calculation 2021 CKD-EPI creatinine equation, which does not include race as a factor    High Sensitivity Troponin T [[id number]]  (Normal) Collected: 05/15/25 0041    Specimen: Blood from Arm, Right Updated: 05/15/25 0123     HS Troponin T 8 ng/L     Narrative:      High Sensitive Troponin T Reference Range:  <14.0 ng/L- Negative Female for AMI  <22.0 ng/L- Negative Male for AMI  >=14 - Abnormal Female indicating possible myocardial injury.  >=22 - Abnormal Male indicating possible myocardial injury.   Clinicians would have to utilize clinical acumen, EKG, Troponin, and serial changes to determine if it is an Acute Myocardial Infarction or myocardial injury due to an  underlying chronic condition.         Lipid Panel [[id number]]  (Abnormal) Collected: 05/15/25 0041    Specimen: Blood from Arm, Right Updated: 05/15/25 0123     Total Cholesterol 222 mg/dL      Triglycerides 161 mg/dL      HDL Cholesterol 56 mg/dL      LDL Cholesterol  137 mg/dL      VLDL Cholesterol 29 mg/dL      LDL/HDL Ratio 2.39    Narrative:      Cholesterol Reference Ranges  (U.S. Department of Health and Human Services ATP III Classifications)    Desirable          <200 mg/dL  Borderline High    200-239 mg/dL  High Risk          >240 mg/dL      Triglyceride Reference Ranges  (U.S. Department of Health and Human Services ATP III Classifications)    Normal           <150 mg/dL  Borderline High  150-199 mg/dL  High             200-499 mg/dL  Very High        >500 mg/dL    HDL Reference Ranges  (U.S. Department of Health and Human Services ATP III Classifications)    Low     <40 mg/dl (major risk factor for CHD)  High    >60 mg/dl ('negative' risk factor for CHD)        LDL Reference Ranges  (U.S. Department of Health and Human Services ATP III Classifications)    Optimal          <100 mg/dL  Near Optimal     100-129 mg/dL  Borderline High  130-159 mg/dL  High             160-189 mg/dL  Very High        >189 mg/dL    LDL is calculated using the NIH LDL-C calculation.      CBC (No Diff) [[id number]]  (Abnormal) Collected: 05/15/25 0041    Specimen: Blood from Arm, Right Updated: 05/15/25 0103     WBC 6.84 10*3/mm3      RBC 3.79 10*6/mm3      Hemoglobin 11.5 g/dL      Hematocrit 35.5 %      MCV 93.7 fL      MCH 30.3 pg      MCHC 32.4 g/dL      RDW 13.0 %      RDW-SD 44.5 fl      MPV 9.2 fL      Platelets 212 10*3/mm3     High Sensitivity Troponin T 1Hr [[id number]]  (Normal) Collected: 05/14/25 2120    Specimen: Blood Updated: 05/14/25 2142     HS Troponin T 7 ng/L      Troponin T Numeric Delta 1 ng/L     Narrative:      High Sensitive Troponin T Reference Range:  <14.0 ng/L- Negative Female for AMI  <22.0 ng/L-  Negative Male for AMI  >=14 - Abnormal Female indicating possible myocardial injury.  >=22 - Abnormal Male indicating possible myocardial injury.   Clinicians would have to utilize clinical acumen, EKG, Troponin, and serial changes to determine if it is an Acute Myocardial Infarction or myocardial injury due to an underlying chronic condition.         Respiratory Panel PCR w/COVID-19(SARS-CoV-2) MANN/MIKAYLA/SELVIN/PAD/COR/VICKY In-House, NP Swab in UTM/VTM, 2 HR TAT - Swab, Nasopharynx [[id number]]  (Normal) Collected: 05/14/25 2015    Specimen: Swab from Nasopharynx Updated: 05/14/25 2111     ADENOVIRUS, PCR Not Detected     Coronavirus 229E Not Detected     Coronavirus HKU1 Not Detected     Coronavirus NL63 Not Detected     Coronavirus OC43 Not Detected     COVID19 Not Detected     Human Metapneumovirus Not Detected     Human Rhinovirus/Enterovirus Not Detected     Influenza A PCR Not Detected     Influenza B PCR Not Detected     Parainfluenza Virus 1 Not Detected     Parainfluenza Virus 2 Not Detected     Parainfluenza Virus 3 Not Detected     Parainfluenza Virus 4 Not Detected     RSV, PCR Not Detected     Bordetella pertussis pcr Not Detected     Bordetella parapertussis PCR Not Detected     Chlamydophila pneumoniae PCR Not Detected     Mycoplasma pneumo by PCR Not Detected    Narrative:      In the setting of a positive respiratory panel with a viral infection PLUS a negative procalcitonin without other underlying concern for bacterial infection, consider observing off antibiotics or discontinuation of antibiotics and continue supportive care. If the respiratory panel is positive for atypical bacterial infection (Bordetella pertussis, Chlamydophila pneumoniae, or Mycoplasma pneumoniae), consider antibiotic de-escalation to target atypical bacterial infection.    Comprehensive Metabolic Panel [[id number]]  (Abnormal) Collected: 05/14/25 2015    Specimen: Blood Updated: 05/14/25 2053     Glucose 102 mg/dL      BUN 14 mg/dL       Creatinine 1.32 mg/dL      Sodium 140 mmol/L      Potassium 4.7 mmol/L      Chloride 104 mmol/L      CO2 27.7 mmol/L      Calcium 9.5 mg/dL      Total Protein 7.1 g/dL      Albumin 4.6 g/dL      ALT (SGPT) 17 U/L      AST (SGOT) 23 U/L      Alkaline Phosphatase 95 U/L      Total Bilirubin 0.4 mg/dL      Globulin 2.5 gm/dL      A/G Ratio 1.8 g/dL      BUN/Creatinine Ratio 10.6     Anion Gap 8.3 mmol/L      eGFR 61.7 mL/min/1.73     Narrative:      GFR Categories in Chronic Kidney Disease (CKD)              GFR Category          GFR (mL/min/1.73)    Interpretation  G1                    90 or greater        Normal or high (1)  G2                    60-89                Mild decrease (1)  G3a                   45-59                Mild to moderate decrease  G3b                   30-44                Moderate to severe decrease  G4                    15-29                Severe decrease  G5                    14 or less           Kidney failure    (1)In the absence of evidence of kidney disease, neither GFR category G1 or G2 fulfill the criteria for CKD.    eGFR calculation 2021 CKD-EPI creatinine equation, which does not include race as a factor    Lipase [[id number]]  (Normal) Collected: 05/14/25 2015    Specimen: Blood Updated: 05/14/25 2053     Lipase 32 U/L     High Sensitivity Troponin T [[id number]]  (Normal) Collected: 05/14/25 2015    Specimen: Blood Updated: 05/14/25 2053     HS Troponin T 6 ng/L     Narrative:      High Sensitive Troponin T Reference Range:  <14.0 ng/L- Negative Female for AMI  <22.0 ng/L- Negative Male for AMI  >=14 - Abnormal Female indicating possible myocardial injury.  >=22 - Abnormal Male indicating possible myocardial injury.   Clinicians would have to utilize clinical acumen, EKG, Troponin, and serial changes to determine if it is an Acute Myocardial Infarction or myocardial injury due to an underlying chronic condition.         Magnesium [[id number]]  (Normal) Collected: 05/14/25  "2015    Specimen: Blood Updated: 05/14/25 2053     Magnesium 2.2 mg/dL     TSH Rfx On Abnormal To Free T4 [[id number]]  (Normal) Collected: 05/14/25 2015    Specimen: Blood Updated: 05/14/25 2053     TSH 2.490 uIU/mL     D-dimer, Quantitative [[id number]]  (Abnormal) Collected: 05/14/25 2015    Specimen: Blood Updated: 05/14/25 2034     D-Dimer, Quantitative 1.38 MCGFEU/mL     Narrative:      According to the assay 's published package insert, a normal (<0.50 MCGFEU/mL) D-dimer result in conjunction with a non-high clinical probability assessment, excludes deep vein thrombosis (DVT) and pulmonary embolism (PE) with high sensitivity.    D-dimer values increase with age and this can make VTE exclusion of an older population difficult. To address this, the [employer], based on best available evidence and recent guidelines, recommends that clinicians use age-adjusted D-dimer thresholds in patients greater than 50 years of age with: a) a low probability of PE who do not meet all Pulmonary Embolism Rule Out Criteria, or b) in those with intermediate probability of PE.   The formula for an age-adjusted D-dimer cut-off is \"age/100\".  For example, a 60 year old patient would have an age-adjusted cut-off of 0.60 MCGFEU/mL and an 80 year old 0.80 MCGFEU/mL.    Extra Tubes [[id number]] Collected: 05/14/25 2015    Specimen: Blood, Venous Line Updated: 05/14/25 2030    Narrative:      The following orders were created for panel order Extra Tubes.  Procedure                               Abnormality         Status                     ---------                               -----------         ------                     Gold Top - SST[[id number]]                                   Final result                 Please view results for these tests on the individual orders.    Gold Top - SST [[id number]] Collected: 05/14/25 2015    Specimen: Blood Updated: 05/14/25 2030     Extra Tube Hold for add-ons.     " Comment: Auto resulted.       Urinalysis With Culture If Indicated - Urine, Clean Catch [[id number]]  (Normal) Collected: 05/14/25 2015    Specimen: Urine, Clean Catch Updated: 05/14/25 2024     Color, UA Yellow     Appearance, UA Clear     pH, UA 7.0     Specific Gravity, UA 1.011     Glucose, UA Negative     Ketones, UA Negative     Bilirubin, UA Negative     Blood, UA Negative     Protein, UA Negative     Leuk Esterase, UA Negative     Nitrite, UA Negative     Urobilinogen, UA 0.2 E.U./dL    Narrative:      In absence of clinical symptoms, the presence of pyuria, bacteria, and/or nitrites on the urinalysis result does not correlate with infection.  Urine microscopic not indicated.    CBC & Differential [[id number]]  (Abnormal) Collected: 05/14/25 2015    Specimen: Blood Updated: 05/14/25 2021    Narrative:      The following orders were created for panel order CBC & Differential.  Procedure                               Abnormality         Status                     ---------                               -----------         ------                     CBC Auto Differential[[id number]]        Abnormal            Final result                 Please view results for these tests on the individual orders.    CBC Auto Differential [[id number]]  (Abnormal) Collected: 05/14/25 2015    Specimen: Blood Updated: 05/14/25 2021     WBC 8.08 10*3/mm3      RBC 4.33 10*6/mm3      Hemoglobin 13.1 g/dL      Hematocrit 40.1 %      MCV 92.6 fL      MCH 30.3 pg      MCHC 32.7 g/dL      RDW 12.8 %      RDW-SD 43.9 fl      MPV 8.9 fL      Platelets 256 10*3/mm3      Neutrophil % 69.0 %      Lymphocyte % 19.3 %      Monocyte % 6.7 %      Eosinophil % 3.8 %      Basophil % 1.0 %      Immature Grans % 0.2 %      Neutrophils, Absolute 5.57 10*3/mm3      Lymphocytes, Absolute 1.56 10*3/mm3      Monocytes, Absolute 0.54 10*3/mm3      Eosinophils, Absolute 0.31 10*3/mm3      Basophils, Absolute 0.08 10*3/mm3      Immature Grans, Absolute 0.02  10*3/mm3      nRBC 0.0 /100 WBC              Results for orders placed during the hospital encounter of 05/14/25    Adult Transthoracic Echo Complete w/ Color, Spectral and Contrast if necessary per protocol    Interpretation Summary    Left ventricular systolic function is normal. Calculated left ventricular EF = 55.3%    Left ventricular diastolic function was normal.    Estimated right ventricular systolic pressure from tricuspid regurgitation is normal (<35 mmHg).              Condition on Discharge:  Stable    Vital Signs  Temp:  [97.7 °F (36.5 °C)-97.9 °F (36.6 °C)] 97.9 °F (36.6 °C)  Heart Rate:  [50-82] 79  Resp:  [12-24] 14  BP: (118-184)/(70-97) 147/87    Physical Exam:     General Appearance:    Alert, cooperative, in no acute distress   Head:    Normocephalic, without obvious abnormality, atraumatic   Eyes:            Lids and lashes normal, conjunctivae and sclerae normal, no   icterus, no pallor, corneas clear   Ears:    Ears appear intact with no abnormalities noted   Throat:   No oral lesions, no thrush, oral mucosa moist   Neck:   No adenopathy, supple, trachea midline, no thyromegaly, no   carotid bruit, no JVD   Lungs:     Clear to auscultation,respirations regular, even and unlabored    Heart:    Regular rhythm and normal rate, normal S1 and S2, no  murmur, no gallop, no rub, no click   Chest Wall:    No abnormalities observed   Abdomen:     Normal bowel sounds, no masses, no organomegaly, soft  non-tender, non-distended, no guarding, no rebound                tenderness   Extremities:   Moves all extremities well, no edema, no cyanosis, no  redness   Pulses:   Pulses palpable and equal bilaterally   Skin:   No bleeding, bruising or rash   Lymph nodes:   No palpable adenopathy           Discharge Disposition  Home or Self Care    Discharge Medications     Discharge Medications        New Medications        Instructions Start Date   lidocaine 2 % jelly  Commonly known as: XYLOCAINE   5cc with  Maalox twice daily for up to 1 week for GERD symptoms      Rivaroxaban tablet therapy pack starter pack  Commonly known as: XARELTO   Take as directed             Continue These Medications        Instructions Start Date   lisinopril 10 MG tablet  Commonly known as: PRINIVIL,ZESTRIL   1 tablet, Daily      omeprazole 20 MG capsule  Commonly known as: priLOSEC   20 mg, Oral, Daily               Discharge Diet:   Diet Instructions       Diet: Cardiac Diets; Healthy Heart (2-3 Na+); Regular (IDDSI 7); Thin (IDDSI 0)      Discharge Diet: Cardiac Diets    Cardiac Diet: Healthy Heart (2-3 Na+)    Texture: Regular (IDDSI 7)    Fluid Consistency: Thin (IDDSI 0)            Activity at Discharge:   Activity Instructions       Activity as Tolerated              Follow-up Appointments  No future appointments.  Additional Instructions for the Follow-ups that You Need to Schedule       Discharge Follow-up with PCP   As directed       Currently Documented PCP:    Aristeo Rodriguez MD    PCP Phone Number:    [phone]     Follow Up Details: 1-2 weeks                Test Results Pending at Discharge  Pending Results       None             Enriqueta Burciaga PA-C  05/16/25  16:47 EDT    Time: Discharge 25 min

## 2025-05-16 NOTE — NURSING NOTE
I messaged Dr. Kang for 0.5mg of Ativan for the patient because he usually takes that at night and she agreed to it per secure chat.

## 2025-05-16 NOTE — PLAN OF CARE
Goal Outcome Evaluation:  Plan of Care Reviewed With: patient        Progress: improving  Outcome Evaluation: Patient is being evaluated for chest pain. Echo and Myoview done yesterday, both were negative. Patient had another episode of atypical chest pain so it was decided on a heart cath. He has been NPO since midnight. Heart Cath is scheduled for 12pm today. Consent obtained. IVF started at 75cc/hr per MD order. Patient is hopeful to d/c later this evening if heart cath is negative.

## 2025-05-16 NOTE — DISCHARGE INSTRUCTIONS
Post Cath Instructions  Drink plenty of water for the next 24 hours. This helps to eliminate the dye used in your procedure through urination.  You may resume a normal diet; however, try to avoid foods that would cause gas or constipation.     What to do for pain: acetaminophen (Tylenol), not ibuprofen (Advil) can be used for puncture site tenderness. If your pain is unmanageable with this method, call the Cardiologist's office.     Sedative medication (Anesthesia) given to you during your catheterization may decrease your judgement and reaction time for up to 24-48 hours.  Therefore:  DO NOT drive, operate hazardous machinery, or consume alcoholic beverages for 24 hours.   DO NOT make any important/legal decisions for 24 hours.   Have someone stay with you for at least 24 hours.    For the next 48 hours (to allow proper healing and prevent bleeding):  Avoid excessive bending at wound site  Avoid straining (anything that would tense up muscles around the affected puncture site)  Avoid lifting, pushing, or pulling objects greater than 5 pounds, for 5 days  For Arm Cases:  No flexing at the puncture site, such as hammering, golfing, bowling, or swinging any objects    Keep the puncture site clean and dry.  After 24 hours, remove the dressing and replace it with a Band-Aid for at least one additional day.  Gently clean the site with mild soap and water.  No scrubbing/rubbing, and lightly pat the area dry.  Showers are acceptable; however, avoid submerging in water (tub baths, hot tubs, pools, dishwater, etc…) for at least one week.  The site should be completely healed before resuming these activities to reduce the risk of infection. Watch for signs and symptoms of infection and notify the physician of any of the following:  Bleeding or an increase in swelling, redness, or warmth at the puncture site  Fever  Increased soreness around puncture site  Foul odor or significant drainage from the puncture site  **A bruise or  small “pea sized” lump under the skin at the puncture site is not unusual.  This should disappear within 3-4 weeks.**    CONTACT YOUR PHYSICIAN OR CALL 911 IF YOU EXPERIENCE ANY OF THE FOLLOWING:  Increased angina (chest pain) or frequent sensations of pressure, burning, pain, or other discomfort in the chest, arm, jaws, or stomach  Lightheadedness, dizziness, faint feeling, sweating, or difficulty breathing  Odd changes in sedation (like numbness, tingling, coldness, or pain) or color (pale/bluish) in the arm or leg in which the catheter was inserted.    IMPORTANT:  Although this occurs very rarely, if you should develop bright red or excessive bleeding, feel a “pop” inside at the insertion site, or notice a sudden increase in swelling larger than a walnut, you should call 911.  Hold continuous firm pressure to the access site until emergency personnel arrive.  It is best if someone else can do this for you.

## 2025-05-16 NOTE — PROGRESS NOTES
LOS: 1 day   Patient Care Team:  Aristeo Rodriguez MD as PCP - General (Family Medicine)  Aristeo Rodriguez MD as PCP - Family Medicine  Aristeo Rodriguez MD    Subjective     Interval History: Cardiac Catheterization today    Patient Complaints: No voiced complaints    History taken from: patient    Review of Systems   Constitutional:  Positive for activity change. Negative for fatigue.   HENT:  Negative for trouble swallowing.    Eyes:  Negative for visual disturbance.   Respiratory:  Negative for cough, shortness of breath and wheezing.    Cardiovascular:  Negative for chest pain, palpitations and leg swelling.   Gastrointestinal:  Positive for abdominal pain (Reflux). Negative for nausea.   Endocrine: Negative for polyuria.   Genitourinary:  Negative for difficulty urinating.   Musculoskeletal:  Negative for gait problem.   Skin:  Negative for rash.   Neurological:  Negative for weakness.   Psychiatric/Behavioral:  Negative for confusion.            Objective     Vital Signs  Temp:  [97.7 °F (36.5 °C)-97.9 °F (36.6 °C)] 97.9 °F (36.6 °C)  Heart Rate:  [66-82] 67  Resp:  [12-24] 14  BP: (118-135)/(70-76) 135/76    Physical Exam:     General Appearance:    Alert, cooperative, in no acute distress,   Head:    Normocephalic, without obvious abnormality, atraumatic   Eyes:            Lids and lashes normal, conjunctivae and sclerae normal, no   icterus, no pallor, corneas clear   Ears:    Ears appear intact with no abnormalities noted   Throat:   No oral lesions, no thrush, oral mucosa moist   Neck:   No adenopathy, supple, trachea midline, no thyromegaly, no   carotid bruit, no JVD   Lungs:     Clear to auscultation,respirations regular, even and unlabored    Heart:    Regular rhythm and normal rate, normal S1 and S2, no  murmur, no gallop, no rub, no click   Chest Wall:    No abnormalities observed   Abdomen:     Normal bowel sounds, no masses, no organomegaly, soft Non-tender non-distended, no guarding,   Extremities:    Moves all extremities well, no edema, no cyanosis, no Redness   Pulses:   Pulses palpable and equal bilaterally   Skin:   No bleeding, bruising or rash   Lymph nodes:   No palpable adenopathy            Results Review:    Lab Results (last 24 hours)       Procedure Component Value Units Date/Time    Basic Metabolic Panel [[id number]]  (Abnormal) Collected: 05/16/25 0426    Specimen: Blood Updated: 05/16/25 0525     Glucose 119 mg/dL      BUN 13 mg/dL      Creatinine 1.12 mg/dL      Sodium 139 mmol/L      Potassium 4.1 mmol/L      Chloride 106 mmol/L      CO2 24.2 mmol/L      Calcium 9.2 mg/dL      BUN/Creatinine Ratio 11.6     Anion Gap 8.8 mmol/L      eGFR 75.2 mL/min/1.73     Narrative:      GFR Categories in Chronic Kidney Disease (CKD)              GFR Category          GFR (mL/min/1.73)    Interpretation  G1                    90 or greater        Normal or high (1)  G2                    60-89                Mild decrease (1)  G3a                   45-59                Mild to moderate decrease  G3b                   30-44                Moderate to severe decrease  G4                    15-29                Severe decrease  G5                    14 or less           Kidney failure    (1)In the absence of evidence of kidney disease, neither GFR category G1 or G2 fulfill the criteria for CKD.    eGFR calculation 2021 CKD-EPI creatinine equation, which does not include race as a factor    Protime-INR [[id number]]  (Normal) Collected: 05/16/25 0426    Specimen: Blood Updated: 05/16/25 0502     Protime 13.5 Seconds      INR 1.04    CBC & Differential [[id number]]  (Abnormal) Collected: 05/16/25 0426    Specimen: Blood Updated: 05/16/25 0455    Narrative:      The following orders were created for panel order CBC & Differential.  Procedure                               Abnormality         Status                     ---------                               -----------         ------                     CBC Auto  Differential[[id number]]        Abnormal            Final result                 Please view results for these tests on the individual orders.    CBC Auto Differential [[id number]]  (Abnormal) Collected: 05/16/25 0426    Specimen: Blood Updated: 05/16/25 0455     WBC 8.33 10*3/mm3      RBC 4.41 10*6/mm3      Hemoglobin 13.2 g/dL      Hematocrit 40.8 %      MCV 92.5 fL      MCH 29.9 pg      MCHC 32.4 g/dL      RDW 13.0 %      RDW-SD 44.0 fl      MPV 9.0 fL      Platelets 249 10*3/mm3      Neutrophil % 85.5 %      Lymphocyte % 9.8 %      Monocyte % 4.2 %      Eosinophil % 0.0 %      Basophil % 0.1 %      Immature Grans % 0.4 %      Neutrophils, Absolute 7.12 10*3/mm3      Lymphocytes, Absolute 0.82 10*3/mm3      Monocytes, Absolute 0.35 10*3/mm3      Eosinophils, Absolute 0.00 10*3/mm3      Basophils, Absolute 0.01 10*3/mm3      Immature Grans, Absolute 0.03 10*3/mm3      nRBC 0.0 /100 WBC              Imaging Results (Last 24 Hours)       ** No results found for the last 24 hours. **                 I reviewed the patient's new clinical results.    Medication Review:   Scheduled Meds:lisinopril, 10 mg, Oral, Daily  pantoprazole, 40 mg, Oral, BID AC  sodium chloride, 10 mL, Intravenous, Q12H      Continuous Infusions:sodium chloride, 75 mL/hr, Last Rate: 75 mL/hr (05/16/25 0434)      PRN Meds:.  acetaminophen    aluminum-magnesium hydroxide-simethicone    senna-docusate sodium **AND** polyethylene glycol **AND** bisacodyl **AND** bisacodyl    hydrALAZINE    ipratropium-albuterol    LORazepam    nitroglycerin    ondansetron ODT **OR** ondansetron    sodium chloride    sodium chloride     Assessment & Plan       Chest pain    Laryngopharyngeal reflux (LPR)    Essential hypertension    Shortness of breath      Chest pain  -Stress test and echo are neg   -troponin neg x 3  - cardiology consult   - cardiac catherization today  - PPI BID  - solumedrol IV x1 dose  - GI cocktail     Shortness of breath   -D. Dimer +.  CT  negative for pe.    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 Consider starting Xarelto at discharge with reimaging in 3 months.   -prn duoneb     LPR              -PPI BID     HTN              -lisinopril                - prn hydralazine for SBP greater than 160     Elevated creatine               -500cc bolus was given but am labs were drawn prior to bolus.               - resolved this am     Anxiety              - ativan 0.5mg QHS prn (home med)    CODE Status:    Code status (Patient has no pulse and is not breathing):  CPR (Attempt to Resuscitate)  Medical Interventions (Patient has pulse or is breathing):  Full support  Level of support discussed with:  Patient    Admission status:  I believe this patient meets inpatient status  Expected length of stay:  2 midnights or greater  I discussed the patient's findings and my recommendations with the patient.    Plan for disposition:home    Enriqueta Burciaga PA-C  05/16/25  13:33 EDT

## 2025-05-19 ENCOUNTER — APPOINTMENT (OUTPATIENT)
Dept: CT IMAGING | Facility: HOSPITAL | Age: 60
End: 2025-05-19
Payer: COMMERCIAL

## 2025-05-19 ENCOUNTER — HOSPITAL ENCOUNTER (OUTPATIENT)
Facility: HOSPITAL | Age: 60
Setting detail: OBSERVATION
Discharge: HOME | End: 2025-05-20
Attending: EMERGENCY MEDICINE | Admitting: INTERNAL MEDICINE
Payer: COMMERCIAL

## 2025-05-19 ENCOUNTER — APPOINTMENT (OUTPATIENT)
Dept: MRI IMAGING | Facility: HOSPITAL | Age: 60
End: 2025-05-19
Payer: COMMERCIAL

## 2025-05-19 ENCOUNTER — APPOINTMENT (OUTPATIENT)
Dept: GENERAL RADIOLOGY | Facility: HOSPITAL | Age: 60
End: 2025-05-19
Payer: COMMERCIAL

## 2025-05-19 DIAGNOSIS — H34.12 CENTRAL RETINAL ARTERY OCCLUSION OF LEFT EYE: Primary | ICD-10-CM

## 2025-05-19 DIAGNOSIS — I63.9 EMBOLIC STROKE: ICD-10-CM

## 2025-05-19 PROBLEM — E78.5 HYPERLIPIDEMIA: Status: ACTIVE | Noted: 2025-05-19

## 2025-05-19 PROBLEM — H53.9 VISUAL DISTURBANCE: Status: ACTIVE | Noted: 2025-05-19

## 2025-05-19 PROBLEM — H34.9 RETINAL ARTERY OCCLUSION: Status: ACTIVE | Noted: 2025-05-19

## 2025-05-19 LAB
ABO GROUP BLD: NORMAL
ALBUMIN SERPL-MCNC: 4.8 G/DL (ref 3.5–5.2)
ALBUMIN/GLOB SERPL: 1.8 G/DL
ALP SERPL-CCNC: 90 U/L (ref 39–117)
ALT SERPL W P-5'-P-CCNC: 18 U/L (ref 1–41)
ANION GAP SERPL CALCULATED.3IONS-SCNC: 10 MMOL/L (ref 5–15)
ANION GAP SERPL CALCULATED.3IONS-SCNC: 10.3 MMOL/L (ref 5–15)
AST SERPL-CCNC: 22 U/L (ref 1–40)
BASOPHILS # BLD AUTO: 0.04 10*3/MM3 (ref 0–0.2)
BASOPHILS # BLD AUTO: 0.05 10*3/MM3 (ref 0–0.2)
BASOPHILS NFR BLD AUTO: 0.7 % (ref 0–1.5)
BASOPHILS NFR BLD AUTO: 0.8 % (ref 0–1.5)
BILIRUB SERPL-MCNC: 0.5 MG/DL (ref 0–1.2)
BLD GP AB SCN SERPL QL: NEGATIVE
BUN SERPL-MCNC: 12 MG/DL (ref 8–23)
BUN SERPL-MCNC: 13 MG/DL (ref 8–23)
BUN/CREAT SERPL: 10.3 (ref 7–25)
BUN/CREAT SERPL: 9.9 (ref 7–25)
CALCIUM SPEC-SCNC: 9 MG/DL (ref 8.6–10.5)
CALCIUM SPEC-SCNC: 9.5 MG/DL (ref 8.6–10.5)
CHLORIDE SERPL-SCNC: 102 MMOL/L (ref 98–107)
CHLORIDE SERPL-SCNC: 98 MMOL/L (ref 98–107)
CO2 SERPL-SCNC: 24.7 MMOL/L (ref 22–29)
CO2 SERPL-SCNC: 27 MMOL/L (ref 22–29)
CREAT SERPL-MCNC: 1.21 MG/DL (ref 0.76–1.27)
CREAT SERPL-MCNC: 1.26 MG/DL (ref 0.76–1.27)
CRP SERPL-MCNC: <0.3 MG/DL (ref 0–0.5)
DEPRECATED RDW RBC AUTO: 42.3 FL (ref 37–54)
DEPRECATED RDW RBC AUTO: 42.9 FL (ref 37–54)
EGFRCR SERPLBLD CKD-EPI 2021: 65.3 ML/MIN/1.73
EGFRCR SERPLBLD CKD-EPI 2021: 68.5 ML/MIN/1.73
EOSINOPHIL # BLD AUTO: 0.12 10*3/MM3 (ref 0–0.4)
EOSINOPHIL # BLD AUTO: 0.32 10*3/MM3 (ref 0–0.4)
EOSINOPHIL NFR BLD AUTO: 2 % (ref 0.3–6.2)
EOSINOPHIL NFR BLD AUTO: 4.9 % (ref 0.3–6.2)
ERYTHROCYTE [DISTWIDTH] IN BLOOD BY AUTOMATED COUNT: 12.6 % (ref 12.3–15.4)
ERYTHROCYTE [DISTWIDTH] IN BLOOD BY AUTOMATED COUNT: 12.7 % (ref 12.3–15.4)
ERYTHROCYTE [SEDIMENTATION RATE] IN BLOOD: 3 MM/HR (ref 0–20)
GLOBULIN UR ELPH-MCNC: 2.7 GM/DL
GLUCOSE BLDC GLUCOMTR-MCNC: 136 MG/DL (ref 70–105)
GLUCOSE SERPL-MCNC: 107 MG/DL (ref 65–99)
GLUCOSE SERPL-MCNC: 124 MG/DL (ref 65–99)
HBA1C MFR BLD: 5.37 % (ref 4.8–5.6)
HCT VFR BLD AUTO: 40.9 % (ref 37.5–51)
HCT VFR BLD AUTO: 43.8 % (ref 37.5–51)
HGB BLD-MCNC: 13.6 G/DL (ref 13–17.7)
HGB BLD-MCNC: 14.5 G/DL (ref 13–17.7)
HOLD SPECIMEN: NORMAL
HOLD SPECIMEN: NORMAL
IMM GRANULOCYTES # BLD AUTO: 0.02 10*3/MM3 (ref 0–0.05)
IMM GRANULOCYTES # BLD AUTO: 0.03 10*3/MM3 (ref 0–0.05)
IMM GRANULOCYTES NFR BLD AUTO: 0.3 % (ref 0–0.5)
IMM GRANULOCYTES NFR BLD AUTO: 0.5 % (ref 0–0.5)
INR PPP: 1.88 (ref 0.9–1.1)
LYMPHOCYTES # BLD AUTO: 1.17 10*3/MM3 (ref 0.7–3.1)
LYMPHOCYTES # BLD AUTO: 2.04 10*3/MM3 (ref 0.7–3.1)
LYMPHOCYTES NFR BLD AUTO: 20 % (ref 19.6–45.3)
LYMPHOCYTES NFR BLD AUTO: 31.1 % (ref 19.6–45.3)
MCH RBC QN AUTO: 30.2 PG (ref 26.6–33)
MCH RBC QN AUTO: 30.4 PG (ref 26.6–33)
MCHC RBC AUTO-ENTMCNC: 33.1 G/DL (ref 31.5–35.7)
MCHC RBC AUTO-ENTMCNC: 33.3 G/DL (ref 31.5–35.7)
MCV RBC AUTO: 90.9 FL (ref 79–97)
MCV RBC AUTO: 91.8 FL (ref 79–97)
MONOCYTES # BLD AUTO: 0.34 10*3/MM3 (ref 0.1–0.9)
MONOCYTES # BLD AUTO: 0.57 10*3/MM3 (ref 0.1–0.9)
MONOCYTES NFR BLD AUTO: 5.8 % (ref 5–12)
MONOCYTES NFR BLD AUTO: 8.7 % (ref 5–12)
NEUTROPHILS NFR BLD AUTO: 3.56 10*3/MM3 (ref 1.7–7)
NEUTROPHILS NFR BLD AUTO: 4.16 10*3/MM3 (ref 1.7–7)
NEUTROPHILS NFR BLD AUTO: 54.2 % (ref 42.7–76)
NEUTROPHILS NFR BLD AUTO: 71 % (ref 42.7–76)
NRBC BLD AUTO-RTO: 0 /100 WBC (ref 0–0.2)
NRBC BLD AUTO-RTO: 0 /100 WBC (ref 0–0.2)
PLATELET # BLD AUTO: 250 10*3/MM3 (ref 140–450)
PLATELET # BLD AUTO: 258 10*3/MM3 (ref 140–450)
PMV BLD AUTO: 9.2 FL (ref 6–12)
PMV BLD AUTO: 9.3 FL (ref 6–12)
POTASSIUM SERPL-SCNC: 3.8 MMOL/L (ref 3.5–5.2)
POTASSIUM SERPL-SCNC: 3.9 MMOL/L (ref 3.5–5.2)
PROT SERPL-MCNC: 7.5 G/DL (ref 6–8.5)
PROTHROMBIN TIME: 21.7 SECONDS (ref 11.7–14.2)
RBC # BLD AUTO: 4.5 10*6/MM3 (ref 4.14–5.8)
RBC # BLD AUTO: 4.77 10*6/MM3 (ref 4.14–5.8)
RH BLD: POSITIVE
SODIUM SERPL-SCNC: 135 MMOL/L (ref 136–145)
SODIUM SERPL-SCNC: 137 MMOL/L (ref 136–145)
T&S EXPIRATION DATE: NORMAL
T4 FREE SERPL-MCNC: 1.3 NG/DL (ref 0.92–1.68)
TSH SERPL DL<=0.05 MIU/L-ACNC: 2.46 UIU/ML (ref 0.27–4.2)
VIT B12 BLD-MCNC: 838 PG/ML (ref 211–946)
WBC NRBC COR # BLD AUTO: 5.86 10*3/MM3 (ref 3.4–10.8)
WBC NRBC COR # BLD AUTO: 6.56 10*3/MM3 (ref 3.4–10.8)
WHOLE BLOOD HOLD COAG: NORMAL
WHOLE BLOOD HOLD SPECIMEN: NORMAL

## 2025-05-19 PROCEDURE — 93005 ELECTROCARDIOGRAM TRACING: CPT | Performed by: EMERGENCY MEDICINE

## 2025-05-19 PROCEDURE — 93005 ELECTROCARDIOGRAM TRACING: CPT

## 2025-05-19 PROCEDURE — 85025 COMPLETE CBC W/AUTO DIFF WBC: CPT

## 2025-05-19 PROCEDURE — 86140 C-REACTIVE PROTEIN: CPT

## 2025-05-19 PROCEDURE — 70551 MRI BRAIN STEM W/O DYE: CPT

## 2025-05-19 PROCEDURE — G0378 HOSPITAL OBSERVATION PER HR: HCPCS

## 2025-05-19 PROCEDURE — 25510000001 IOPAMIDOL PER 1 ML: Performed by: EMERGENCY MEDICINE

## 2025-05-19 PROCEDURE — 86901 BLOOD TYPING SEROLOGIC RH(D): CPT

## 2025-05-19 PROCEDURE — 70496 CT ANGIOGRAPHY HEAD: CPT

## 2025-05-19 PROCEDURE — 85652 RBC SED RATE AUTOMATED: CPT

## 2025-05-19 PROCEDURE — 82607 VITAMIN B-12: CPT | Performed by: PSYCHIATRY & NEUROLOGY

## 2025-05-19 PROCEDURE — 80053 COMPREHEN METABOLIC PANEL: CPT | Performed by: EMERGENCY MEDICINE

## 2025-05-19 PROCEDURE — 71045 X-RAY EXAM CHEST 1 VIEW: CPT

## 2025-05-19 PROCEDURE — 85025 COMPLETE CBC W/AUTO DIFF WBC: CPT | Performed by: EMERGENCY MEDICINE

## 2025-05-19 PROCEDURE — 84439 ASSAY OF FREE THYROXINE: CPT | Performed by: PSYCHIATRY & NEUROLOGY

## 2025-05-19 PROCEDURE — 84443 ASSAY THYROID STIM HORMONE: CPT | Performed by: PSYCHIATRY & NEUROLOGY

## 2025-05-19 PROCEDURE — 85610 PROTHROMBIN TIME: CPT | Performed by: EMERGENCY MEDICINE

## 2025-05-19 PROCEDURE — 96376 TX/PRO/DX INJ SAME DRUG ADON: CPT

## 2025-05-19 PROCEDURE — 96374 THER/PROPH/DIAG INJ IV PUSH: CPT

## 2025-05-19 PROCEDURE — 82948 REAGENT STRIP/BLOOD GLUCOSE: CPT

## 2025-05-19 PROCEDURE — 86850 RBC ANTIBODY SCREEN: CPT | Performed by: EMERGENCY MEDICINE

## 2025-05-19 PROCEDURE — 99285 EMERGENCY DEPT VISIT HI MDM: CPT

## 2025-05-19 PROCEDURE — 86900 BLOOD TYPING SEROLOGIC ABO: CPT

## 2025-05-19 PROCEDURE — 74176 CT ABD & PELVIS W/O CONTRAST: CPT

## 2025-05-19 PROCEDURE — 86901 BLOOD TYPING SEROLOGIC RH(D): CPT | Performed by: EMERGENCY MEDICINE

## 2025-05-19 PROCEDURE — 70450 CT HEAD/BRAIN W/O DYE: CPT

## 2025-05-19 PROCEDURE — 83036 HEMOGLOBIN GLYCOSYLATED A1C: CPT

## 2025-05-19 PROCEDURE — 70498 CT ANGIOGRAPHY NECK: CPT

## 2025-05-19 PROCEDURE — 86900 BLOOD TYPING SEROLOGIC ABO: CPT | Performed by: EMERGENCY MEDICINE

## 2025-05-19 PROCEDURE — 25010000002 DIAZEPAM PER 5 MG: Performed by: EMERGENCY MEDICINE

## 2025-05-19 RX ORDER — ASPIRIN 81 MG/1
81 TABLET ORAL DAILY
Status: DISCONTINUED | OUTPATIENT
Start: 2025-05-19 | End: 2025-05-20 | Stop reason: HOSPADM

## 2025-05-19 RX ORDER — LISINOPRIL 5 MG/1
10 TABLET ORAL DAILY
Status: DISCONTINUED | OUTPATIENT
Start: 2025-05-19 | End: 2025-05-20 | Stop reason: HOSPADM

## 2025-05-19 RX ORDER — DIAZEPAM 10 MG/2ML
5 INJECTION, SOLUTION INTRAMUSCULAR; INTRAVENOUS EVERY 4 HOURS PRN
Status: DISCONTINUED | OUTPATIENT
Start: 2025-05-19 | End: 2025-05-20 | Stop reason: HOSPADM

## 2025-05-19 RX ORDER — ATORVASTATIN CALCIUM 40 MG/1
40 TABLET, FILM COATED ORAL NIGHTLY
Status: DISCONTINUED | OUTPATIENT
Start: 2025-05-19 | End: 2025-05-19

## 2025-05-19 RX ORDER — SODIUM CHLORIDE 0.9 % (FLUSH) 0.9 %
10 SYRINGE (ML) INJECTION EVERY 12 HOURS SCHEDULED
Status: DISCONTINUED | OUTPATIENT
Start: 2025-05-19 | End: 2025-05-20 | Stop reason: HOSPADM

## 2025-05-19 RX ORDER — IOPAMIDOL 755 MG/ML
100 INJECTION, SOLUTION INTRAVASCULAR
Status: COMPLETED | OUTPATIENT
Start: 2025-05-19 | End: 2025-05-19

## 2025-05-19 RX ORDER — ACETAMINOPHEN 325 MG/1
650 TABLET ORAL EVERY 6 HOURS PRN
COMMUNITY

## 2025-05-19 RX ORDER — SODIUM CHLORIDE 9 MG/ML
40 INJECTION, SOLUTION INTRAVENOUS AS NEEDED
Status: DISCONTINUED | OUTPATIENT
Start: 2025-05-19 | End: 2025-05-20 | Stop reason: HOSPADM

## 2025-05-19 RX ORDER — PANTOPRAZOLE SODIUM 40 MG/1
40 TABLET, DELAYED RELEASE ORAL
Status: DISCONTINUED | OUTPATIENT
Start: 2025-05-20 | End: 2025-05-20 | Stop reason: HOSPADM

## 2025-05-19 RX ORDER — SODIUM CHLORIDE 0.9 % (FLUSH) 0.9 %
10 SYRINGE (ML) INJECTION AS NEEDED
Status: DISCONTINUED | OUTPATIENT
Start: 2025-05-19 | End: 2025-05-20 | Stop reason: HOSPADM

## 2025-05-19 RX ORDER — ATORVASTATIN CALCIUM 40 MG/1
80 TABLET, FILM COATED ORAL NIGHTLY
Status: DISCONTINUED | OUTPATIENT
Start: 2025-05-19 | End: 2025-05-20 | Stop reason: HOSPADM

## 2025-05-19 RX ORDER — HYDRALAZINE HYDROCHLORIDE 20 MG/ML
10 INJECTION INTRAMUSCULAR; INTRAVENOUS EVERY 6 HOURS PRN
Status: DISCONTINUED | OUTPATIENT
Start: 2025-05-19 | End: 2025-05-20 | Stop reason: HOSPADM

## 2025-05-19 RX ORDER — ONDANSETRON 2 MG/ML
4 INJECTION INTRAMUSCULAR; INTRAVENOUS EVERY 6 HOURS PRN
Status: DISCONTINUED | OUTPATIENT
Start: 2025-05-19 | End: 2025-05-20 | Stop reason: HOSPADM

## 2025-05-19 RX ADMIN — ASPIRIN 81 MG: 81 TABLET, COATED ORAL at 21:31

## 2025-05-19 RX ADMIN — DIAZEPAM 5 MG: 10 INJECTION, SOLUTION INTRAMUSCULAR; INTRAVENOUS at 13:55

## 2025-05-19 RX ADMIN — LISINOPRIL 10 MG: 5 TABLET ORAL at 17:37

## 2025-05-19 RX ADMIN — RIVAROXABAN 15 MG: 15 TABLET, FILM COATED ORAL at 21:31

## 2025-05-19 RX ADMIN — DIAZEPAM 5 MG: 10 INJECTION, SOLUTION INTRAMUSCULAR; INTRAVENOUS at 22:06

## 2025-05-19 RX ADMIN — Medication 10 ML: at 21:31

## 2025-05-19 RX ADMIN — ATORVASTATIN CALCIUM 80 MG: 40 TABLET, FILM COATED ORAL at 21:30

## 2025-05-19 RX ADMIN — IOPAMIDOL 100 ML: 755 INJECTION, SOLUTION INTRAVENOUS at 12:57

## 2025-05-19 NOTE — ED PROVIDER NOTES
Subjective   History of Present Illness  Chief complaint: Blurry vision    59-year-old male presents with blurry vision in the left eye.  Patient was in the hospital last week for chest pain.  He had a heart catheterization on Friday that was unremarkable.  He was discharged.  On Saturday he noticed blurry vision in his left eye.  He states there is a gray area in his left eye visual field.  He went to the eye doctor today and was diagnosed with a central retinal artery occlusion.  He was sent to the emergency room for stroke evaluation.  He states he has had some mild tingling in his left arm however that was happening with his chest discomfort last week as well.  He denies any other focal numbness or weakness.  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  CT PE protocol was negative.    History provided by:  Patient      Review of Systems   Constitutional:  Negative for fever.   HENT:  Negative for congestion.    Eyes:  Positive for visual disturbance.   Respiratory:  Negative for cough and shortness of breath.    Cardiovascular:  Negative for chest pain.   Gastrointestinal:  Negative for abdominal pain and vomiting.   Musculoskeletal:  Negative for back pain.   Neurological:  Negative for headaches.   Psychiatric/Behavioral:  Negative for confusion.        Past Medical History:   Diagnosis Date    Laryngopharyngeal reflux (LPR)        No Known Allergies    Past Surgical History:   Procedure Laterality Date    CARDIAC CATHETERIZATION N/A 5/16/2025    Procedure: Left Heart Cath radial;  Surgeon: Richard Moore DO;  Location: [facility] CATH INVASIVE LOCATION;  Service: Cardiovascular;  Laterality: N/A;    COLONOSCOPY      COLONOSCOPY N/A 1/29/2024    Procedure: COLONOSCOPY WITH POLYPECTOMY X1;  Surgeon: Tre Fitzpatrick MD;  Location: [facility] ENDOSCOPY;  Service: Gastroenterology;  Laterality: N/A;  POST: POLYP    ENDOSCOPY N/A  "1/29/2024    Procedure: ESOPHAGOGASTRODUODENOSCOPY WITH BIOPSY X1 AREA AND DILATION UP TO 50;  Surgeon: Tre Fitzpatrick MD;  Location: [facility] ENDOSCOPY;  Service: Gastroenterology;  Laterality: N/A;  POST: GASTRITIS, ESOPHAGEAL STRICTURE       Family History   Problem Relation Age of Onset    Cancer Father        Social History     Socioeconomic History    Marital status: Single   Tobacco Use    Smoking status: Never    Smokeless tobacco: Never   Vaping Use    Vaping status: Never Used   Substance and Sexual Activity    Alcohol use: Yes     Comment: Occasional    Drug use: Never    Sexual activity: Defer       /93   Pulse 74   Temp 98.8 °F (37.1 °C) (Oral)   Resp 18   Ht 182.9 cm (72\")   Wt 77.7 kg (171 lb 4.8 oz)   SpO2 98%   BMI 23.23 kg/m²       Objective   Physical Exam  Vitals and nursing note reviewed.   Constitutional:       Appearance: Normal appearance.   HENT:      Head: Normocephalic and atraumatic.      Mouth/Throat:      Mouth: Mucous membranes are moist.   Cardiovascular:      Rate and Rhythm: Normal rate and regular rhythm.      Heart sounds: Normal heart sounds.   Pulmonary:      Effort: Pulmonary effort is normal. No respiratory distress.      Breath sounds: Normal breath sounds.   Abdominal:      Palpations: Abdomen is soft.      Tenderness: There is no abdominal tenderness.   Skin:     General: Skin is warm and dry.   Neurological:      General: No focal deficit present.      Mental Status: He is alert and oriented to person, place, and time.         Procedures           ED Course      Results for orders placed or performed during the hospital encounter of 05/19/25   ECG 12 Lead Chest Pain    Collection Time: 05/19/25 12:02 PM   Result Value Ref Range    QT Interval 393 ms    QTC Interval 459 ms   Comprehensive Metabolic Panel    Collection Time: 05/19/25 12:23 PM    Specimen: Blood   Result Value Ref Range    Glucose 124 (H) 65 - 99 mg/dL    BUN 12 8 - 23 mg/dL    Creatinine " 1.21 0.76 - 1.27 mg/dL    Sodium 135 (L) 136 - 145 mmol/L    Potassium 3.8 3.5 - 5.2 mmol/L    Chloride 98 98 - 107 mmol/L    CO2 27.0 22.0 - 29.0 mmol/L    Calcium 9.5 8.6 - 10.5 mg/dL    Total Protein 7.5 6.0 - 8.5 g/dL    Albumin 4.8 3.5 - 5.2 g/dL    ALT (SGPT) 18 1 - 41 U/L    AST (SGOT) 22 1 - 40 U/L    Alkaline Phosphatase 90 39 - 117 U/L    Total Bilirubin 0.5 0.0 - 1.2 mg/dL    Globulin 2.7 gm/dL    A/G Ratio 1.8 g/dL    BUN/Creatinine Ratio 9.9 7.0 - 25.0    Anion Gap 10.0 5.0 - 15.0 mmol/L    eGFR 68.5 >60.0 mL/min/1.73   CBC Auto Differential    Collection Time: 05/19/25 12:23 PM    Specimen: Blood   Result Value Ref Range    WBC 5.86 3.40 - 10.80 10*3/mm3    RBC 4.77 4.14 - 5.80 10*6/mm3    Hemoglobin 14.5 13.0 - 17.7 g/dL    Hematocrit 43.8 37.5 - 51.0 %    MCV 91.8 79.0 - 97.0 fL    MCH 30.4 26.6 - 33.0 pg    MCHC 33.1 31.5 - 35.7 g/dL    RDW 12.6 12.3 - 15.4 %    RDW-SD 42.9 37.0 - 54.0 fl    MPV 9.2 6.0 - 12.0 fL    Platelets 250 140 - 450 10*3/mm3    Neutrophil % 71.0 42.7 - 76.0 %    Lymphocyte % 20.0 19.6 - 45.3 %    Monocyte % 5.8 5.0 - 12.0 %    Eosinophil % 2.0 0.3 - 6.2 %    Basophil % 0.7 0.0 - 1.5 %    Immature Grans % 0.5 0.0 - 0.5 %    Neutrophils, Absolute 4.16 1.70 - 7.00 10*3/mm3    Lymphocytes, Absolute 1.17 0.70 - 3.10 10*3/mm3    Monocytes, Absolute 0.34 0.10 - 0.90 10*3/mm3    Eosinophils, Absolute 0.12 0.00 - 0.40 10*3/mm3    Basophils, Absolute 0.04 0.00 - 0.20 10*3/mm3    Immature Grans, Absolute 0.03 0.00 - 0.05 10*3/mm3    nRBC 0.0 0.0 - 0.2 /100 WBC   Green Top (Gel)    Collection Time: 05/19/25 12:23 PM   Result Value Ref Range    Extra Tube Hold for add-ons.    Lavender Top    Collection Time: 05/19/25 12:23 PM   Result Value Ref Range    Extra Tube hold for add-on    Gold Top - SST    Collection Time: 05/19/25 12:23 PM   Result Value Ref Range    Extra Tube Hold for add-ons.    Light Blue Top    Collection Time: 05/19/25 12:23 PM   Result Value Ref Range    Extra Tube Hold  for add-ons.      XR Chest 1 View  Result Date: 5/19/2025  Impression: No acute chest finding. Electronically Signed: Brynn Mensah MD  5/19/2025 12:39 PM EDT  Workstation ID: [id number]                              Total (NIH Stroke Scale): 1       My interpretation of EKG shows sinus rhythm, rate of 82, no ST elevation              Medical Decision Making  Amount and/or Complexity of Data Reviewed  Labs: ordered.  Radiology: ordered.  ECG/medicine tests: ordered.    Risk  Prescription drug management.      Patient had the above evaluation.  Results were discussed with the patient.  My interpretation of chest x-ray shows no infiltrate or effusion.  EKG shows no acute ischemia.  White blood cell count is normal.  CMP is unremarkable.  No I discussed with Dr. King with neurology who recommends full stroke workup with CT head, CTA head and neck, MRI brain.  This is ordered and pending.  I discussed with the on-call primary doctor and the patient will be admitted for further stroke workup.      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       Medication List      No changes were made to your prescriptions during this visit.            Arun Calderon MD  05/19/25 [id number]

## 2025-05-19 NOTE — Clinical Note
Level of Care: Telemetry [5]   Admitting Physician: TRACIE BREWER [[id number]]   Attending Physician: TRACIE BREWER [[id number]]   Bed Request Comments: NOEMÍ

## 2025-05-19 NOTE — CONSULTS
Primary Care Provider: Jaquelin Delgado MD     Consult requested by: ER admitting team    Reason for Consultation: Neurological evaluation /vision changes, retinal artery occlusion    History taken from: patient chart family my discussion with the ER admitting team    Chief complaint: Left eye vision problem, spot in the vision in the center       SUBJECTIVE:    History of present illness: Background per H&P: Flip Vang is a 59 y.o. male who was evaluated in room 26 in the ER at [facility]    Source of information is the patient in my discussion with the team      And he says the next morning when he woke up he had a spot almost in the center of the left eye    He eventually saw an optometrist or someone and was told that he has multiple spots, white spots, I believe referring to embolic type retinal artery occlusion    He was already initiated on rivaroxaban and he took the 15 mg already    He does not take aspirin    He does not smoke  Otherwise his exam is unremarkable      He had full cardiac workup done  He has no history of hypercoagulable state or malignancy      So 1 option was to put him on aspirin, the other option was to get a DARREN and event monitor or loop recorder on him and may have even consider hypercoagulable state but now he is on rivaroxaban    He had chest CT done but he may need other imaging studies    So he will be admitted for that    Except for the vision deficiency he is nonfocal    Obviously he was not tenecteplase or intervention candidate      Blood pressure between 144-164 systolic  Afebrile and normal heart rate  INR 1.88  Random glucose 124   white count 5.86      Brain, essentially negative,  Impression:   Normal MR brain without contrast.         Electronically Signed: Brynn Mensah MD    5/19/2025 2:54 PM EDT    Workstation ID: [id number]     CT angiogram essentially negative,  Impression:     1.No acute abnormality is identified within the large arteries of the  head or neck.  2.No significant stenosis of the bilateral internal carotid arteries.            Electronically Signed: Giovani Lovell MD   5/19/2025 1:40 PM EDT   Workstation ID: [id number]       As per ER team this gentleman was here last week and had a cath done    59-year-old male presents with blurry vision in the left eye.  Patient was in the hospital last week for chest pain.  He had a heart catheterization on Friday that was unremarkable.  He was discharged.  On Saturday he noticed blurry vision in his left eye.  He states there is a gray area in his left eye visual field.  He went to the eye doctor today and was diagnosed with a central retinal artery occlusion.  He was sent to the emergency room for stroke evaluation.  He states he has had some mild tingling in his left arm however that was happening with his chest discomfort last week as well.  He denies any other focal numbness or weakness.  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  CT PE protocol was negative.     History provided by:  Patient         - Portions of the above HPI were copied from previous encounters and edited as appropriate. PMH as detailed below.     Review of Systems   No fever chills rigors or sweats  No weight issues  No sleep problems  HEENT:  No speech problem, vision changes, facial asymmetry or pain, or neck problem, history of laryngal pharyngeal reflux  Chest: No chest pain, clubbing, cyanosis, orthopnea palpitations  Pulmonary:  No shortness of air, cough or expectoration  Abdomen:  No swelling/tension, constipation,diarrhea or pain  No genitourinary symptoms  Extremity problems as discussed  No back problem  No psychotic issues  Neurologic issues as discussed  No hematologic, dermatologic or endocrine problems        PATIENT HISTORY:  Past Medical History:   Diagnosis Date    Laryngopharyngeal reflux (LPR)    ,   Past Surgical History:   Procedure  Laterality Date    CARDIAC CATHETERIZATION N/A 5/16/2025    Procedure: Left Heart Cath radial;  Surgeon: Richard Moore DO;  Location: [facility] CATH INVASIVE LOCATION;  Service: Cardiovascular;  Laterality: N/A;    COLONOSCOPY      COLONOSCOPY N/A 1/29/2024    Procedure: COLONOSCOPY WITH POLYPECTOMY X1;  Surgeon: Tre Fitzpatrick MD;  Location: [facility] ENDOSCOPY;  Service: Gastroenterology;  Laterality: N/A;  POST: POLYP    ENDOSCOPY N/A 1/29/2024    Procedure: ESOPHAGOGASTRODUODENOSCOPY WITH BIOPSY X1 AREA AND DILATION UP TO 50;  Surgeon: Tre Fitzpatrick MD;  Location: [facility] ENDOSCOPY;  Service: Gastroenterology;  Laterality: N/A;  POST: GASTRITIS, ESOPHAGEAL STRICTURE   ,   Family History   Problem Relation Age of Onset    Cancer Father    ,   Social History     Tobacco Use    Smoking status: Never    Smokeless tobacco: Never   Vaping Use    Vaping status: Never Used   Substance Use Topics    Alcohol use: Yes     Comment: Occasional    Drug use: Never   ,   Prior to Admission medications    Medication Sig Start Date End Date Taking? Authorizing Provider   acetaminophen (TYLENOL) 325 MG tablet Take 2 tablets by mouth Every 6 (Six) Hours As Needed for Mild Pain.   Yes ProviderLorenza MD   omeprazole (priLOSEC) 20 MG capsule Take 1 capsule by mouth Daily. 12/24/23  Yes Mariella Mendoza APRN   rivaroxaban (XARELTO) 15 MG tablet Take 1 tablet by mouth 2 (Two) Times a Day With Meals. 5/19/25 6/8/25 Yes Lorenza Álvarez MD   Rivaroxaban (XARELTO) tablet therapy pack starter pack Take one 15 mg tablet twice daily with food for 21 days.  Followed by one 20 mg tablet by mouth once daily with food. Take as directed 5/16/25 5/19/25 Yes Enriqueta Burciaga PA-C   Lidocaine Viscous HCl (XYLOCAINE) 2 % solution 5cc with Maalox twice daily for up to 1 week for GERD symptoms 5/16/25   Enriqueta Burciaga PA-C   lisinopril (PRINIVIL,ZESTRIL) 10 MG tablet Take 1 tablet by mouth Daily. 4/25/25    Provider, MD Lorenza   rivaroxaban (XARELTO) 20 MG tablet Take 1 tablet by mouth Daily. 6/9/25   ProviderLorenza MD    Allergies:  Patient has no known allergies.    Current Facility-Administered Medications   Medication Dose Route Frequency Provider Last Rate Last Admin    diazePAM (VALIUM) injection 5 mg  5 mg Intravenous Q4H PRN Arun Calderon MD   5 mg at 05/19/25 1355    sodium chloride 0.9 % flush 10 mL  10 mL Intravenous PRN Arun Calderon MD         Current Outpatient Medications   Medication Sig Dispense Refill    acetaminophen (TYLENOL) 325 MG tablet Take 2 tablets by mouth Every 6 (Six) Hours As Needed for Mild Pain.      omeprazole (priLOSEC) 20 MG capsule Take 1 capsule by mouth Daily. 20 capsule 0    rivaroxaban (XARELTO) 15 MG tablet Take 1 tablet by mouth 2 (Two) Times a Day With Meals.      Lidocaine Viscous HCl (XYLOCAINE) 2 % solution 5cc with Maalox twice daily for up to 1 week for GERD symptoms 100 mL 0    lisinopril (PRINIVIL,ZESTRIL) 10 MG tablet Take 1 tablet by mouth Daily.      [START ON 6/9/2025] rivaroxaban (XARELTO) 20 MG tablet Take 1 tablet by mouth Daily.          ________________________________________________________        OBJECTIVE:  Upon today's exam, the gentleman is resting comfortably in bed in no acute distress      The patient is awake and alert and oriented x3     Cranial nerve examination demonstrate:  Full fields of vision to confrontation then possibility of scotoma left of midline, small deficit pupils are round, reactive to light and accommodation and size of about 3 mm  No ptosis or nystagmus  Funduscopic examination was not successful  Eye movements are conjugate     Sensation on the face and scalp are normal  Muscles of mastication are normal and symmetric  Muscles of  facial expression are normal and symmetric  Hearing is intact bilaterally  Head turning and shoulder shrugs were unremarkable  Tongue was midline  I could not visualize  oropharynx or  uvula     Motor examination:  Normal bulk, tone and strength was 5/5  No fasciculations     Sensory examination:  Intact for soft touch, pain   Romberg was not evaluated     Reflexes:  0/4     Coordination:  Normal finger-to-nose to finger, rapid alternating movements and toe to finger     Gait:  Deferred     Toe signs:  Mute      NIH Stroke Scale  1a. Level of Consciousness: 0-->Alert, keenly responsive  1b. LOC Questions: 0-->Answers both questions correctly  1c. LOC Commands: 0-->Performs both tasks correctly  2. Best Gaze: 0-->Normal  3. Visual: 1-->Partial hemianopia  4. Facial Palsy: 0-->Normal symmetrical movements  5a. Motor Arm, Left: 0-->No drift, limb holds 90 (or 45) degrees for full 10 secs (feels numbness/tingling)  5b. Motor Arm, Right: 0-->No drift, limb holds 90 (or 45) degrees for full 10 secs  6a. Motor Leg, Left: 0-->No drift, leg holds 30 degree position for full 5 secs  6b. Motor Leg, Right: 0-->No drift, leg holds 30 degree position for full 5 secs  7. Limb Ataxia: 0-->Absent  8. Sensory: 0-->Normal, no sensory loss  9. Best Language: 0-->No aphasia, normal  10. Dysarthria: 0-->Normal  11. Extinction and Inattention (formerly Neglect): 0-->No abnormality  Total (NIH Stroke Scale): 1         ________________________________________________________   RESULTS REVIEW:    VITAL SIGNS:   Temp:  [98.8 °F (37.1 °C)] 98.8 °F (37.1 °C)  Heart Rate:  [67-93] 67  Resp:  [18] 18  BP: (144-164)/(72-93) 144/80     LABS:      Lab 05/19/25  1223 05/16/25  0426 05/15/25  0041 05/14/25 2015   WBC 5.86 8.33 6.84 8.08   HEMOGLOBIN 14.5 13.2 11.5* 13.1   HEMATOCRIT 43.8 40.8 35.5* 40.1   PLATELETS 250 249 212 256   NEUTROS ABS 4.16 7.12*  --  5.57   IMMATURE GRANS (ABS) 0.03 0.03  --  0.02   LYMPHS ABS 1.17 0.82  --  1.56   MONOS ABS 0.34 0.35  --  0.54   EOS ABS 0.12 0.00  --  0.31   MCV 91.8 92.5 93.7 92.6   PROTIME 21.7* 13.5  --   --    D DIMER QUANT  --   --   --  1.38*         Lab 05/19/25  1223  05/16/25  0426 05/15/25  1212 05/15/25  0041 05/14/25  2015   SODIUM 135* 139 140 141 140   POTASSIUM 3.8 4.1 3.9 4.3 4.7   CHLORIDE 98 106 107 104 104   CO2 27.0 24.2 22.3 26.3 27.7   ANION GAP 10.0 8.8 10.7 10.7 8.3   BUN 12 13 12 13 14   CREATININE 1.21 1.12 1.03 1.34* 1.32*   EGFR 68.5 75.2 83.2 60.6 61.7   GLUCOSE 124* 119* 94 105* 102*   CALCIUM 9.5 9.2 9.0 8.9 9.5   MAGNESIUM  --   --   --   --  2.2   PHOSPHORUS  --   --  2.9  --   --    TSH  --   --   --   --  2.490         Lab 05/19/25  1223 05/15/25  1212 05/14/25  2015   TOTAL PROTEIN 7.5  --  7.1   ALBUMIN 4.8 4.3 4.6   GLOBULIN 2.7  --  2.5   ALT (SGPT) 18  --  17   AST (SGOT) 22  --  23   BILIRUBIN 0.5  --  0.4   ALK PHOS 90  --  95   LIPASE  --   --  32         Lab 05/19/25  1223 05/16/25  0426 05/15/25  0041 05/14/25  2120 05/14/25  2015   HSTROP T  --   --  8 7 6   PROTIME 21.7* 13.5  --   --   --    INR 1.88* 1.04  --   --   --          Lab 05/15/25  0041   CHOLESTEROL 222*   LDL CHOL 137*   HDL CHOL 56   TRIGLYCERIDES 161*             UA          10/13/2024    09:19 5/14/2025    20:15   Urinalysis   Specific [place], UA  1.011    Ketones, UA Negative  Negative    Blood, UA  Negative    Leukocytes, UA Negative  Negative    Nitrite, UA  Negative        Lab Results   Component Value Date    TSH 2.490 05/14/2025     (H) 05/15/2025       IMAGING STUDIES:  CT Angiogram Neck  Result Date: 5/19/2025  1.No acute abnormality is identified within the large arteries of the head or neck. 2.No significant stenosis of the bilateral internal carotid arteries. Electronically Signed: Giovani Lovell MD  5/19/2025 1:40 PM EDT  Workstation ID: [id number]    CT Angiogram Head  Result Date: 5/19/2025  1.No acute abnormality is identified within the large arteries of the head or neck. 2.No significant stenosis of the bilateral internal carotid arteries. Electronically Signed: Giovani Lovell MD  5/19/2025 1:40 PM EDT  Workstation ID: [id number]    CT Head Without  Contrast Stroke Protocol  Result Date: 5/19/2025  Impression: No acute intracranial process identified. Electronically Signed: Flip Saha MD  5/19/2025 1:16 PM EDT  Workstation ID: [id number]    XR Chest 1 View  Result Date: 5/19/2025  Impression: No acute chest finding. Electronically Signed: Brynn Mensah MD  5/19/2025 12:39 PM EDT  Workstation ID: [id number]      I reviewed the patient's new clinical results.    ________________________________________________________     PROBLEM LIST:    Laryngopharyngeal reflux (LPR)    Essential hypertension    Greater saphenous vein embolism, left    Retinal artery occlusion    Visual disturbance    Hyperlipidemia            ASSESSMENT/PLAN:  Possibility of retinal artery, though is describing multiple embolic type infarcts    Although workup is negative    He would need embolic workup and he already has been started on rivaroxaban but it was just initiated      He may need hypercoagulable workup    I would recommend DARREN and event monitor or loop recorder    He may need baby aspirin and high-dose Lipitor    Modification of stroke risk factors:   - Blood pressure should be less than 130/80 outpatient, HbA1c less than 6.5, LDL less than 70; b12>500 and smoking cessation if applicable. We would be grateful if the primary team / primary care physician would keep a close watch on the above targets.  - Stroke education  - Follow up with neurologist of choice      I discussed the patient's findings and my recommendations with patient, family, and primary care team    Myriam King MD  05/19/25  14:47 EDT

## 2025-05-19 NOTE — PHARMACY PATIENT ASSISTANCE
Transitions of Care (test claim):    Drug Rivaroxaban:  20 mg PO daily   Covered/PA Required: Covered without PA  Copay Per Month: $40.00  Is the medication eligible for a trial card/copay card? No        Patient used 30-day free trial card when Xarelto Starter Pack was dispensed by [facility] on 05/16/2025. Rxs for maintenance dose (20 mg once daily) appear to have $40 copay for 30-day supply.      Please note that when it states medication is eligible for a trial card that this only means that the medication has a free trial available. This does not assess if the patient has already utilized the once in a lifetime free trial.     This test claim coverage is only valid on the date the note is published. Copay/coverage could vary depending on patient coverage at a later date.  Additionally this test claim is for the sole purpose of a price check not a clinical recommendation.     For billing questions please call [facility] Pharmacist at ext: [phone]  For M2B questions please call [facility] at ext: [phone]      Neeraj Momin, VanceD, BCPS

## 2025-05-19 NOTE — H&P
Patient Care Team:  Aristeo Rodriguez MD as PCP - General (Family Medicine)  Aristeo Rodriguez MD as PCP - Family Medicine  Aristeo Rodriguez MD    Chief complaint Retinal Artery Occlusion     Subjective     Flip Vang is a 59-year-old male presents with blurry vision in the left eye. Patient was in the hospital last week for chest pain, where he had a heart catheterization on Friday that was unremarkable. On Saturday he noticed blurry vision in his left eye that had a gray area in his center visual field. He went to the eye doctor today and was diagnosed with a central retinal artery occlusion. He was sent to the emergency room for stroke evaluation. Patient does report some mild tingling in his left arm however that was happening with his chest discomfort last week as well. He denies any other focal numbness or weakness. He was diagnosed with a chronic appearing DVT bilateral greater saphenous while he was here last week and was started on Xarelto.  First dose taken this a.m.    Review of Systems   Constitutional:  Positive for activity change.   Eyes:  Positive for visual disturbance.   Respiratory:  Positive for chest tightness.    Cardiovascular:  Negative for chest pain.   Psychiatric/Behavioral:  The patient is nervous/anxious.           History  Past Medical History:   Diagnosis Date    Laryngopharyngeal reflux (LPR)      Past Surgical History:   Procedure Laterality Date    CARDIAC CATHETERIZATION N/A 5/16/2025    Procedure: Left Heart Cath radial;  Surgeon: Richard Moore DO;  Location: [facility] CATH INVASIVE LOCATION;  Service: Cardiovascular;  Laterality: N/A;    COLONOSCOPY      COLONOSCOPY N/A 1/29/2024    Procedure: COLONOSCOPY WITH POLYPECTOMY X1;  Surgeon: Tre Fitzpatrick MD;  Location: [facility] ENDOSCOPY;  Service: Gastroenterology;  Laterality: N/A;  POST: POLYP    ENDOSCOPY N/A 1/29/2024    Procedure: ESOPHAGOGASTRODUODENOSCOPY WITH BIOPSY X1 AREA AND DILATION UP TO 50;  Surgeon:  Tre Fitzpatrick MD;  Location: [facility] ENDOSCOPY;  Service: Gastroenterology;  Laterality: N/A;  POST: GASTRITIS, ESOPHAGEAL STRICTURE     Family History   Problem Relation Age of Onset    Cancer Father      Social History     Tobacco Use    Smoking status: Never    Smokeless tobacco: Never   Vaping Use    Vaping status: Never Used   Substance Use Topics    Alcohol use: Yes     Comment: Occasional    Drug use: Never     (Not in a hospital admission)    Allergies:  Patient has no known allergies.    Objective     Vital Signs  Temp:  [98.8 °F (37.1 °C)] 98.8 °F (37.1 °C)  Heart Rate:  [67-93] 67  Resp:  [18] 18  BP: (144-164)/(72-93) 144/80       Physical Exam  Vitals reviewed.   HENT:      Head: Normocephalic.      Right Ear: External ear normal.      Left Ear: External ear normal.      Nose: Nose normal.      Mouth/Throat:      Mouth: Mucous membranes are moist.   Eyes:      General:         Right eye: No discharge.         Left eye: No discharge.   Cardiovascular:      Rate and Rhythm: Normal rate.   Pulmonary:      Effort: Pulmonary effort is normal.   Abdominal:      General: Bowel sounds are normal.   Musculoskeletal:         General: Normal range of motion.   Skin:     General: Skin is warm.   Neurological:      Mental Status: He is alert and oriented to person, place, and time.   Psychiatric:         Mood and Affect: Mood normal.         Behavior: Behavior normal.          Results Review:     Imaging Results (Last 24 Hours)       Procedure Component Value Units Date/Time    CT Angiogram Neck [[id number]] Collected: 05/19/25 1333     Updated: 05/19/25 1342    Narrative:      CT ANGIOGRAM HEAD, CT ANGIOGRAM NECK    Date of Exam: 5/19/2025 12:50 PM EDT    Indication: Neuro deficit, acute, stroke suspected.    Comparison: Noncontrast head CT from today    Technique: CTA of the head was performed after the uneventful intravenous administration of iodinated contrast. Reconstructed coronal and sagittal images  were also obtained. In addition, a 3-D volume rendered image was created for interpretation.   Automated exposure control and iterative reconstruction methods were used.      FINDINGS:    Vascular Findings:    The right common carotid, internal carotid, middle cerebral, anterior cerebral, vertebral, and posterior cerebral arteries are patent without abrupt cut off or aneurysmal dilation.    The left common carotid, internal carotid, middle cerebral, anterior cerebral, vertebral, and posterior cerebral arteries are patent without abrupt cut off or aneurysmal dilation.    Basilar artery appears patent and appears unremarkable.    Non-vascular Findings:    For description of nonvascular intracranial findings, please refer to the noncontrast head CT performed the same date.    No acute abnormality is identified within the visualized soft tissue or bony structures of the neck.    Calcification is seen within the periphery of the superior segment of the left lower lobe.      Impression:      1.No acute abnormality is identified within the large arteries of the head or neck.  2.No significant stenosis of the bilateral internal carotid arteries.            Electronically Signed: Giovani Lovell MD    5/19/2025 1:40 PM EDT    Workstation ID: [id number]    CT Angiogram Head [[id number]] Collected: 05/19/25 1333     Updated: 05/19/25 1342    Narrative:      CT ANGIOGRAM HEAD, CT ANGIOGRAM NECK    Date of Exam: 5/19/2025 12:50 PM EDT    Indication: Neuro deficit, acute, stroke suspected.    Comparison: Noncontrast head CT from today    Technique: CTA of the head was performed after the uneventful intravenous administration of iodinated contrast. Reconstructed coronal and sagittal images were also obtained. In addition, a 3-D volume rendered image was created for interpretation.   Automated exposure control and iterative reconstruction methods were used.      FINDINGS:    Vascular Findings:    The right common carotid, internal  carotid, middle cerebral, anterior cerebral, vertebral, and posterior cerebral arteries are patent without abrupt cut off or aneurysmal dilation.    The left common carotid, internal carotid, middle cerebral, anterior cerebral, vertebral, and posterior cerebral arteries are patent without abrupt cut off or aneurysmal dilation.    Basilar artery appears patent and appears unremarkable.    Non-vascular Findings:    For description of nonvascular intracranial findings, please refer to the noncontrast head CT performed the same date.    No acute abnormality is identified within the visualized soft tissue or bony structures of the neck.    Calcification is seen within the periphery of the superior segment of the left lower lobe.      Impression:      1.No acute abnormality is identified within the large arteries of the head or neck.  2.No significant stenosis of the bilateral internal carotid arteries.            Electronically Signed: Giovani Lovell MD    5/19/2025 1:40 PM EDT    Workstation ID: [id number]    CT Head Without Contrast Stroke Protocol [[id number]] Collected: 05/19/25 1310     Updated: 05/19/25 1318    Narrative:      CT HEAD WO CONTRAST STROKE PROTOCOL    Date of Exam: 5/19/2025 12:50 PM EDT    Indication: Stroke, follow up  Neuro deficit, acute, stroke suspected.    Comparison: None available.    Technique: Axial CT images were obtained of the head without contrast administration.  Reconstructed coronal and sagittal images were also obtained. Automated exposure control and iterative construction methods were used.    Scan Time: 12:55  Results discussed with Dr. Calderon at 13:15.      Findings:  No acute intracranial hemorrhage or extra-axial collection is identified. The ventricles appear normal in caliber, with no evidence of mass effect or midline shift. The basal cisterns appear patent. The gray-white differentiation appears preserved.    The calvarium appear intact. The paranasal sinuses are clear. The  mastoid air cells are well-aerated.      Impression:      Impression:  No acute intracranial process identified.        Electronically Signed: Flip Saha MD    5/19/2025 1:16 PM EDT    Workstation ID: [id number]    XR Chest 1 View [[id number]] Collected: 05/19/25 1238     Updated: 05/19/25 1241    Narrative:      XR CHEST 1 VW    Date of Exam: 5/19/2025 12:30 PM EDT    Indication: Stroke Protocol (Onset > 12 hrs)    Comparison: CT chest 5/14/2025.    Findings:  No acute airspace disease. Benign calcified granuloma in the left lung. Normal heart size. No pleural effusion or pneumothorax or acute osseous abnormality      Impression:      Impression:  No acute chest finding.      Electronically Signed: Brynn Mensah MD    5/19/2025 12:39 PM EDT    Workstation ID: [id number]             Lab Results (last 24 hours)       Procedure Component Value Units Date/Time    Protime-INR [[id number]]  (Abnormal) Collected: 05/19/25 1223    Specimen: Blood Updated: 05/19/25 1311     Protime 21.7 Seconds      INR 1.88    Comprehensive Metabolic Panel [[id number]]  (Abnormal) Collected: 05/19/25 1223    Specimen: Blood Updated: 05/19/25 1249     Glucose 124 mg/dL      BUN 12 mg/dL      Creatinine 1.21 mg/dL      Sodium 135 mmol/L      Potassium 3.8 mmol/L      Chloride 98 mmol/L      CO2 27.0 mmol/L      Calcium 9.5 mg/dL      Total Protein 7.5 g/dL      Albumin 4.8 g/dL      ALT (SGPT) 18 U/L      AST (SGOT) 22 U/L      Alkaline Phosphatase 90 U/L      Total Bilirubin 0.5 mg/dL      Globulin 2.7 gm/dL      A/G Ratio 1.8 g/dL      BUN/Creatinine Ratio 9.9     Anion Gap 10.0 mmol/L      eGFR 68.5 mL/min/1.73     Narrative:      GFR Categories in Chronic Kidney Disease (CKD)              GFR Category          GFR (mL/min/1.73)    Interpretation  G1                    90 or greater        Normal or high (1)  G2                    60-89                Mild decrease (1)  G3a                   45-59                Mild to moderate  decrease  G3b                   30-44                Moderate to severe decrease  G4                    15-29                Severe decrease  G5                    14 or less           Kidney failure    (1)In the absence of evidence of kidney disease, neither GFR category G1 or G2 fulfill the criteria for CKD.    eGFR calculation 2021 CKD-EPI creatinine equation, which does not include race as a factor    CBC & Differential [[id number]]  (Normal) Collected: 05/19/25 1223    Specimen: Blood Updated: 05/19/25 1232    Narrative:      The following orders were created for panel order CBC & Differential.  Procedure                               Abnormality         Status                     ---------                               -----------         ------                     CBC Auto Differential[[id number]]        Normal              Final result                 Please view results for these tests on the individual orders.    CBC Auto Differential [[id number]]  (Normal) Collected: 05/19/25 1223    Specimen: Blood Updated: 05/19/25 1232     WBC 5.86 10*3/mm3      RBC 4.77 10*6/mm3      Hemoglobin 14.5 g/dL      Hematocrit 43.8 %      MCV 91.8 fL      MCH 30.4 pg      MCHC 33.1 g/dL      RDW 12.6 %      RDW-SD 42.9 fl      MPV 9.2 fL      Platelets 250 10*3/mm3      Neutrophil % 71.0 %      Lymphocyte % 20.0 %      Monocyte % 5.8 %      Eosinophil % 2.0 %      Basophil % 0.7 %      Immature Grans % 0.5 %      Neutrophils, Absolute 4.16 10*3/mm3      Lymphocytes, Absolute 1.17 10*3/mm3      Monocytes, Absolute 0.34 10*3/mm3      Eosinophils, Absolute 0.12 10*3/mm3      Basophils, Absolute 0.04 10*3/mm3      Immature Grans, Absolute 0.03 10*3/mm3      nRBC 0.0 /100 WBC     [place] Draw [[id number]] Collected: 05/19/25 1223    Specimen: Blood Updated: 05/19/25 1230    Narrative:      The following orders were created for panel order [place] Draw.  Procedure                               Abnormality         Status                      ---------                               -----------         ------                     Green Top (Gel)[[id number]]                                  Final result               Lavender Top[[id number]]                                     Final result               Gold Top - SST[[id number]]                                   Final result               Light Blue Top[[id number]]                                   Final result                 Please view results for these tests on the individual orders.    Green Top (Gel) [[id number]] Collected: 05/19/25 1223    Specimen: Blood Updated: 05/19/25 1230     Extra Tube Hold for add-ons.     Comment: Auto resulted.       Lavender Top [[id number]] Collected: 05/19/25 1223    Specimen: Blood Updated: 05/19/25 1230     Extra Tube hold for add-on     Comment: Auto resulted       Gold Top - SST [[id number]] Collected: 05/19/25 1223    Specimen: Blood Updated: 05/19/25 1230     Extra Tube Hold for add-ons.     Comment: Auto resulted.       Light Blue Top [[id number]] Collected: 05/19/25 1223    Specimen: Blood Updated: 05/19/25 1230     Extra Tube Hold for add-ons.     Comment: Auto resulted                I reviewed the patient's new clinical results.    Assessment & Plan       Laryngopharyngeal reflux (LPR)    Essential hypertension    Greater saphenous vein embolism, left    Retinal artery occlusion    Visual disturbance    - Appreciate neurology input, full stroke workup has been ordered  - Continue Xarelto for DVT greater saphenous vein, starter pack started today 5/19/2025 at 15 mg twice a day days, then transition to 20 mg daily.  CT head resulted showing no concerns for bleed  -Consult cardiology for possible DARREN and/or event monitor, n.p.o. after midnight, ECHO was completed 5/15/2025 without bubble study  -Will check ESR, CRP, A1C  -Consult hematology for hypercoagulable workup  -Will start ASA 81 mg, as well as Lipitor for elevated LDL  - Continue home medications for  chronic conditions    Diet: Heart  DVT prophylaxis: Anticoagulated on Xarelto  GI prophylaxis: Pantoprazole    CODE STATUS: Full code  Code status (Patient has no pulse and is not breathing):  Medical Interventions (Patient has pulse or is breathing):  Level of Support Discussed with : Patient    Admission Status: Observation    Expected length of Stay: 1-2 nights      I discussed the patient's findings and my recommendations with patient.     STEVEN Lyons  05/19/25  13:56 EDT

## 2025-05-19 NOTE — CASE MANAGEMENT/SOCIAL WORK
Case Management Discharge Note      Final Note: Home.      Selected Continued Care - Discharged on 5/16/2025 Admission date: 5/14/2025 - Discharge disposition: Home or Self Care       Transportation Services  Private: Car    Final Discharge Disposition Code: 01 - home or self-care

## 2025-05-20 ENCOUNTER — ANESTHESIA EVENT (OUTPATIENT)
Dept: CARDIOLOGY | Facility: HOSPITAL | Age: 60
End: 2025-05-20
Payer: COMMERCIAL

## 2025-05-20 ENCOUNTER — ANESTHESIA (OUTPATIENT)
Dept: CARDIOLOGY | Facility: HOSPITAL | Age: 60
End: 2025-05-20
Payer: COMMERCIAL

## 2025-05-20 ENCOUNTER — APPOINTMENT (OUTPATIENT)
Dept: CARDIOLOGY | Facility: HOSPITAL | Age: 60
End: 2025-05-20
Payer: COMMERCIAL

## 2025-05-20 ENCOUNTER — APPOINTMENT (OUTPATIENT)
Dept: RESPIRATORY THERAPY | Facility: HOSPITAL | Age: 60
End: 2025-05-20
Payer: COMMERCIAL

## 2025-05-20 VITALS
SYSTOLIC BLOOD PRESSURE: 112 MMHG | HEART RATE: 58 BPM | TEMPERATURE: 97.4 F | HEIGHT: 72 IN | RESPIRATION RATE: 15 BRPM | OXYGEN SATURATION: 98 % | WEIGHT: 171.3 LBS | BODY MASS INDEX: 23.2 KG/M2 | DIASTOLIC BLOOD PRESSURE: 73 MMHG

## 2025-05-20 LAB
BH CV ECHO SHUNT ASSESSMENT PERFORMED (HIDDEN SCRIPTING): 1
FOLATE SERPL-MCNC: 8.37 NG/ML (ref 4.78–24.2)
QT INTERVAL: 393 MS
QTC INTERVAL: 459 MS

## 2025-05-20 PROCEDURE — 81241 F5 GENE: CPT | Performed by: INTERNAL MEDICINE

## 2025-05-20 PROCEDURE — 85598 HEXAGNAL PHOSPH PLTLT NEUTRL: CPT | Performed by: INTERNAL MEDICINE

## 2025-05-20 PROCEDURE — 25010000002 LIDOCAINE PF 2% 2 % SOLUTION: Performed by: NURSE ANESTHETIST, CERTIFIED REGISTERED

## 2025-05-20 PROCEDURE — 93246 EXT ECG>7D<15D RECORDING: CPT

## 2025-05-20 PROCEDURE — 25010000002 PHENYLEPHRINE 10 MG/ML SOLUTION: Performed by: NURSE ANESTHETIST, CERTIFIED REGISTERED

## 2025-05-20 PROCEDURE — 25010000002 DIAZEPAM PER 5 MG: Performed by: EMERGENCY MEDICINE

## 2025-05-20 PROCEDURE — 86235 NUCLEAR ANTIGEN ANTIBODY: CPT | Performed by: INTERNAL MEDICINE

## 2025-05-20 PROCEDURE — 86225 DNA ANTIBODY NATIVE: CPT | Performed by: INTERNAL MEDICINE

## 2025-05-20 PROCEDURE — 85613 RUSSELL VIPER VENOM DILUTED: CPT | Performed by: INTERNAL MEDICINE

## 2025-05-20 PROCEDURE — 82746 ASSAY OF FOLIC ACID SERUM: CPT | Performed by: INTERNAL MEDICINE

## 2025-05-20 PROCEDURE — G0378 HOSPITAL OBSERVATION PER HR: HCPCS

## 2025-05-20 PROCEDURE — 85306 CLOT INHIBIT PROT S FREE: CPT | Performed by: INTERNAL MEDICINE

## 2025-05-20 PROCEDURE — 83516 IMMUNOASSAY NONANTIBODY: CPT | Performed by: INTERNAL MEDICINE

## 2025-05-20 PROCEDURE — 85302 CLOT INHIBIT PROT C ANTIGEN: CPT | Performed by: INTERNAL MEDICINE

## 2025-05-20 PROCEDURE — 86146 BETA-2 GLYCOPROTEIN ANTIBODY: CPT | Performed by: INTERNAL MEDICINE

## 2025-05-20 PROCEDURE — 93325 DOPPLER ECHO COLOR FLOW MAPG: CPT

## 2025-05-20 PROCEDURE — 25810000003 SODIUM CHLORIDE 0.9 % SOLUTION: Performed by: NURSE ANESTHETIST, CERTIFIED REGISTERED

## 2025-05-20 PROCEDURE — 93320 DOPPLER ECHO COMPLETE: CPT

## 2025-05-20 PROCEDURE — 86147 CARDIOLIPIN ANTIBODY EA IG: CPT | Performed by: INTERNAL MEDICINE

## 2025-05-20 PROCEDURE — 85670 THROMBIN TIME PLASMA: CPT | Performed by: INTERNAL MEDICINE

## 2025-05-20 PROCEDURE — 96376 TX/PRO/DX INJ SAME DRUG ADON: CPT

## 2025-05-20 PROCEDURE — 85732 THROMBOPLASTIN TIME PARTIAL: CPT | Performed by: INTERNAL MEDICINE

## 2025-05-20 PROCEDURE — 83090 ASSAY OF HOMOCYSTEINE: CPT | Performed by: INTERNAL MEDICINE

## 2025-05-20 PROCEDURE — 25010000002 PROPOFOL 500 MG/50ML EMULSION: Performed by: NURSE ANESTHETIST, CERTIFIED REGISTERED

## 2025-05-20 PROCEDURE — 85705 THROMBOPLASTIN INHIBITION: CPT | Performed by: INTERNAL MEDICINE

## 2025-05-20 PROCEDURE — 86431 RHEUMATOID FACTOR QUANT: CPT | Performed by: INTERNAL MEDICINE

## 2025-05-20 PROCEDURE — 85305 CLOT INHIBIT PROT S TOTAL: CPT | Performed by: INTERNAL MEDICINE

## 2025-05-20 RX ORDER — SODIUM CHLORIDE 9 MG/ML
INJECTION, SOLUTION INTRAVENOUS CONTINUOUS PRN
Status: DISCONTINUED | OUTPATIENT
Start: 2025-05-20 | End: 2025-05-20 | Stop reason: SURG

## 2025-05-20 RX ORDER — DIAZEPAM 2 MG/1
2 TABLET ORAL EVERY 6 HOURS PRN
Status: DISCONTINUED | OUTPATIENT
Start: 2025-05-20 | End: 2025-05-20 | Stop reason: HOSPADM

## 2025-05-20 RX ORDER — ATORVASTATIN CALCIUM 80 MG/1
80 TABLET, FILM COATED ORAL NIGHTLY
Qty: 90 TABLET | Refills: 3 | Status: SHIPPED | OUTPATIENT
Start: 2025-05-20

## 2025-05-20 RX ORDER — PHENYLEPHRINE HYDROCHLORIDE 10 MG/ML
INJECTION INTRAVENOUS AS NEEDED
Status: DISCONTINUED | OUTPATIENT
Start: 2025-05-20 | End: 2025-05-20 | Stop reason: SURG

## 2025-05-20 RX ORDER — PROPOFOL 10 MG/ML
INJECTION, EMULSION INTRAVENOUS AS NEEDED
Status: DISCONTINUED | OUTPATIENT
Start: 2025-05-20 | End: 2025-05-20 | Stop reason: SURG

## 2025-05-20 RX ORDER — ASPIRIN 81 MG/1
81 TABLET ORAL DAILY
Qty: 30 TABLET | Refills: 5 | Status: SHIPPED | OUTPATIENT
Start: 2025-05-21

## 2025-05-20 RX ORDER — LIDOCAINE HYDROCHLORIDE 20 MG/ML
INJECTION, SOLUTION EPIDURAL; INFILTRATION; INTRACAUDAL; PERINEURAL AS NEEDED
Status: DISCONTINUED | OUTPATIENT
Start: 2025-05-20 | End: 2025-05-20 | Stop reason: SURG

## 2025-05-20 RX ADMIN — ASPIRIN 81 MG: 81 TABLET, COATED ORAL at 09:40

## 2025-05-20 RX ADMIN — DIAZEPAM 2 MG: 2 TABLET ORAL at 17:39

## 2025-05-20 RX ADMIN — SODIUM CHLORIDE: 9 INJECTION, SOLUTION INTRAVENOUS at 14:23

## 2025-05-20 RX ADMIN — PROPOFOL 30 MG: 10 INJECTION, EMULSION INTRAVENOUS at 14:31

## 2025-05-20 RX ADMIN — RIVAROXABAN 15 MG: 15 TABLET, FILM COATED ORAL at 09:39

## 2025-05-20 RX ADMIN — LISINOPRIL 10 MG: 5 TABLET ORAL at 09:39

## 2025-05-20 RX ADMIN — Medication 10 ML: at 09:41

## 2025-05-20 RX ADMIN — PHENYLEPHRINE HYDROCHLORIDE 100 MCG: 10 INJECTION INTRAVENOUS at 14:34

## 2025-05-20 RX ADMIN — DIAZEPAM 5 MG: 10 INJECTION, SOLUTION INTRAMUSCULAR; INTRAVENOUS at 10:08

## 2025-05-20 RX ADMIN — PROPOFOL 30 MG: 10 INJECTION, EMULSION INTRAVENOUS at 14:29

## 2025-05-20 RX ADMIN — LIDOCAINE HYDROCHLORIDE 60 MG: 20 INJECTION, SOLUTION EPIDURAL; INFILTRATION; INTRACAUDAL; PERINEURAL at 14:26

## 2025-05-20 RX ADMIN — PROPOFOL 150 MG: 10 INJECTION, EMULSION INTRAVENOUS at 14:26

## 2025-05-20 RX ADMIN — RIVAROXABAN 15 MG: 15 TABLET, FILM COATED ORAL at 17:00

## 2025-05-20 NOTE — SIGNIFICANT NOTE
05/20/25 1344   OTHER   Discipline physical therapist   Rehab Time/Intention   Session Not Performed patient unavailable for evaluation;other (see comments)  (Pt off the unit for DARREN.)   Therapy Assessment/Plan (PT)   Criteria for Skilled Interventions Met (PT) yes;meets criteria   Recommendation   PT - Next Appointment 05/20/25

## 2025-05-20 NOTE — PROGRESS NOTES
LOS: 0 days   Patient Care Team:  Aristeo Rodriguez MD as PCP - General (Family Medicine)  Aristeo Rodriguez MD as PCP - Family Medicine  Aristeo Rodriguez MD    Subjective     Interval History: Stable overnight    Patient Complaints: Persistent left visual field deficit.  No changes.  No new complaints.    History taken from: patient    Review of Systems   Eyes:  Positive for visual disturbance.   All other systems reviewed and are negative.          Objective     Vital Signs  Temp:  [97.4 °F (36.3 °C)-98.8 °F (37.1 °C)] 97.4 °F (36.3 °C)  Heart Rate:  [61-93] 66  Resp:  [13-19] 15  BP: ()/(60-93) 126/71    Physical Exam:     General Appearance:    Alert, cooperative, in no acute distress,   Head:    Normocephalic, without obvious abnormality, atraumatic   Eyes:            Lids and lashes normal, conjunctivae and sclerae normal, no   icterus, no pallor, corneas clear, PERRLA   Ears:    Ears appear intact with no abnormalities noted   Throat:   No oral lesions, no thrush, oral mucosa moist   Neck:   No adenopathy, supple, trachea midline, no thyromegaly, no   carotid bruit, no JVD   Lungs:     Clear to auscultation,respirations regular, even and                  unlabored    Heart:    Regular rhythm and normal rate, normal S1 and S2, no            murmur, no gallop, no rub, no click   Chest Wall:    No abnormalities observed   Abdomen:     Normal bowel sounds, no masses, no organomegaly, soft        Non-tender non-distended, no guarding,   Extremities:   Moves all extremities well, no edema, no cyanosis, no             Redness   Pulses:   Pulses palpable and equal bilaterally   Skin:   No bleeding, bruising or rash   Lymph nodes:   No palpable adenopathy   Neurologic:   Cranial nerves 2 - 12 grossly intact, sensation intact, DTR       present and equal bilaterally        Results Review:    Lab Results (last 24 hours)       Procedure Component Value Units Date/Time    Folate [[id number]] Collected: 05/20/25 1018     Specimen: Blood Updated: 05/20/25 1028    Cardiolipin Antibody [[id number]] Collected: 05/20/25 1018    Specimen: Blood Updated: 05/20/25 1028    Beta-2 Glycoprotein Antibodies [[id number]] Collected: 05/20/25 1018    Specimen: Blood Updated: 05/20/25 1028    Lupus Anticoagulant [[id number]] Collected: 05/20/25 1018    Specimen: Blood Updated: 05/20/25 1028    Basic Metabolic Panel [[id number]]  (Abnormal) Collected: 05/19/25 2216    Specimen: Blood from Arm, Right Updated: 05/19/25 2312     Glucose 107 mg/dL      BUN 13 mg/dL      Creatinine 1.26 mg/dL      Sodium 137 mmol/L      Potassium 3.9 mmol/L      Comment: Specimen hemolyzed.  Result may be falsely elevated.        Chloride 102 mmol/L      CO2 24.7 mmol/L      Calcium 9.0 mg/dL      BUN/Creatinine Ratio 10.3     Anion Gap 10.3 mmol/L      eGFR 65.3 mL/min/1.73     Narrative:      GFR Categories in Chronic Kidney Disease (CKD)              GFR Category          GFR (mL/min/1.73)    Interpretation  G1                    90 or greater        Normal or high (1)  G2                    60-89                Mild decrease (1)  G3a                   45-59                Mild to moderate decrease  G3b                   30-44                Moderate to severe decrease  G4                    15-29                Severe decrease  G5                    14 or less           Kidney failure    (1)In the absence of evidence of kidney disease, neither GFR category G1 or G2 fulfill the criteria for CKD.    eGFR calculation 2021 CKD-EPI creatinine equation, which does not include race as a factor    CBC & Differential [[id number]]  (Normal) Collected: 05/19/25 2216    Specimen: Blood from Arm, Right Updated: 05/19/25 2246    Narrative:      The following orders were created for panel order CBC & Differential.  Procedure                               Abnormality         Status                     ---------                               -----------         ------                      CBC Auto Differential[[id number]]        Normal              Final result                 Please view results for these tests on the individual orders.    CBC Auto Differential [[id number]]  (Normal) Collected: 05/19/25 2216    Specimen: Blood from Arm, Right Updated: 05/19/25 2246     WBC 6.56 10*3/mm3      RBC 4.50 10*6/mm3      Hemoglobin 13.6 g/dL      Hematocrit 40.9 %      MCV 90.9 fL      MCH 30.2 pg      MCHC 33.3 g/dL      RDW 12.7 %      RDW-SD 42.3 fl      MPV 9.3 fL      Platelets 258 10*3/mm3      Neutrophil % 54.2 %      Lymphocyte % 31.1 %      Monocyte % 8.7 %      Eosinophil % 4.9 %      Basophil % 0.8 %      Immature Grans % 0.3 %      Neutrophils, Absolute 3.56 10*3/mm3      Lymphocytes, Absolute 2.04 10*3/mm3      Monocytes, Absolute 0.57 10*3/mm3      Eosinophils, Absolute 0.32 10*3/mm3      Basophils, Absolute 0.05 10*3/mm3      Immature Grans, Absolute 0.02 10*3/mm3      nRBC 0.0 /100 WBC     C-reactive Protein [[id number]]  (Normal) Collected: 05/19/25 2007    Specimen: Blood Updated: 05/19/25 2049     C-Reactive Protein <0.30 mg/dL     T4, Free [[id number]]  (Normal) Collected: 05/19/25 2007    Specimen: Blood Updated: 05/19/25 2049     Free T4 1.30 ng/dL     Sedimentation Rate [[id number]]  (Normal) Collected: 05/19/25 2007    Specimen: Blood Updated: 05/19/25 2024     Sed Rate 3 mm/hr     Vitamin B12 [[id number]]  (Normal) Collected: 05/19/25 1223    Specimen: Blood Updated: 05/19/25 2008     Vitamin B-12 838 pg/mL     Narrative:      Results may be falsely increased if patient taking Biotin.      POC Glucose Once [[id number]]  (Abnormal) Collected: 05/19/25 1839    Specimen: Blood Updated: 05/19/25 1840     Glucose 136 mg/dL      Comment: Serial Number: [id number]:  [id number]       TSH [[id number]]  (Normal) Collected: 05/19/25 1223    Specimen: Blood Updated: 05/19/25 1619     TSH 2.460 uIU/mL     Hemoglobin A1c [[id number]]  (Normal) Collected: 05/19/25 1223    Specimen: Blood Updated:  05/19/25 1613     Hemoglobin A1C 5.37 %     Narrative:      Hemoglobin A1C Ranges:    Increased Risk for Diabetes  5.7% to 6.4%  Diabetes                     >= 6.5%  Diabetic Goal                < 7.0%    Protime-INR [[id number]]  (Abnormal) Collected: 05/19/25 1223    Specimen: Blood Updated: 05/19/25 1311     Protime 21.7 Seconds      INR 1.88    Comprehensive Metabolic Panel [[id number]]  (Abnormal) Collected: 05/19/25 1223    Specimen: Blood Updated: 05/19/25 1249     Glucose 124 mg/dL      BUN 12 mg/dL      Creatinine 1.21 mg/dL      Sodium 135 mmol/L      Potassium 3.8 mmol/L      Chloride 98 mmol/L      CO2 27.0 mmol/L      Calcium 9.5 mg/dL      Total Protein 7.5 g/dL      Albumin 4.8 g/dL      ALT (SGPT) 18 U/L      AST (SGOT) 22 U/L      Alkaline Phosphatase 90 U/L      Total Bilirubin 0.5 mg/dL      Globulin 2.7 gm/dL      A/G Ratio 1.8 g/dL      BUN/Creatinine Ratio 9.9     Anion Gap 10.0 mmol/L      eGFR 68.5 mL/min/1.73     Narrative:      GFR Categories in Chronic Kidney Disease (CKD)              GFR Category          GFR (mL/min/1.73)    Interpretation  G1                    90 or greater        Normal or high (1)  G2                    60-89                Mild decrease (1)  G3a                   45-59                Mild to moderate decrease  G3b                   30-44                Moderate to severe decrease  G4                    15-29                Severe decrease  G5                    14 or less           Kidney failure    (1)In the absence of evidence of kidney disease, neither GFR category G1 or G2 fulfill the criteria for CKD.    eGFR calculation 2021 CKD-EPI creatinine equation, which does not include race as a factor    CBC & Differential [[id number]]  (Normal) Collected: 05/19/25 1223    Specimen: Blood Updated: 05/19/25 1232    Narrative:      The following orders were created for panel order CBC & Differential.  Procedure                               Abnormality         Status                      ---------                               -----------         ------                     CBC Auto Differential[[id number]]        Normal              Final result                 Please view results for these tests on the individual orders.    CBC Auto Differential [[id number]]  (Normal) Collected: 05/19/25 1223    Specimen: Blood Updated: 05/19/25 1232     WBC 5.86 10*3/mm3      RBC 4.77 10*6/mm3      Hemoglobin 14.5 g/dL      Hematocrit 43.8 %      MCV 91.8 fL      MCH 30.4 pg      MCHC 33.1 g/dL      RDW 12.6 %      RDW-SD 42.9 fl      MPV 9.2 fL      Platelets 250 10*3/mm3      Neutrophil % 71.0 %      Lymphocyte % 20.0 %      Monocyte % 5.8 %      Eosinophil % 2.0 %      Basophil % 0.7 %      Immature Grans % 0.5 %      Neutrophils, Absolute 4.16 10*3/mm3      Lymphocytes, Absolute 1.17 10*3/mm3      Monocytes, Absolute 0.34 10*3/mm3      Eosinophils, Absolute 0.12 10*3/mm3      Basophils, Absolute 0.04 10*3/mm3      Immature Grans, Absolute 0.03 10*3/mm3      nRBC 0.0 /100 WBC     The[facility]ia Draw [[id number]] Collected: 05/19/25 1223    Specimen: Blood Updated: 05/19/25 1230    Narrative:      The following orders were created for panel order [facility] Draw.  Procedure                               Abnormality         Status                     ---------                               -----------         ------                     Green Top (Gel)[[id number]]                                  Final result               Lavender Top[[id number]]                                     Final result               Gold Top - SST[[id number]]                                   Final result               Light Blue Top[[id number]]                                   Final result                 Please view results for these tests on the individual orders.    Green Top (Gel) [[id number]] Collected: 05/19/25 1223    Specimen: Blood Updated: 05/19/25 1230     Extra Tube Hold for add-ons.     Comment: Auto resulted.       Lavender  Top [[id number]] Collected: 05/19/25 1223    Specimen: Blood Updated: 05/19/25 1230     Extra Tube hold for add-on     Comment: Auto resulted       Gold Top - SST [[id number]] Collected: 05/19/25 1223    Specimen: Blood Updated: 05/19/25 1230     Extra Tube Hold for add-ons.     Comment: Auto resulted.       Light Blue Top [[id number]] Collected: 05/19/25 1223    Specimen: Blood Updated: 05/19/25 1230     Extra Tube Hold for add-ons.     Comment: Auto resulted                Imaging Results (Last 24 Hours)       Procedure Component Value Units Date/Time    CT Abdomen Pelvis Without Contrast [[id number]] Collected: 05/19/25 1605     Updated: 05/19/25 1610    Narrative:      CT ABDOMEN PELVIS WO CONTRAST    Date of Exam: 5/19/2025 4:03 PM EDT    Indication: rule out malignancy,  hypercoagability.    Comparison: None available.    Technique: Axial CT images were obtained of the abdomen and pelvis without the administration of contrast. Sagittal and coronal reconstructions were performed.  Automated exposure control and iterative reconstruction methods were used.      Findings:  LUNG BASES:  Unremarkable without mass or infiltrate.    LIVER:  Unremarkable parenchyma without focal lesion on unenhanced CT imaging.  BILIARY/GALLBLADDER:  Unremarkable  SPLEEN:  Unremarkable  PANCREAS:  Unremarkable  ADRENAL:  Unremarkable  KIDNEYS:  Unremarkable parenchyma with no solid mass identified on unenhanced CT imaging. No obstruction. There is contrast within the collecting system related to previous contrast exam. No calculus identified.  GASTROINTESTINAL/MESENTERY:  No evidence of obstruction nor inflammation.  The appendix is normal.  AORTA/IVC:  Normal caliber.    RETROPERITONEUM/LYMPH NODES:  Unremarkable    REPRODUCTIVE: There are radiodense foci within the prostate gland. While these may represent calcifications, these may also be a result of therapy. Correlate with history.  BLADDER:  Unremarkable    OSSEUS STRUCTURES:  Typical  for age with no acute process identified.      Impression:      Impression:  1.No acute abdominal or pelvic abnormality identified on unenhanced CT imaging.  2.No evidence of malignancy on unenhanced CT imaging.  3.Radiodense foci within the prostate gland. While these may represent calcifications, these may also be a result of therapy. Correlate with history.          Electronically Signed: Brian Vargas MD    5/19/2025 4:08 PM EDT    Workstation ID: [id number]    MRI Brain Without Contrast [[id number]] Collected: 05/19/25 1451     Updated: 05/19/25 1456    Narrative:      MRI BRAIN WO CONTRAST    Date of Exam: 5/19/2025 2:50 PM EDT    Indication: Stroke evaluation, central retinal artery occlusion of left eye.     Comparison: CT angiography of the head and neck 5/19/2025. CT head without contrast 5/19/2025.    Technique:  Routine multiplanar/multisequence sequence images of the brain were obtained without contrast administration.      Findings:  There is no restricted diffusion or evidence of acute or subacute ischemic insult. Major vascular flow voids are preserved. No significant FLAIR or T2 signal intensity changes are identified. Ventricular configuration is normal. No Chiari malformation.   Imaged portion of cervical spinal cord is unremarkable. No intracranial hemorrhage, mass lesion, mass effect or midline shift is seen. Orbital structures appear unremarkable. Paranasal sinuses and mastoid air cells are clear. Calvarium is unremarkable.      Impression:      Impression:  Normal MR brain without contrast.        Electronically Signed: Brynn Mensah MD    5/19/2025 2:54 PM EDT    Workstation ID: [id number]    CT Angiogram Neck [[id number]] Collected: 05/19/25 1333     Updated: 05/19/25 1342    Narrative:      CT ANGIOGRAM HEAD, CT ANGIOGRAM NECK    Date of Exam: 5/19/2025 12:50 PM EDT    Indication: Neuro deficit, acute, stroke suspected.    Comparison: Noncontrast head CT from today    Technique: CTA of the head  was performed after the uneventful intravenous administration of iodinated contrast. Reconstructed coronal and sagittal images were also obtained. In addition, a 3-D volume rendered image was created for interpretation.   Automated exposure control and iterative reconstruction methods were used.      FINDINGS:    Vascular Findings:    The right common carotid, internal carotid, middle cerebral, anterior cerebral, vertebral, and posterior cerebral arteries are patent without abrupt cut off or aneurysmal dilation.    The left common carotid, internal carotid, middle cerebral, anterior cerebral, vertebral, and posterior cerebral arteries are patent without abrupt cut off or aneurysmal dilation.    Basilar artery appears patent and appears unremarkable.    Non-vascular Findings:    For description of nonvascular intracranial findings, please refer to the noncontrast head CT performed the same date.    No acute abnormality is identified within the visualized soft tissue or bony structures of the neck.    Calcification is seen within the periphery of the superior segment of the left lower lobe.      Impression:      1.No acute abnormality is identified within the large arteries of the head or neck.  2.No significant stenosis of the bilateral internal carotid arteries.            Electronically Signed: Giovani Lovell MD    5/19/2025 1:40 PM EDT    Workstation ID: [id number]    CT Angiogram Head [[id number]] Collected: 05/19/25 1333     Updated: 05/19/25 1342    Narrative:      CT ANGIOGRAM HEAD, CT ANGIOGRAM NECK    Date of Exam: 5/19/2025 12:50 PM EDT    Indication: Neuro deficit, acute, stroke suspected.    Comparison: Noncontrast head CT from today    Technique: CTA of the head was performed after the uneventful intravenous administration of iodinated contrast. Reconstructed coronal and sagittal images were also obtained. In addition, a 3-D volume rendered image was created for interpretation.   Automated exposure control  and iterative reconstruction methods were used.      FINDINGS:    Vascular Findings:    The right common carotid, internal carotid, middle cerebral, anterior cerebral, vertebral, and posterior cerebral arteries are patent without abrupt cut off or aneurysmal dilation.    The left common carotid, internal carotid, middle cerebral, anterior cerebral, vertebral, and posterior cerebral arteries are patent without abrupt cut off or aneurysmal dilation.    Basilar artery appears patent and appears unremarkable.    Non-vascular Findings:    For description of nonvascular intracranial findings, please refer to the noncontrast head CT performed the same date.    No acute abnormality is identified within the visualized soft tissue or bony structures of the neck.    Calcification is seen within the periphery of the superior segment of the left lower lobe.      Impression:      1.No acute abnormality is identified within the large arteries of the head or neck.  2.No significant stenosis of the bilateral internal carotid arteries.            Electronically Signed: Giovani Lovell MD    5/19/2025 1:40 PM EDT    Workstation ID: [id number]    CT Head Without Contrast Stroke Protocol [[id number]] Collected: 05/19/25 1310     Updated: 05/19/25 1318    Narrative:      CT HEAD WO CONTRAST STROKE PROTOCOL    Date of Exam: 5/19/2025 12:50 PM EDT    Indication: Stroke, follow up  Neuro deficit, acute, stroke suspected.    Comparison: None available.    Technique: Axial CT images were obtained of the head without contrast administration.  Reconstructed coronal and sagittal images were also obtained. Automated exposure control and iterative construction methods were used.    Scan Time: 12:55  Results discussed with Dr. Calderon at 13:15.      Findings:  No acute intracranial hemorrhage or extra-axial collection is identified. The ventricles appear normal in caliber, with no evidence of mass effect or midline shift. The basal cisterns appear  patent. The gray-white differentiation appears preserved.    The calvarium appear intact. The paranasal sinuses are clear. The mastoid air cells are well-aerated.      Impression:      Impression:  No acute intracranial process identified.        Electronically Signed: Flip Saha MD    5/19/2025 1:16 PM EDT    Workstation ID: [id number]    XR Chest 1 View [[id number]] Collected: 05/19/25 1238     Updated: 05/19/25 1241    Narrative:      XR CHEST 1 VW    Date of Exam: 5/19/2025 12:30 PM EDT    Indication: Stroke Protocol (Onset > 12 hrs)    Comparison: CT chest 5/14/2025.    Findings:  No acute airspace disease. Benign calcified granuloma in the left lung. Normal heart size. No pleural effusion or pneumothorax or acute osseous abnormality      Impression:      Impression:  No acute chest finding.      Electronically Signed: Brynn Mensah MD    5/19/2025 12:39 PM EDT    Workstation ID: [id number]                 I reviewed the patient's new clinical results.    Medication Review:   Scheduled Meds:aspirin, 81 mg, Oral, Daily  atorvastatin, 80 mg, Oral, Nightly  lisinopril, 10 mg, Oral, Daily  pantoprazole, 40 mg, Oral, Q AM  rivaroxaban, 15 mg, Oral, BID With Meals  sodium chloride, 10 mL, Intravenous, Q12H      Continuous Infusions:   PRN Meds:.  Calcium Replacement - Follow Nurse / BPA Driven Protocol    diazePAM    hydrALAZINE    Magnesium Standard Dose Replacement - Follow Nurse / BPA Driven Protocol    ondansetron    Phosphorus Replacement - Follow Nurse / BPA Driven Protocol    Potassium Replacement - Follow Nurse / BPA Driven Protocol    sodium chloride    sodium chloride    sodium chloride     Assessment & Plan       Retinal artery occlusion    Laryngopharyngeal reflux (LPR)    Essential hypertension    Greater saphenous vein embolism, left    Visual disturbance    Hyperlipidemia    -Workup for retinal artery occlusion is in progress.  May be related to embolic phenomenon from arterial plaque during heart  cath, but there is concern for possible underlying clotting disorder given the greater saphenous DVT.  Workup for hypercoagulable state is in progress.  - DARREN to rule out PFO and cardiac thrombus  - Event monitor at discharge  - Xarelto for DVT  - Aspirin, statin for vertebral artery occlusion  - OT evaluation for visual field deficits  - Advised patient will need to follow-up with ophthalmologist to clarify driving restrictions based on visual field deficit.  He should not return to work () until cleared by ophthalmologist    CODE Status:    Code status (Patient has no pulse and is not breathing):  CPR (Attempt to Resuscitate)  Medical Interventions (Patient has pulse or is breathing):  Full support  Level of support discussed with:  Patient    Admission status:  I believe this patient meets observation status  Expected length of stay:  1 midnights or greater  I discussed the patient's findings and my recommendations with the patient.    Plan for disposition: Home at discharge    Cierra Cueva MD  05/20/25  11:43 EDT

## 2025-05-20 NOTE — PLAN OF CARE
Problem: Adult Inpatient Plan of Care  Goal: Plan of Care Review  Outcome: Progressing  Goal: Patient-Specific Goal (Individualized)  Outcome: Progressing  Goal: Absence of Hospital-Acquired Illness or Injury  Outcome: Progressing  Goal: Optimal Comfort and Wellbeing  Outcome: Progressing  Goal: Readiness for Transition of Care  Outcome: Progressing  Intervention: Mutually Develop Transition Plan  Recent Flowsheet Documentation  Taken 5/19/2025 2136 by Anika Avila LPN  Transportation Anticipated: family or friend will provide  Patient/Family Anticipated Services at Transition: none  Patient/Family Anticipates Transition to: home  Taken 5/19/2025 2134 by Anika Avila LPN  Equipment Currently Used at Home: none     Problem: Comorbidity Management  Goal: Blood Pressure in Desired Range  Outcome: Progressing   Goal Outcome Evaluation:

## 2025-05-20 NOTE — PROGRESS NOTES
Patient doing very well  Stable  No vision improvement or worsening  DARREN today  Treatment regarding anticoagulation to be decided by cardiology if there is any reason or hematology and it looks like hematology is considering no long-term anticoagulation so the patient may benefit from aspirin otherwise    Follow-up with ophthalmology    Modification of stroke risk factors:   - Blood pressure should be less than 130/80 outpatient, HbA1c less than 6.5, LDL less than 70; b12>500 and smoking cessation if applicable. We would be grateful if the primary team / primary care physician keep a close watch on this above targets.  - Stroke education  - Follow up with neurologist of choice

## 2025-05-20 NOTE — PLAN OF CARE
Problem: Adult Inpatient Plan of Care  Goal: Plan of Care Review  Outcome: Progressing  Goal: Patient-Specific Goal (Individualized)  Outcome: Progressing  Goal: Absence of Hospital-Acquired Illness or Injury  Outcome: Progressing  Intervention: Identify and Manage Fall Risk  Recent Flowsheet Documentation  Taken 5/20/2025 1400 by aMrtín Sykes RN  Safety Promotion/Fall Prevention: patient off unit  Taken 5/20/2025 1200 by Martín Sykes RN  Safety Promotion/Fall Prevention: safety round/check completed  Taken 5/20/2025 1000 by Martín Sykes RN  Safety Promotion/Fall Prevention: safety round/check completed  Taken 5/20/2025 0800 by Martín Sykes RN  Safety Promotion/Fall Prevention: safety round/check completed  Intervention: Prevent Skin Injury  Recent Flowsheet Documentation  Taken 5/20/2025 1200 by Martín Sykes RN  Body Position:   heels elevated   neutral head position   neutral body alignment   position changed independently  Taken 5/20/2025 0800 by Martín Sykes RN  Body Position: position changed independently  Intervention: Prevent and Manage VTE (Venous Thromboembolism) Risk  Recent Flowsheet Documentation  Taken 5/20/2025 0800 by Martín Sykes RN  VTE Prevention/Management: patient refused intervention  Goal: Optimal Comfort and Wellbeing  Outcome: Progressing  Intervention: Provide Person-Centered Care  Recent Flowsheet Documentation  Taken 5/20/2025 1200 by Martín Sykes RN  Trust Relationship/Rapport: care explained  Taken 5/20/2025 0800 by Martín Sykes RN  Trust Relationship/Rapport: care explained  Goal: Readiness for Transition of Care  Outcome: Progressing  Goal: Plan of Care Review  Outcome: Progressing  Goal: Patient-Specific Goal (Individualized)  Outcome: Progressing  Goal: Absence of Hospital-Acquired Illness or Injury  Outcome: Progressing  Intervention: Identify and Manage Fall Risk  Recent Flowsheet Documentation  Taken 5/20/2025 1400 by Martín Sykes RN  Safety  Promotion/Fall Prevention: patient off unit  Taken 5/20/2025 1200 by Martín Sykes RN  Safety Promotion/Fall Prevention: safety round/check completed  Taken 5/20/2025 1000 by Martín Sykes RN  Safety Promotion/Fall Prevention: safety round/check completed  Taken 5/20/2025 0800 by Martín Sykes RN  Safety Promotion/Fall Prevention: safety round/check completed  Intervention: Prevent Skin Injury  Recent Flowsheet Documentation  Taken 5/20/2025 1200 by Martín Sykes RN  Body Position:   heels elevated   neutral head position   neutral body alignment   position changed independently  Taken 5/20/2025 0800 by Martín Sykes RN  Body Position: position changed independently  Intervention: Prevent and Manage VTE (Venous Thromboembolism) Risk  Recent Flowsheet Documentation  Taken 5/20/2025 0800 by Martín Sykes RN  VTE Prevention/Management: patient refused intervention  Goal: Optimal Comfort and Wellbeing  Outcome: Progressing  Intervention: Provide Person-Centered Care  Recent Flowsheet Documentation  Taken 5/20/2025 1200 by Martín Sykes RN  Trust Relationship/Rapport: care explained  Taken 5/20/2025 0800 by Martín Sykes RN  Trust Relationship/Rapport: care explained  Goal: Readiness for Transition of Care  Outcome: Progressing     Problem: Comorbidity Management  Goal: Blood Pressure in Desired Range  Outcome: Progressing     Problem: Stroke, Ischemic (Includes Transient Ischemic Attack)  Goal: Optimal Coping  Outcome: Progressing  Intervention: Support Psychosocial Response to Stroke  Recent Flowsheet Documentation  Taken 5/20/2025 1200 by Martín Sykes RN  Family/Support System Care: self-care encouraged  Taken 5/20/2025 0800 by Martín Sykes RN  Family/Support System Care: self-care encouraged  Goal: Effective Bowel Elimination  Outcome: Progressing  Intervention: Promote Effective Bowel Elimination  Recent Flowsheet Documentation  Taken 5/20/2025 0800 by Martín Sykes RN  Bowel Elimination  Management: toileting offered  Goal: Optimal Cerebral Tissue Perfusion  Outcome: Progressing  Goal: Optimal Cognitive Function  Outcome: Progressing  Goal: Improved Communication Skills  Outcome: Progressing  Goal: Optimal Functional Ability  Outcome: Progressing  Goal: Optimal Nutrition Intake  Outcome: Progressing  Goal: Effective Oxygenation and Ventilation  Outcome: Progressing  Intervention: Optimize Oxygenation and Ventilation  Recent Flowsheet Documentation  Taken 5/20/2025 1200 by Martín Sykes, RN  Head of Bed (HOB) Positioning: HOB at 30-45 degrees  Taken 5/20/2025 0800 by Martín Sykes, RN  Head of Bed (HOB) Positioning: HOB elevated  Goal: Improved Sensorimotor Function  Outcome: Progressing  Goal: Safe and Effective Swallow  Outcome: Progressing  Goal: Effective Urinary Elimination  Outcome: Progressing   Goal Outcome Evaluation:   DARREN today

## 2025-05-20 NOTE — ANESTHESIA PREPROCEDURE EVALUATION
Anesthesia Evaluation     Patient summary reviewed and Nursing notes reviewed   NPO Solid Status: > 8 hours  NPO Liquid Status: > 8 hours           Airway   Mallampati: II  TM distance: >3 FB  Neck ROM: full  No difficulty expected  Dental - normal exam     Pulmonary - normal exam    breath sounds clear to auscultation  (+) ,shortness of breath  Cardiovascular - normal exam  Exercise tolerance: unable to assess    ECG reviewed  Rhythm: regular  Rate: normal    (+) hypertension, hyperlipidemia      Neuro/Psych- negative ROS  GI/Hepatic/Renal/Endo - negative ROS     Musculoskeletal (-) negative ROS    Abdominal  - normal exam   Substance History - negative use     OB/GYN negative ob/gyn ROS         Other - negative ROS       ROS/Med Hx Other: Assessment & Plan    Retinal artery occlusion    Laryngopharyngeal reflux (LPR)    Essential hypertension    Greater saphenous vein embolism, left    Visual disturbance    Hyperlipidemia     -Workup for retinal artery occlusion is in progress.  May be related to embolic phenomenon from arterial plaque during heart cath, but there is concern for possible underlying clotting disorder given the greater saphenous DVT.  Workup for hypercoagulable state is in progress.  - DARREN to rule out PFO and cardiac thrombus  - Event monitor at discharge  - Xarelto for DVT  - Aspirin, statin for vertebral artery occlusion  - OT evaluation for visual field deficits  - Advised patient will need to follow-up with ophthalmologist to clarify driving restrictions based on visual field deficit.  He should not return to work () until cleared by ophthalmologist     CODE Status:    Code status (Patient has no pulse and is not breathing):  CPR (Attempt to Resuscitate)  Medical Interventions (Patient has pulse or is breathing):  Full support  Level of support discussed with:  Patient     Admission status:  I believe this patient meets observation status  Expected length of stay:  1 midnights or  greater  I discussed the patient's findings and my recommendations with the patient.     Plan for disposition: Home at discharge     Cierra Cueva,                       Anesthesia Plan    ASA 3     general     intravenous induction     Anesthetic plan, risks, benefits, and alternatives have been provided, discussed and informed consent has been obtained with: patient.        CODE STATUS:    Code Status (Patient has no pulse and is not breathing): CPR (Attempt to Resuscitate)  Medical Interventions (Patient has pulse or is breathing): Full Support

## 2025-05-20 NOTE — DISCHARGE INSTRUCTIONS
DARREN DC Instructions  The medication, which was used to put you to sleep, will be acting in your body for the next twenty-four (24) hours, so you might feel a little sleepy; this feeling will slowly wear off.  Because the medicine is still in your system for the next twenty-four (24) hours:  Do not drive a car, operate machinery, or power tools  Do not drink any alcoholic drinks (not even beer)  Make any important decisions such as to sign important papers    We strongly suggest that a responsible adult be with the patient the rest of the day.    What to do for pain: acetaminophen (Tylenol) and eating cool, soothing foods (e.g. ice cream, smoothies) can help with a sore throat. If your pain is unmanageable with these methods, call the Cardiologist's office.     It may be better to start with liquids such as water, then soups, and graduate up to solid foods  If medication was used to numb your throat, do not eat or drink anything for 2 hours.     Call the cardiologist with any problems of:  Excessive mucus  Spitting up blood  Sore throat more than 72 hours    Go to the nearest Emergency Department if you're vomiting blood or having difficulty breathing.

## 2025-05-20 NOTE — DISCHARGE SUMMARY
Date of Discharge:  5/20/2025    Discharge Diagnosis:   **Retinal artery occlusion [H34.9]   Visual disturbance [H53.9]   Hyperlipidemia [E78.5]   Greater saphenous vein embolism, left [I82.812]   Essential hypertension [I10]       Presenting Problem/History of Present Illness  Active Hospital Problems    Diagnosis  POA    **Retinal artery occlusion [H34.9]  Yes    Visual disturbance [H53.9]  Yes    Hyperlipidemia [E78.5]  Yes    Greater saphenous vein embolism, left [I82.812]  Yes    Essential hypertension [I10]  Yes      Resolved Hospital Problems   No resolved problems to display.          Hospital Course  Patient is a 59 y.o. male with recent hospitalization for chest pain and dyspnea who re-presented with vision loss in left eye x 2-3 days. Exam by ophthalmologist showed retinal artery occlusion. He was admitted for further workup.    Last week, he had heart cath on 5/16 through radial approach.  No significant CAD was found.  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  He was discharged home on Xarelto. He had no h/o prior dvt or other clotting issues.      Workup during this hospitalization included DARREN - no PFO or cardiac thrombus.  He was seen by Dr. Kearney with hematology, who initiated a thrombophilic workup.  He was stable with no change in vision deficit.  He will start 81 mg asa, high dose statin and continue Xarelto.  He will follow up promptly with hematology and pcp.  He is being discharged with a 14 day Holter monitor.    Retinal artery embolism is possibly due to heart cath with radial approach and is likely unrelated to venous thrombophlebitis.  If the thrombophilia workup is unremarkable and there is no evidence of afib on Holter, he will likely only need a short course of antioagulation.  Neurologist recommends that if Xarelto is discontinued, he should continue on low dose aspirin and statin.    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  He has been advised not to return to work until cleared by ophthalmologist.    Procedures Performed         Consults:   Consults       Date and Time Order Name Status Description    5/19/2025  3:40 PM Hematology & Oncology Inpatient Consult Completed     5/19/2025  3:40 PM Inpatient Cardiology Consult      5/19/2025 12:59 PM Family Medicine Consult      5/19/2025 12:16 PM Inpatient Neurology Consult Stroke Completed     5/15/2025  9:11 AM Inpatient Cardiology Consult Completed             Pertinent Test Results:    Lab Results (most recent)       Procedure Component Value Units Date/Time    Folate [[id number]]  (Normal) Collected: 05/20/25 1018    Specimen: Blood Updated: 05/20/25 1627     Folate 8.37 ng/mL     Narrative:      Results may be falsely increased if patient taking Biotin.      Cardiolipin Antibody [[id number]] Collected: 05/20/25 1018    Specimen: Blood Updated: 05/20/25 1028    Beta-2 Glycoprotein Antibodies [[id number]] Collected: 05/20/25 1018    Specimen: Blood Updated: 05/20/25 1028    Lupus Anticoagulant [[id number]] Collected: 05/20/25 1018    Specimen: Blood Updated: 05/20/25 1028    Basic Metabolic Panel [[id number]]  (Abnormal) Collected: 05/19/25 2216    Specimen: Blood from Arm, Right Updated: 05/19/25 2312     Glucose 107 mg/dL      BUN 13 mg/dL      Creatinine 1.26 mg/dL      Sodium 137 mmol/L      Potassium 3.9 mmol/L      Comment: Specimen hemolyzed.  Result may be falsely elevated.        Chloride 102 mmol/L      CO2 24.7 mmol/L      Calcium 9.0 mg/dL      BUN/Creatinine Ratio 10.3     Anion Gap 10.3 mmol/L      eGFR 65.3 mL/min/1.73     Narrative:      GFR Categories in Chronic Kidney Disease (CKD)              GFR Category          GFR (mL/min/1.73)    Interpretation  G1                    90 or greater        Normal or high (1)  G2                    60-89                Mild decrease (1)  G3a                   45-59                Mild to moderate decrease  G3b                    30-44                Moderate to severe decrease  G4                    15-29                Severe decrease  G5                    14 or less           Kidney failure    (1)In the absence of evidence of kidney disease, neither GFR category G1 or G2 fulfill the criteria for CKD.    eGFR calculation 2021 CKD-EPI creatinine equation, which does not include race as a factor    CBC & Differential [[id number]]  (Normal) Collected: 05/19/25 2216    Specimen: Blood from Arm, Right Updated: 05/19/25 2246    Narrative:      The following orders were created for panel order CBC & Differential.  Procedure                               Abnormality         Status                     ---------                               -----------         ------                     CBC Auto Differential[[id number]]        Normal              Final result                 Please view results for these tests on the individual orders.    CBC Auto Differential [[id number]]  (Normal) Collected: 05/19/25 2216    Specimen: Blood from Arm, Right Updated: 05/19/25 2246     WBC 6.56 10*3/mm3      RBC 4.50 10*6/mm3      Hemoglobin 13.6 g/dL      Hematocrit 40.9 %      MCV 90.9 fL      MCH 30.2 pg      MCHC 33.3 g/dL      RDW 12.7 %      RDW-SD 42.3 fl      MPV 9.3 fL      Platelets 258 10*3/mm3      Neutrophil % 54.2 %      Lymphocyte % 31.1 %      Monocyte % 8.7 %      Eosinophil % 4.9 %      Basophil % 0.8 %      Immature Grans % 0.3 %      Neutrophils, Absolute 3.56 10*3/mm3      Lymphocytes, Absolute 2.04 10*3/mm3      Monocytes, Absolute 0.57 10*3/mm3      Eosinophils, Absolute 0.32 10*3/mm3      Basophils, Absolute 0.05 10*3/mm3      Immature Grans, Absolute 0.02 10*3/mm3      nRBC 0.0 /100 WBC     C-reactive Protein [[id number]]  (Normal) Collected: 05/19/25 2007    Specimen: Blood Updated: 05/19/25 2049     C-Reactive Protein <0.30 mg/dL     T4, Free [[id number]]  (Normal) Collected: 05/19/25 2007    Specimen: Blood Updated: 05/19/25  2049     Free T4 1.30 ng/dL     Sedimentation Rate [[id number]]  (Normal) Collected: 05/19/25 2007    Specimen: Blood Updated: 05/19/25 2024     Sed Rate 3 mm/hr     Vitamin B12 [[id number]]  (Normal) Collected: 05/19/25 1223    Specimen: Blood Updated: 05/19/25 2008     Vitamin B-12 838 pg/mL     Narrative:      Results may be falsely increased if patient taking Biotin.      POC Glucose Once [[id number]]  (Abnormal) Collected: 05/19/25 1839    Specimen: Blood Updated: 05/19/25 1840     Glucose 136 mg/dL      Comment: Serial Number: [id number]:  [id number]       TSH [[id number]]  (Normal) Collected: 05/19/25 1223    Specimen: Blood Updated: 05/19/25 1619     TSH 2.460 uIU/mL     Hemoglobin A1c [[id number]]  (Normal) Collected: 05/19/25 1223    Specimen: Blood Updated: 05/19/25 1613     Hemoglobin A1C 5.37 %     Narrative:      Hemoglobin A1C Ranges:    Increased Risk for Diabetes  5.7% to 6.4%  Diabetes                     >= 6.5%  Diabetic Goal                < 7.0%    Protime-INR [[id number]]  (Abnormal) Collected: 05/19/25 1223    Specimen: Blood Updated: 05/19/25 1311     Protime 21.7 Seconds      INR 1.88    Comprehensive Metabolic Panel [[id number]]  (Abnormal) Collected: 05/19/25 1223    Specimen: Blood Updated: 05/19/25 1249     Glucose 124 mg/dL      BUN 12 mg/dL      Creatinine 1.21 mg/dL      Sodium 135 mmol/L      Potassium 3.8 mmol/L      Chloride 98 mmol/L      CO2 27.0 mmol/L      Calcium 9.5 mg/dL      Total Protein 7.5 g/dL      Albumin 4.8 g/dL      ALT (SGPT) 18 U/L      AST (SGOT) 22 U/L      Alkaline Phosphatase 90 U/L      Total Bilirubin 0.5 mg/dL      Globulin 2.7 gm/dL      A/G Ratio 1.8 g/dL      BUN/Creatinine Ratio 9.9     Anion Gap 10.0 mmol/L      eGFR 68.5 mL/min/1.73     Narrative:      GFR Categories in Chronic Kidney Disease (CKD)              GFR Category          GFR (mL/min/1.73)    Interpretation  G1                    90 or greater        Normal or high (1)  G2                     60-89                Mild decrease (1)  G3a                   45-59                Mild to moderate decrease  G3b                   30-44                Moderate to severe decrease  G4                    15-29                Severe decrease  G5                    14 or less           Kidney failure    (1)In the absence of evidence of kidney disease, neither GFR category G1 or G2 fulfill the criteria for CKD.    eGFR calculation 2021 CKD-EPI creatinine equation, which does not include race as a factor    CBC & Differential [[id number]]  (Normal) Collected: 05/19/25 1223    Specimen: Blood Updated: 05/19/25 1232    Narrative:      The following orders were created for panel order CBC & Differential.  Procedure                               Abnormality         Status                     ---------                               -----------         ------                     CBC Auto Differential[[id number]]        Normal              Final result                 Please view results for these tests on the individual orders.    CBC Auto Differential [[id number]]  (Normal) Collected: 05/19/25 1223    Specimen: Blood Updated: 05/19/25 1232     WBC 5.86 10*3/mm3      RBC 4.77 10*6/mm3      Hemoglobin 14.5 g/dL      Hematocrit 43.8 %      MCV 91.8 fL      MCH 30.4 pg      MCHC 33.1 g/dL      RDW 12.6 %      RDW-SD 42.9 fl      MPV 9.2 fL      Platelets 250 10*3/mm3      Neutrophil % 71.0 %      Lymphocyte % 20.0 %      Monocyte % 5.8 %      Eosinophil % 2.0 %      Basophil % 0.7 %      Immature Grans % 0.5 %      Neutrophils, Absolute 4.16 10*3/mm3      Lymphocytes, Absolute 1.17 10*3/mm3      Monocytes, Absolute 0.34 10*3/mm3      Eosinophils, Absolute 0.12 10*3/mm3      Basophils, Absolute 0.04 10*3/mm3      Immature Grans, Absolute 0.03 10*3/mm3      nRBC 0.0 /100 WBC     [place] Draw [[id number]] Collected: 05/19/25 1223    Specimen: Blood Updated: 05/19/25 1230    Narrative:      The following orders were created for panel  order [place] Draw.  Procedure                               Abnormality         Status                     ---------                               -----------         ------                     Green Top (Gel)[[id number]]                                  Final result               Lavender Top[[id number]]                                     Final result               Gold Top - SST[[id number]]                                   Final result               Light Blue Top[[id number]]                                   Final result                 Please view results for these tests on the individual orders.    Green Top (Gel) [[id number]] Collected: 05/19/25 1223    Specimen: Blood Updated: 05/19/25 1230     Extra Tube Hold for add-ons.     Comment: Auto resulted.       Lavender Top [[id number]] Collected: 05/19/25 1223    Specimen: Blood Updated: 05/19/25 1230     Extra Tube hold for add-on     Comment: Auto resulted       Gold Top - SST [[id number]] Collected: 05/19/25 1223    Specimen: Blood Updated: 05/19/25 1230     Extra Tube Hold for add-ons.     Comment: Auto resulted.       Light Blue Top [[id number]] Collected: 05/19/25 1223    Specimen: Blood Updated: 05/19/25 1230     Extra Tube Hold for add-ons.     Comment: Auto resulted                Results for orders placed during the hospital encounter of 05/14/25    Adult Transthoracic Echo Complete w/ Color, Spectral and Contrast if necessary per protocol    Interpretation Summary    Left ventricular systolic function is normal. Calculated left ventricular EF = 55.3%    Left ventricular diastolic function was normal.    Estimated right ventricular systolic pressure from tricuspid regurgitation is normal (<35 mmHg).              Condition on Discharge:  Stable    Vital Signs  Temp:  [97.4 °F (36.3 °C)-97.8 °F (36.6 °C)] 97.4 °F (36.3 °C)  Heart Rate:  [58-77] 58  Resp:  [13-19] 15  BP: ()/(58-78) 112/73    Physical Exam:     General Appearance:    Alert, cooperative,  in no acute distress   Head:    Normocephalic, without obvious abnormality, atraumatic   Eyes:            Lids and lashes normal, conjunctivae and sclerae normal, no   icterus, no pallor, corneas clear, PERRLA   Ears:    Ears appear intact with no abnormalities noted   Throat:   No oral lesions, no thrush, oral mucosa moist   Neck:   No adenopathy, supple, trachea midline, no thyromegaly, no   carotid bruit, no JVD   Lungs:     Clear to auscultation,respirations regular, even and                  unlabored    Heart:    Regular rhythm and normal rate, normal S1 and S2, no            murmur, no gallop, no rub, no click   Chest Wall:    No abnormalities observed   Abdomen:     Normal bowel sounds, no masses, no organomegaly, soft        non-tender, non-distended, no guarding, no rebound                tenderness   Extremities:   Moves all extremities well, no edema, no cyanosis, no             redness   Pulses:   Pulses palpable and equal bilaterally   Skin:   No bleeding, bruising or rash   Lymph nodes:   No palpable adenopathy   Neurologic:   Cranial nerves 2 - 12 grossly intact, sensation intact, DTR       present and equal bilaterally       Discharge Disposition  Home or Self Care    Discharge Medications     Discharge Medications        New Medications        Instructions Start Date   aspirin 81 MG EC tablet   81 mg, Oral, Daily   Start Date: May 21, 2025     atorvastatin 80 MG tablet  Commonly known as: LIPITOR   80 mg, Oral, Nightly             Continue These Medications        Instructions Start Date   acetaminophen 325 MG tablet  Commonly known as: TYLENOL   650 mg, Every 6 Hours PRN      omeprazole 20 MG capsule  Commonly known as: priLOSEC   20 mg, Oral, Daily      rivaroxaban 15 MG tablet  Commonly known as: XARELTO   15 mg, 2 Times Daily With Meals      rivaroxaban 20 MG tablet  Commonly known as: XARELTO   20 mg, Oral, Daily   Start Date: June 9, 2025            Stop These Medications      Lidocaine  Viscous HCl 2 % solution  Commonly known as: XYLOCAINE     lisinopril 10 MG tablet  Commonly known as: PRINIVIL,ZESTRIL              Discharge Diet: Healthy heart    Activity at Discharge:   Activity Instructions       Other Activity Instructions      Activity Instructions: Do not drive until cleared by ophthalmologist.            Follow-up Appointments  No future appointments.  Additional Instructions for the Follow-ups that You Need to Schedule       Discharge Follow-up with PCP   As directed       Currently Documented PCP:    Aristeo Rodriguez MD    PCP Phone Number:    [phone]     Follow Up Details: Call office to schedule a prompt hospital follow up appointment        Discharge Follow-up with Specified Provider: Dr. Kearney - hematologist - 3 to 4 weeks   As directed      To: Dr. Caio Perez hematologist - 3 to 4 weeks        Discharge Follow-up with Specified Provider: Ophthalmologist - ASAP   As directed      To: Ophthalmologist - ASAP                Test Results Pending at Discharge  Pending Results       Procedure [Order ID] Specimen - Date/Time    ALEXANDER Comprehensive Plus Profile [[id number]]     Specimen: Blood     Adult Transesophageal Echo (DARREN) W/ Cont if Necessary Per Protocol (Cardiology Department) [[id number]] Resulted: 05/20/25 1503     Updated: 05/20/25 1503      CV ECHO SHUNT ASSESSMENT PERFORMED (HIDDEN SCRIPTING) 1    Beta-2 Glycoprotein Antibodies [[id number]] Collected: 05/20/25 1018    Specimen: Blood Updated: 05/20/25 1028    Cardiolipin Antibody [[id number]] Collected: 05/20/25 1018    Specimen: Blood Updated: 05/20/25 1028    Factor 5 Leiden [[id number]]     Specimen: Blood     Holter Monitor - 72 Hour Up To 15 Days [[id number]]     Homocysteine [[id number]]     Specimen: Blood     Lupus Anticoagulant [[id number]] Collected: 05/20/25 1018    Specimen: Blood Updated: 05/20/25 1028    Protein C & S Antigens [[id number]]     Specimen: Blood     Rheumatoid Factor [[id number]]     Specimen: Blood               Cierra Cueva MD  05/20/25  17:04 EDT    Time: Discharge 25 min

## 2025-05-20 NOTE — PLAN OF CARE
Problem: Adult Inpatient Plan of Care  Goal: Plan of Care Review  Outcome: Progressing  Goal: Patient-Specific Goal (Individualized)  Outcome: Progressing  Goal: Absence of Hospital-Acquired Illness or Injury  Outcome: Progressing  Intervention: Identify and Manage Fall Risk  Recent Flowsheet Documentation  Taken 5/20/2025 0351 by Alicia Polanco LPN  Safety Promotion/Fall Prevention: safety round/check completed  Taken 5/20/2025 0205 by Alicia Polanco LPN  Safety Promotion/Fall Prevention: safety round/check completed  Intervention: Prevent Skin Injury  Recent Flowsheet Documentation  Taken 5/20/2025 0351 by Alicia Polanco LPN  Body Position: position changed independently  Intervention: Prevent Infection  Recent Flowsheet Documentation  Taken 5/20/2025 0351 by Alicia Polanco LPN  Infection Prevention:   environmental surveillance performed   equipment surfaces disinfected  Taken 5/20/2025 0205 by Alicia Polanco LPN  Infection Prevention:   environmental surveillance performed   equipment surfaces disinfected  Goal: Optimal Comfort and Wellbeing  Outcome: Progressing  Goal: Readiness for Transition of Care  Outcome: Progressing  Goal: Plan of Care Review  Outcome: Progressing  Goal: Patient-Specific Goal (Individualized)  Outcome: Progressing  Goal: Absence of Hospital-Acquired Illness or Injury  Outcome: Progressing  Intervention: Identify and Manage Fall Risk  Recent Flowsheet Documentation  Taken 5/20/2025 0351 by Alicia Polanco LPN  Safety Promotion/Fall Prevention: safety round/check completed  Taken 5/20/2025 0205 by Alicia Polanco LPN  Safety Promotion/Fall Prevention: safety round/check completed  Intervention: Prevent Skin Injury  Recent Flowsheet Documentation  Taken 5/20/2025 0351 by Alicia Polanco LPN  Body Position: position changed independently  Intervention: Prevent Infection  Recent Flowsheet Documentation  Taken 5/20/2025 0351 by Alicia Polanco LPN  Infection Prevention:    environmental surveillance performed   equipment surfaces disinfected  Taken 5/20/2025 0205 by Alicia Polanco LPN  Infection Prevention:   environmental surveillance performed   equipment surfaces disinfected  Goal: Optimal Comfort and Wellbeing  Outcome: Progressing  Goal: Readiness for Transition of Care  Outcome: Progressing     Problem: Comorbidity Management  Goal: Blood Pressure in Desired Range  Outcome: Progressing  Intervention: Maintain Blood Pressure Management  Recent Flowsheet Documentation  Taken 5/20/2025 0351 by Alicia Polanco LPN  Medication Review/Management: medications reviewed  Taken 5/20/2025 0205 by Alicia Polanco LPN  Medication Review/Management: medications reviewed     Problem: Stroke, Ischemic (Includes Transient Ischemic Attack)  Goal: Optimal Coping  Outcome: Progressing  Goal: Effective Bowel Elimination  Outcome: Progressing  Goal: Optimal Cerebral Tissue Perfusion  Outcome: Progressing  Goal: Optimal Cognitive Function  Outcome: Progressing  Goal: Improved Communication Skills  Outcome: Progressing  Goal: Optimal Functional Ability  Outcome: Progressing  Goal: Optimal Nutrition Intake  Outcome: Progressing  Goal: Effective Oxygenation and Ventilation  Outcome: Progressing  Goal: Improved Sensorimotor Function  Outcome: Progressing  Goal: Safe and Effective Swallow  Outcome: Progressing  Goal: Effective Urinary Elimination  Outcome: Progressing   Goal Outcome Evaluation:

## 2025-05-20 NOTE — PLAN OF CARE
Goal Outcome Evaluation:  Plan of Care Reviewed With: patient        Progress: improving     Patient completed DARREN, discharging home today, needs follow up appt with cardio, PCP, and hem/onc, family at bedside and aware. Questions and concerns answered.      Problem: Adult Inpatient Plan of Care  Goal: Plan of Care Review  Outcome: Progressing  Flowsheets (Taken 5/20/2025 1730)  Progress: improving  Plan of Care Reviewed With: patient  Goal: Patient-Specific Goal (Individualized)  Outcome: Progressing  Goal: Absence of Hospital-Acquired Illness or Injury  Outcome: Progressing  Goal: Optimal Comfort and Wellbeing  Outcome: Progressing  Goal: Readiness for Transition of Care  Outcome: Progressing  Goal: Plan of Care Review  Outcome: Progressing  Flowsheets (Taken 5/20/2025 1730)  Progress: improving  Plan of Care Reviewed With: patient  Goal: Patient-Specific Goal (Individualized)  Outcome: Progressing  Goal: Absence of Hospital-Acquired Illness or Injury  Outcome: Progressing  Goal: Optimal Comfort and Wellbeing  Outcome: Progressing  Goal: Readiness for Transition of Care  Outcome: Progressing     Problem: Comorbidity Management  Goal: Blood Pressure in Desired Range  Outcome: Progressing     Problem: Stroke, Ischemic (Includes Transient Ischemic Attack)  Goal: Optimal Coping  Outcome: Progressing  Goal: Effective Bowel Elimination  Outcome: Progressing  Goal: Optimal Cerebral Tissue Perfusion  Outcome: Progressing  Goal: Optimal Cognitive Function  Outcome: Progressing  Goal: Improved Communication Skills  Outcome: Progressing  Goal: Optimal Functional Ability  Outcome: Progressing  Goal: Optimal Nutrition Intake  Outcome: Progressing  Goal: Effective Oxygenation and Ventilation  Outcome: Progressing  Goal: Improved Sensorimotor Function  Outcome: Progressing  Goal: Safe and Effective Swallow  Outcome: Progressing  Goal: Effective Urinary Elimination  Outcome: Progressing

## 2025-05-20 NOTE — CONSULTS
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 male who presented to [facility] on 2025 with complaints of blurred vision of left eye.  He states on Saturday he noticed blurred vision in his left eye around central visual field.  He went to the eye doctor and was diagnosed with central retinal artery occlusion and sent to the emergency room for stroke evaluation.  CT imaging of the head showed no acute intracranial process.  CT angiogram of the head and neck showed no acute abnormality identified within the large arteries nor any significant stenosis of bilateral internal carotid arteries.  MRI brain without contrast was also normal.  Patient underwent noncontrasted CT imaging of the abdomen and pelvis to rule out any malignancy and this showed no acute findings.    Of note, patient was recently admitted last week with chest pain.  He had been working on his truck when he noted fatigue, left arm and midsternal chest pressure.  He noted elevated blood pressure 160s over 100s and went to the ED for further evaluation.  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  Echocardiogram and stress test were low risk studies and cardiac catheterization showed no significant occlusive disease.  He was advised to continue PPI therapy and follow-up with gastroenterology.  He was also started on rivaroxaban for great saphenous vein thrombosis with recommendations for reimaging in 3 months.  He did not start his  anticoagulation until the morning of 5/19/2025.  Patient denies any previous history of blood clots or family history of blood clots.    05/20/25  Hematology/Oncology was consulted.    He/She  has a past medical history of Laryngopharyngeal reflux (LPR).    PCP: Aristeo Rodriguez MD    History:  Past Medical History:   Diagnosis Date    Laryngopharyngeal reflux (LPR)    ,   Past Surgical History:   Procedure Laterality Date    CARDIAC CATHETERIZATION N/A 5/16/2025    Procedure: Left Heart Cath radial;  Surgeon: Richard Moore DO;  Location: [facility] CATH INVASIVE LOCATION;  Service: Cardiovascular;  Laterality: N/A;    COLONOSCOPY      COLONOSCOPY N/A 1/29/2024    Procedure: COLONOSCOPY WITH POLYPECTOMY X1;  Surgeon: Tre Fitzpatrick MD;  Location: [facility] ENDOSCOPY;  Service: Gastroenterology;  Laterality: N/A;  POST: POLYP    ENDOSCOPY N/A 1/29/2024    Procedure: ESOPHAGOGASTRODUODENOSCOPY WITH BIOPSY X1 AREA AND DILATION UP TO 50;  Surgeon: Tre Fitzpatrick MD;  Location: [facility] ENDOSCOPY;  Service: Gastroenterology;  Laterality: N/A;  POST: GASTRITIS, ESOPHAGEAL STRICTURE   ,   Family History   Problem Relation Age of Onset    Cancer Father    ,   Social History     Tobacco Use    Smoking status: Never    Smokeless tobacco: Never   Vaping Use    Vaping status: Never Used   Substance Use Topics    Alcohol use: Yes     Comment: Occasional    Drug use: Never   ,   Medications Prior to Admission   Medication Sig Dispense Refill Last Dose/Taking    acetaminophen (TYLENOL) 325 MG tablet Take 2 tablets by mouth Every 6 (Six) Hours As Needed for Mild Pain.   Taking As Needed    omeprazole (priLOSEC) 20 MG capsule Take 1 capsule by mouth Daily. 20 capsule 0 Taking    rivaroxaban (XARELTO) 15 MG tablet Take 1 tablet by mouth 2 (Two) Times a Day With Meals.   5/19/2025 Morning    Lidocaine Viscous HCl (XYLOCAINE) 2 % solution 5cc with Maalox twice daily for up to 1 week for GERD symptoms 100 mL 0      "lisinopril (PRINIVIL,ZESTRIL) 10 MG tablet Take 1 tablet by mouth Daily.   5/14/2025    [START ON 6/9/2025] rivaroxaban (XARELTO) 20 MG tablet Take 1 tablet by mouth Daily.      , Scheduled Meds:  aspirin, 81 mg, Oral, Daily  atorvastatin, 80 mg, Oral, Nightly  lisinopril, 10 mg, Oral, Daily  pantoprazole, 40 mg, Oral, Q AM  rivaroxaban, 15 mg, Oral, BID With Meals  sodium chloride, 10 mL, Intravenous, Q12H    , Continuous Infusions:   , PRN Meds:    Calcium Replacement - Follow Nurse / BPA Driven Protocol    diazePAM    hydrALAZINE    Magnesium Standard Dose Replacement - Follow Nurse / BPA Driven Protocol    ondansetron    Phosphorus Replacement - Follow Nurse / BPA Driven Protocol    Potassium Replacement - Follow Nurse / BPA Driven Protocol    sodium chloride    sodium chloride    sodium chloride   Allergies:  Patient has no known allergies.    Subjective     ROS:  Review of Systems     Objective   Vital Signs:   /61 (BP Location: Left arm, Patient Position: Lying)   Pulse 76   Temp 97.6 °F (36.4 °C) (Oral)   Resp 19   Ht 182.9 cm (72\")   Wt 77.7 kg (171 lb 4.8 oz)   SpO2 99%   BMI 23.23 kg/m²     Physical Exam: (performed by MD)  Physical Exam    Results Review:  Lab Results (last 48 hours)       Procedure Component Value Units Date/Time    Folate [[id number]] Collected: 05/20/25 1018    Specimen: Blood Updated: 05/20/25 1028    Cardiolipin Antibody [[id number]] Collected: 05/20/25 1018    Specimen: Blood Updated: 05/20/25 1028    Beta-2 Glycoprotein Antibodies [[id number]] Collected: 05/20/25 1018    Specimen: Blood Updated: 05/20/25 1028    Lupus Anticoagulant [[id number]] Collected: 05/20/25 1018    Specimen: Blood Updated: 05/20/25 1028    Basic Metabolic Panel [[id number]]  (Abnormal) Collected: 05/19/25 2216    Specimen: Blood from Arm, Right Updated: 05/19/25 2312     Glucose 107 mg/dL      BUN 13 mg/dL      Creatinine 1.26 mg/dL      Sodium 137 mmol/L      Potassium 3.9 mmol/L      Comment: " Specimen hemolyzed.  Result may be falsely elevated.        Chloride 102 mmol/L      CO2 24.7 mmol/L      Calcium 9.0 mg/dL      BUN/Creatinine Ratio 10.3     Anion Gap 10.3 mmol/L      eGFR 65.3 mL/min/1.73     Narrative:      GFR Categories in Chronic Kidney Disease (CKD)              GFR Category          GFR (mL/min/1.73)    Interpretation  G1                    90 or greater        Normal or high (1)  G2                    60-89                Mild decrease (1)  G3a                   45-59                Mild to moderate decrease  G3b                   30-44                Moderate to severe decrease  G4                    15-29                Severe decrease  G5                    14 or less           Kidney failure    (1)In the absence of evidence of kidney disease, neither GFR category G1 or G2 fulfill the criteria for CKD.    eGFR calculation 2021 CKD-EPI creatinine equation, which does not include race as a factor    CBC & Differential [[id number]]  (Normal) Collected: 05/19/25 2216    Specimen: Blood from Arm, Right Updated: 05/19/25 2246    Narrative:      The following orders were created for panel order CBC & Differential.  Procedure                               Abnormality         Status                     ---------                               -----------         ------                     CBC Auto Differential[[id number]]        Normal              Final result                 Please view results for these tests on the individual orders.    CBC Auto Differential [[id number]]  (Normal) Collected: 05/19/25 2216    Specimen: Blood from Arm, Right Updated: 05/19/25 2246     WBC 6.56 10*3/mm3      RBC 4.50 10*6/mm3      Hemoglobin 13.6 g/dL      Hematocrit 40.9 %      MCV 90.9 fL      MCH 30.2 pg      MCHC 33.3 g/dL      RDW 12.7 %      RDW-SD 42.3 fl      MPV 9.3 fL      Platelets 258 10*3/mm3      Neutrophil % 54.2 %      Lymphocyte % 31.1 %      Monocyte % 8.7 %      Eosinophil % 4.9 %      Basophil %  0.8 %      Immature Grans % 0.3 %      Neutrophils, Absolute 3.56 10*3/mm3      Lymphocytes, Absolute 2.04 10*3/mm3      Monocytes, Absolute 0.57 10*3/mm3      Eosinophils, Absolute 0.32 10*3/mm3      Basophils, Absolute 0.05 10*3/mm3      Immature Grans, Absolute 0.02 10*3/mm3      nRBC 0.0 /100 WBC     C-reactive Protein [[id number]]  (Normal) Collected: 05/19/25 2007    Specimen: Blood Updated: 05/19/25 2049     C-Reactive Protein <0.30 mg/dL     T4, Free [[id number]]  (Normal) Collected: 05/19/25 2007    Specimen: Blood Updated: 05/19/25 2049     Free T4 1.30 ng/dL     Sedimentation Rate [[id number]]  (Normal) Collected: 05/19/25 2007    Specimen: Blood Updated: 05/19/25 2024     Sed Rate 3 mm/hr     Vitamin B12 [[id number]]  (Normal) Collected: 05/19/25 1223    Specimen: Blood Updated: 05/19/25 2008     Vitamin B-12 838 pg/mL     Narrative:      Results may be falsely increased if patient taking Biotin.      POC Glucose Once [[id number]]  (Abnormal) Collected: 05/19/25 1839    Specimen: Blood Updated: 05/19/25 1840     Glucose 136 mg/dL      Comment: Serial Number: [id number]:  [id number]       TSH [[id number]]  (Normal) Collected: 05/19/25 1223    Specimen: Blood Updated: 05/19/25 1619     TSH 2.460 uIU/mL     Hemoglobin A1c [[id number]]  (Normal) Collected: 05/19/25 1223    Specimen: Blood Updated: 05/19/25 1613     Hemoglobin A1C 5.37 %     Narrative:      Hemoglobin A1C Ranges:    Increased Risk for Diabetes  5.7% to 6.4%  Diabetes                     >= 6.5%  Diabetic Goal                < 7.0%    Protime-INR [[id number]]  (Abnormal) Collected: 05/19/25 1223    Specimen: Blood Updated: 05/19/25 1311     Protime 21.7 Seconds      INR 1.88    Comprehensive Metabolic Panel [[id number]]  (Abnormal) Collected: 05/19/25 1223    Specimen: Blood Updated: 05/19/25 1249     Glucose 124 mg/dL      BUN 12 mg/dL      Creatinine 1.21 mg/dL      Sodium 135 mmol/L      Potassium 3.8 mmol/L      Chloride 98 mmol/L      CO2  27.0 mmol/L      Calcium 9.5 mg/dL      Total Protein 7.5 g/dL      Albumin 4.8 g/dL      ALT (SGPT) 18 U/L      AST (SGOT) 22 U/L      Alkaline Phosphatase 90 U/L      Total Bilirubin 0.5 mg/dL      Globulin 2.7 gm/dL      A/G Ratio 1.8 g/dL      BUN/Creatinine Ratio 9.9     Anion Gap 10.0 mmol/L      eGFR 68.5 mL/min/1.73     Narrative:      GFR Categories in Chronic Kidney Disease (CKD)              GFR Category          GFR (mL/min/1.73)    Interpretation  G1                    90 or greater        Normal or high (1)  G2                    60-89                Mild decrease (1)  G3a                   45-59                Mild to moderate decrease  G3b                   30-44                Moderate to severe decrease  G4                    15-29                Severe decrease  G5                    14 or less           Kidney failure    (1)In the absence of evidence of kidney disease, neither GFR category G1 or G2 fulfill the criteria for CKD.    eGFR calculation 2021 CKD-EPI creatinine equation, which does not include race as a factor    CBC & Differential [[id number]]  (Normal) Collected: 05/19/25 1223    Specimen: Blood Updated: 05/19/25 1232    Narrative:      The following orders were created for panel order CBC & Differential.  Procedure                               Abnormality         Status                     ---------                               -----------         ------                     CBC Auto Differential[[id number]]        Normal              Final result                 Please view results for these tests on the individual orders.    CBC Auto Differential [[id number]]  (Normal) Collected: 05/19/25 1223    Specimen: Blood Updated: 05/19/25 1232     WBC 5.86 10*3/mm3      RBC 4.77 10*6/mm3      Hemoglobin 14.5 g/dL      Hematocrit 43.8 %      MCV 91.8 fL      MCH 30.4 pg      MCHC 33.1 g/dL      RDW 12.6 %      RDW-SD 42.9 fl      MPV 9.2 fL      Platelets 250 10*3/mm3      Neutrophil % 71.0 %       Lymphocyte % 20.0 %      Monocyte % 5.8 %      Eosinophil % 2.0 %      Basophil % 0.7 %      Immature Grans % 0.5 %      Neutrophils, Absolute 4.16 10*3/mm3      Lymphocytes, Absolute 1.17 10*3/mm3      Monocytes, Absolute 0.34 10*3/mm3      Eosinophils, Absolute 0.12 10*3/mm3      Basophils, Absolute 0.04 10*3/mm3      Immature Grans, Absolute 0.03 10*3/mm3      nRBC 0.0 /100 WBC     [place] Draw [[id number]] Collected: 05/19/25 1223    Specimen: Blood Updated: 05/19/25 1230    Narrative:      The following orders were created for panel order [place] Draw.  Procedure                               Abnormality         Status                     ---------                               -----------         ------                     Green Top (Gel)[[id number]]                                  Final result               Lavender Top[[id number]]                                     Final result               Gold Top - SST[[id number]]                                   Final result               Light Blue Top[[id number]]                                   Final result                 Please view results for these tests on the individual orders.    Green Top (Gel) [[id number]] Collected: 05/19/25 1223    Specimen: Blood Updated: 05/19/25 1230     Extra Tube Hold for add-ons.     Comment: Auto resulted.       Lavender Top [[id number]] Collected: 05/19/25 1223    Specimen: Blood Updated: 05/19/25 1230     Extra Tube hold for add-on     Comment: Auto resulted       Gold Top - SST [[id number]] Collected: 05/19/25 1223    Specimen: Blood Updated: 05/19/25 1230     Extra Tube Hold for add-ons.     Comment: Auto resulted.       Light Blue Top [[id number]] Collected: 05/19/25 1223    Specimen: Blood Updated: 05/19/25 1230     Extra Tube Hold for add-ons.     Comment: Auto resulted                Pending Results:     Imaging Reviewed:   CT Abdomen Pelvis Without Contrast  Result Date: 5/19/2025  Impression: 1.No acute abdominal or pelvic  abnormality identified on unenhanced CT imaging. 2.No evidence of malignancy on unenhanced CT imaging. 3.Radiodense foci within the prostate gland. While these may represent calcifications, these may also be a result of therapy. Correlate with history. Electronically Signed: Brian Vargas MD  5/19/2025 4:08 PM EDT  Workstation ID: [id number]    MRI Brain Without Contrast  Result Date: 5/19/2025  Impression: Normal MR brain without contrast. Electronically Signed: Brynn Mensah MD  5/19/2025 2:54 PM EDT  Workstation ID: [id number]    CT Angiogram Neck  Result Date: 5/19/2025  1.No acute abnormality is identified within the large arteries of the head or neck. 2.No significant stenosis of the bilateral internal carotid arteries. Electronically Signed: Giovani Lovell MD  5/19/2025 1:40 PM EDT  Workstation ID: [id number]    CT Angiogram Head  Result Date: 5/19/2025  1.No acute abnormality is identified within the large arteries of the head or neck. 2.No significant stenosis of the bilateral internal carotid arteries. Electronically Signed: Giovani Lovell MD  5/19/2025 1:40 PM EDT  Workstation ID: [id number]    CT Head Without Contrast Stroke Protocol  Result Date: 5/19/2025  Impression: No acute intracranial process identified. Electronically Signed: Flip Saha MD  5/19/2025 1:16 PM EDT  Workstation ID: [id number]    XR Chest 1 View  Result Date: 5/19/2025  Impression: No acute chest finding. Electronically Signed: Brynn Mensah MD  5/19/2025 12:39 PM EDT  Workstation ID: [id number]    CT Angiogram Chest Pulmonary Embolism  Result Date: 5/14/2025  Impression: 1. Negative for pulmonary embolus. 2. No acute intrathoracic process. 3. Chronic incidental findings above. Electronically Signed: Sreekanth Huang MD  5/14/2025 10:20 PM EDT  Workstation ID: [id number]    XR Chest 1 View  Result Date: 5/14/2025  Impression: No acute process. Electronically Signed: Sreekanth Huang MD  5/14/2025 9:11 PM EDT  Workstation ID: [id number]        "    Assessment & Plan   ASSESSMENT  Retinal artery occlusion: Neurology has consulted for further evaluation  Superficial thrombosis of the great saphenous vein: Anticoagulation is not necessarily indicated. He has been started on anticoagulation with rivaroxaban.  At this time, will continue and follow up in about 1 month in outpatient setting.     PLAN  As above  Further recommendations per Dr. Kearney    Above note was prepared for Dr. Gonzalo Kearney -physical exam and review of systems were performed by physician.      Electronically signed by Keyla Ramos PA-C, 05/20/25 5/20/2025: I was asked to see Mr. Carrillo who has been in the hospital twice in the recent past.  The first time he came complaining of chest pains, dyspnea and palpitations.  He underwent several investigations, including a cardiac catheterization.  Imaging revealed calcifications of the aorta.  He was discharged with improvement of his symptoms and no indication of severe coronary artery disease.  Approximately 12 hours later he developed visual symptoms out of the left eye.  He describes some loss of the lateral peripheral vision.  He was seen by his optometrist who identified several areas of infarction in his left retina.  He was admitted with concern of stroke.  This was ruled out at the time of admission.  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  I was asked to see him for \"hypercoagulable condition\".  He has no previous history of thrombosis and has never needed anticoagulation.  He has no family history of thrombosis although one of his sisters had, reportedly, an ischemic stroke at a young age.  On exam he is a well-built man who does not seem in distress.  He is conversant and oriented.  There is no jaundice or pallor.  The gaze is conjugate and the pupils are equally reactive to light.  No oral lesions.  No palpable lymphadenopathy.  The lungs are clear bilaterally " and the heart regular.  Abdomen is soft.  No edema.  Laboratory exams reviewed.  I have requested lupus anticoagulant and anticardiolipin and antibeta 2 glycoprotein antibodies only because these could change the conduct.  However it is well-described that coronary catheterization can result in embolic stroke and embolic occlusion of the retinal artery.  The frequency of these complications is not high, however, it is a well-known potential problem of any arterial catheter procedures.  It is actually more common in coronary angiogram, particularly with the transradial route.  I do not believe that the possible chronic thrombophlebitis of the saphenous veins and the retinal artery occlusion are related and I do not believe that he needs anticoagulation.  He has been started on rivaroxaban which he can continue for the next 3 to 4 weeks and then discontinue unless the lupus anticoagulant is positive, in which case he would need a different anticoagulant.  Discussed at length with him and with his family.  I reviewed the record and discussed with Ms. Lukas ASHFORD.  I concur with her note.  I formulated the analysis and the plans.    Gonzalo Kearney MD on 5/20/2025 at 1602.

## 2025-05-20 NOTE — CONSULTS
CC--- retinal artery occlusion    Sub  Consult requested by neurology    59-year-old male patient underwent recent cardiac catheterization without any complications from right radial approach.  No significant coronary artery stenosis with normal EF.  Patient woke up next day with visual symptoms on his left eye and went and saw his ophthalmologist and examination suggestive of retinal artery occlusion with suggestive of embolic phenomena and patient was seen by neurology and MRI of the brain without any deficit.  DARREN was requested to evaluate for any cardioembolic source.  Patient also has superficial thrombophlebitis in his lower extremities started on anticoagulation          Past Medical History:   Diagnosis Date    Laryngopharyngeal reflux (LPR)      Past Surgical History:   Procedure Laterality Date    CARDIAC CATHETERIZATION N/A 5/16/2025    Procedure: Left Heart Cath radial;  Surgeon: Richard Moore DO;  Location: [facility] CATH INVASIVE LOCATION;  Service: Cardiovascular;  Laterality: N/A;    COLONOSCOPY      COLONOSCOPY N/A 1/29/2024    Procedure: COLONOSCOPY WITH POLYPECTOMY X1;  Surgeon: Tre Fitzpatrick MD;  Location: [facility] ENDOSCOPY;  Service: Gastroenterology;  Laterality: N/A;  POST: POLYP    ENDOSCOPY N/A 1/29/2024    Procedure: ESOPHAGOGASTRODUODENOSCOPY WITH BIOPSY X1 AREA AND DILATION UP TO 50;  Surgeon: Tre Fitzpatrick MD;  Location: [facility] ENDOSCOPY;  Service: Gastroenterology;  Laterality: N/A;  POST: GASTRITIS, ESOPHAGEAL STRICTURE     Family History   Problem Relation Age of Onset    Cancer Father      Social History     Tobacco Use    Smoking status: Never    Smokeless tobacco: Never   Vaping Use    Vaping status: Never Used   Substance Use Topics    Alcohol use: Yes     Comment: Occasional    Drug use: Never     Medications Prior to Admission   Medication Sig Dispense Refill Last Dose/Taking    acetaminophen (TYLENOL) 325 MG tablet Take 2 tablets by mouth Every 6  (Six) Hours As Needed for Mild Pain.   Taking As Needed    omeprazole (priLOSEC) 20 MG capsule Take 1 capsule by mouth Daily. 20 capsule 0 Taking    rivaroxaban (XARELTO) 15 MG tablet Take 1 tablet by mouth 2 (Two) Times a Day With Meals.   5/19/2025 Morning    Lidocaine Viscous HCl (XYLOCAINE) 2 % solution 5cc with Maalox twice daily for up to 1 week for GERD symptoms 100 mL 0     lisinopril (PRINIVIL,ZESTRIL) 10 MG tablet Take 1 tablet by mouth Daily.   5/14/2025    [START ON 6/9/2025] rivaroxaban (XARELTO) 20 MG tablet Take 1 tablet by mouth Daily.        Allergies:  Patient has no known allergies.      Physical Exam    General:      well developed, well nourished, in no acute distress.    Head:      normocephalic and atraumatic.    Eyes:      PERRL/EOM intact, conjunctivae and sclerae clear without nystagmus.    Neck:      no  thyromegaly, trachea central with normal respiratory effort  Lungs:      clear bilaterally to auscultation.    Heart:       regular rate and rhythm, S1, S2 without murmurs, rubs, or gallops  Skin:      intact without lesions or rashes.    Psych:      alert and cooperative; normal mood and affect; normal attention span and concentration.        CBC    Results from last 7 days   Lab Units 05/19/25 2216 05/19/25 1223 05/16/25 0426 05/15/25  0041 05/14/25  2015   WBC 10*3/mm3 6.56 5.86 8.33 6.84 8.08   HEMOGLOBIN g/dL 13.6 14.5 13.2 11.5* 13.1   PLATELETS 10*3/mm3 258 250 249 212 256     BMP   Results from last 7 days   Lab Units 05/19/25 2216 05/19/25 1223 05/16/25  0426 05/15/25  1212 05/15/25  0041 05/14/25  2015   SODIUM mmol/L 137 135* 139 140 141 140   POTASSIUM mmol/L 3.9 3.8 4.1 3.9 4.3 4.7   CHLORIDE mmol/L 102 98 106 107 104 104   CO2 mmol/L 24.7 27.0 24.2 22.3 26.3 27.7   BUN mg/dL 13 12 13 12 13 14   CREATININE mg/dL 1.26 1.21 1.12 1.03 1.34* 1.32*   GLUCOSE mg/dL 107* 124* 119* 94 105* 102*   MAGNESIUM mg/dL  --   --   --   --   --  2.2   PHOSPHORUS mg/dL  --   --   --  2.9   --   --      CMP   Results from last 7 days   Lab Units 05/19/25  2216 05/19/25  1223 05/16/25  0426 05/15/25  1212 05/15/25  0041 05/14/25 2015   SODIUM mmol/L 137 135* 139 140 141 140   POTASSIUM mmol/L 3.9 3.8 4.1 3.9 4.3 4.7   CHLORIDE mmol/L 102 98 106 107 104 104   CO2 mmol/L 24.7 27.0 24.2 22.3 26.3 27.7   BUN mg/dL 13 12 13 12 13 14   CREATININE mg/dL 1.26 1.21 1.12 1.03 1.34* 1.32*   GLUCOSE mg/dL 107* 124* 119* 94 105* 102*   ALBUMIN g/dL  --  4.8  --  4.3  --  4.6   BILIRUBIN mg/dL  --  0.5  --   --   --  0.4   ALK PHOS U/L  --  90  --   --   --  95   AST (SGOT) U/L  --  22  --   --   --  23   ALT (SGPT) U/L  --  18  --   --   --  17   LIPASE U/L  --   --   --   --   --  32     Radiology(recent) CT Abdomen Pelvis Without Contrast  Result Date: 5/19/2025  Impression: 1.No acute abdominal or pelvic abnormality identified on unenhanced CT imaging. 2.No evidence of malignancy on unenhanced CT imaging. 3.Radiodense foci within the prostate gland. While these may represent calcifications, these may also be a result of therapy. Correlate with history. Electronically Signed: Brian Vargas MD  5/19/2025 4:08 PM EDT  Workstation ID: [id number]    MRI Brain Without Contrast  Result Date: 5/19/2025  Impression: Normal MR brain without contrast. Electronically Signed: Brynn Mensah MD  5/19/2025 2:54 PM EDT  Workstation ID: [id number]    CT Angiogram Neck  Result Date: 5/19/2025  1.No acute abnormality is identified within the large arteries of the head or neck. 2.No significant stenosis of the bilateral internal carotid arteries. Electronically Signed: Giovani Lovell MD  5/19/2025 1:40 PM EDT  Workstation ID: [id number]    CT Angiogram Head  Result Date: 5/19/2025  1.No acute abnormality is identified within the large arteries of the head or neck. 2.No significant stenosis of the bilateral internal carotid arteries. Electronically Signed: Giovani Lovell MD  5/19/2025 1:40 PM EDT  Workstation ID: [id number]    CT Head  Without Contrast Stroke Protocol  Result Date: 5/19/2025  Impression: No acute intracranial process identified. Electronically Signed: Flip Saha MD  5/19/2025 1:16 PM EDT  Workstation ID: [id number]    XR Chest 1 View  Result Date: 5/19/2025  Impression: No acute chest finding. Electronically Signed: Brynn Mensah MD  5/19/2025 12:39 PM EDT  Workstation ID: [id number]         Assessment and plan    Suspected retinal artery occlusion with embolic phenomenon  Recent left heart catheterization and symptoms started the day after the procedure  Superficial thrombophlebitis  Hypertension  Hyperlipidemia  Check for hypercoagulable state and labs ordered  DARREN scheduled and discussed with the patient and family and orders for DARREN placed  Neurology notes reviewed      Electronically signed by Sidney Knott MD, 05/20/25, 2:16 PM EDT.

## 2025-05-20 NOTE — ANESTHESIA POSTPROCEDURE EVALUATION
Patient: Flip Vang    Procedure Summary       Date: 05/20/25 Room / Location: [facility]    Anesthesia Start: 1423 Anesthesia Stop: 1441    Procedure: ADULT TRANSESOPHAGEAL ECHO (DARREN) W/ CONT IF NECESSARY PER PROTOCOL Diagnosis: (Cardiac Source of Emboli)    Scheduled Providers:  Provider: Brianna Bryant MD    Anesthesia Type: general ASA Status: 3            Anesthesia Type: general    Vitals  Vitals Value Taken Time   /71 05/20/25 14:56   Temp     Pulse 70 05/20/25 14:59   Resp     SpO2 99 % 05/20/25 14:59   Vitals shown include unfiled device data.        Post Anesthesia Care and Evaluation    Patient location during evaluation: PACU  Patient participation: complete - patient participated  Level of consciousness: awake and alert  Pain management: satisfactory to patient    Airway patency: patent  Anesthetic complications: No anesthetic complications  PONV Status: none  Cardiovascular status: acceptable  Respiratory status: acceptable  Hydration status: acceptable

## 2025-05-21 ENCOUNTER — TELEPHONE (OUTPATIENT)
Dept: CARDIOLOGY | Facility: CLINIC | Age: 60
End: 2025-05-21

## 2025-05-21 ENCOUNTER — TELEPHONE (OUTPATIENT)
Dept: ONCOLOGY | Facility: CLINIC | Age: 60
End: 2025-05-21

## 2025-05-21 LAB
B2 GLYCOPROT1 IGA SER-ACNC: <9 GPI IGA UNITS (ref 0–25)
B2 GLYCOPROT1 IGG SER-ACNC: <9 GPI IGG UNITS (ref 0–20)
B2 GLYCOPROT1 IGM SER-ACNC: <9 GPI IGM UNITS (ref 0–32)
CARDIOLIPIN IGA SER IA-ACNC: <9 APL U/ML (ref 0–11)
CARDIOLIPIN IGG SER IA-ACNC: <9 GPL U/ML (ref 0–14)
CARDIOLIPIN IGM SER IA-ACNC: <9 MPL U/ML (ref 0–12)
CHROMATIN AB SERPL-ACNC: <10 IU/ML (ref 0–14)
F5 GENE MUT ANL BLD/T: NORMAL
HCYS SERPL-MCNC: 15.3 UMOL/L (ref 0–15)

## 2025-05-21 NOTE — TELEPHONE ENCOUNTER
Caller: Flip Vang    Relationship to patient: Self    Best call back number: [phone]    Chief complaint: N/A    Type of visit: HOSPITAL F/U    Requested date: AROUND 6/20, PER DISCHARGE SUMMARY     If rescheduling, when is the original appointment: N/A     Additional notes:PT WAS SEEN BY DR WILKINS IN ED W/ DISCHARGE SUMMARY STATING PT NEEDS TO F/U W/ DR WILKINS IN 1 MONTH (6/20); HOWEVER, NO AVAILABILITY UNTIL 8/14. PLEASE CALL PT TO ADVISE.

## 2025-05-21 NOTE — TELEPHONE ENCOUNTER
Caller: Flip Vang    Relationship to patient: Self    Best call back number: [phone]    Chief complaint: SCHEDULING     Type of visit: HOSPITAL FOLLOW UP    Requested date: IN 1 MONTH     Additional notes: HUB UNABLE TO SCHEDULE WITH IN TIMEFRAME

## 2025-05-22 LAB
APTT HEX PL PPP: 6 SEC (ref 0–11)
APTT SCREEN TO CONFIRM RATIO: 0.85 RATIO (ref 0–1.34)
CENTROMERE B AB SER-ACNC: <0.2 AI (ref 0–0.9)
CHROMATIN AB SERPL-ACNC: <0.2 AI (ref 0–0.9)
CONFIRM APTT/NORMAL: 56.1 SEC (ref 0–47.6)
DRVVT SCREEN TO CONFIRM RATIO: 1.5 RATIO (ref 0.8–1.2)
DSDNA AB SER-ACNC: <1 IU/ML (ref 0–9)
ENA JO1 AB SER-ACNC: <0.2 AI (ref 0–0.9)
ENA RNP AB SER-ACNC: <0.2 AI (ref 0–0.9)
ENA SCL70 AB SER-ACNC: <0.2 AI (ref 0–0.9)
ENA SM AB SER-ACNC: <0.2 AI (ref 0–0.9)
ENA SM+RNP AB SER-ACNC: <0.2 AI (ref 0–0.9)
ENA SS-A AB SER-ACNC: 0.4 AI (ref 0–0.9)
ENA SS-B AB SER-ACNC: <0.2 AI (ref 0–0.9)
LA 2 SCREEN W REFLEX-IMP: ABNORMAL
Lab: NORMAL
MIXING APTT: 43.2 SEC (ref 0–40.5)
MIXING DRVVT: 70.1 SEC (ref 0–40.4)
RIBOSOMAL P AB SER-ACNC: <0.2 AI (ref 0–0.9)
SCREEN APTT: 44.6 SEC (ref 0–43.5)
SCREEN DRVVT: 92.7 SEC (ref 0–47)
THROMBIN TIME: 22.1 SEC (ref 0–23)

## 2025-05-23 LAB
PROT C AG ACT/NOR PPP IA: 95 % (ref 60–150)
PROT S AG ACT/NOR PPP IA: 79 % (ref 60–150)
PROT S FREE AG ACT/NOR PPP IA: 97 % (ref 61–136)

## 2025-05-24 ENCOUNTER — HOSPITAL ENCOUNTER (EMERGENCY)
Facility: HOSPITAL | Age: 60
Discharge: HOME | End: 2025-05-24
Payer: COMMERCIAL

## 2025-05-24 ENCOUNTER — APPOINTMENT (OUTPATIENT)
Dept: CT IMAGING | Facility: HOSPITAL | Age: 60
End: 2025-05-24
Payer: COMMERCIAL

## 2025-05-24 VITALS
RESPIRATION RATE: 16 BRPM | SYSTOLIC BLOOD PRESSURE: 116 MMHG | WEIGHT: 170.19 LBS | TEMPERATURE: 97.7 F | OXYGEN SATURATION: 99 % | DIASTOLIC BLOOD PRESSURE: 79 MMHG | HEIGHT: 72 IN | BODY MASS INDEX: 23.05 KG/M2 | HEART RATE: 73 BPM

## 2025-05-24 DIAGNOSIS — G89.18 POST-OP PAIN: ICD-10-CM

## 2025-05-24 DIAGNOSIS — M79.601 RIGHT ARM PAIN: Primary | ICD-10-CM

## 2025-05-24 LAB
ANION GAP SERPL CALCULATED.3IONS-SCNC: 12.6 MMOL/L (ref 5–15)
APTT PPP: 32.8 SECONDS (ref 22.7–35.4)
BASOPHILS # BLD AUTO: 0.05 10*3/MM3 (ref 0–0.2)
BASOPHILS NFR BLD AUTO: 0.6 % (ref 0–1.5)
BUN SERPL-MCNC: 16 MG/DL (ref 8–23)
BUN/CREAT SERPL: 13.9 (ref 7–25)
CALCIUM SPEC-SCNC: 9 MG/DL (ref 8.6–10.5)
CHLORIDE SERPL-SCNC: 102 MMOL/L (ref 98–107)
CO2 SERPL-SCNC: 22.4 MMOL/L (ref 22–29)
CREAT SERPL-MCNC: 1.15 MG/DL (ref 0.76–1.27)
DEPRECATED RDW RBC AUTO: 41.4 FL (ref 37–54)
EGFRCR SERPLBLD CKD-EPI 2021: 72.9 ML/MIN/1.73
EOSINOPHIL # BLD AUTO: 0.2 10*3/MM3 (ref 0–0.4)
EOSINOPHIL NFR BLD AUTO: 2.3 % (ref 0.3–6.2)
ERYTHROCYTE [DISTWIDTH] IN BLOOD BY AUTOMATED COUNT: 12.5 % (ref 12.3–15.4)
GLUCOSE SERPL-MCNC: 101 MG/DL (ref 65–99)
HCT VFR BLD AUTO: 37.9 % (ref 37.5–51)
HGB BLD-MCNC: 12.7 G/DL (ref 13–17.7)
IMM GRANULOCYTES # BLD AUTO: 0.03 10*3/MM3 (ref 0–0.05)
IMM GRANULOCYTES NFR BLD AUTO: 0.3 % (ref 0–0.5)
INR PPP: 1.5 (ref 0.9–1.1)
LYMPHOCYTES # BLD AUTO: 1.36 10*3/MM3 (ref 0.7–3.1)
LYMPHOCYTES NFR BLD AUTO: 15.6 % (ref 19.6–45.3)
MCH RBC QN AUTO: 30.3 PG (ref 26.6–33)
MCHC RBC AUTO-ENTMCNC: 33.5 G/DL (ref 31.5–35.7)
MCV RBC AUTO: 90.5 FL (ref 79–97)
MONOCYTES # BLD AUTO: 0.58 10*3/MM3 (ref 0.1–0.9)
MONOCYTES NFR BLD AUTO: 6.6 % (ref 5–12)
NEUTROPHILS NFR BLD AUTO: 6.52 10*3/MM3 (ref 1.7–7)
NEUTROPHILS NFR BLD AUTO: 74.6 % (ref 42.7–76)
NRBC BLD AUTO-RTO: 0 /100 WBC (ref 0–0.2)
PLATELET # BLD AUTO: 247 10*3/MM3 (ref 140–450)
PMV BLD AUTO: 9.5 FL (ref 6–12)
POTASSIUM SERPL-SCNC: 3.9 MMOL/L (ref 3.5–5.2)
PROTHROMBIN TIME: 18.1 SECONDS (ref 11.7–14.2)
RBC # BLD AUTO: 4.19 10*6/MM3 (ref 4.14–5.8)
SODIUM SERPL-SCNC: 137 MMOL/L (ref 136–145)
WBC NRBC COR # BLD AUTO: 8.74 10*3/MM3 (ref 3.4–10.8)

## 2025-05-24 PROCEDURE — 99285 EMERGENCY DEPT VISIT HI MDM: CPT

## 2025-05-24 PROCEDURE — 80048 BASIC METABOLIC PNL TOTAL CA: CPT | Performed by: OCCUPATIONAL THERAPIST

## 2025-05-24 PROCEDURE — 73206 CT ANGIO UPR EXTRM W/O&W/DYE: CPT

## 2025-05-24 PROCEDURE — 85730 THROMBOPLASTIN TIME PARTIAL: CPT | Performed by: OCCUPATIONAL THERAPIST

## 2025-05-24 PROCEDURE — 99284 EMERGENCY DEPT VISIT MOD MDM: CPT | Performed by: STUDENT IN AN ORGANIZED HEALTH CARE EDUCATION/TRAINING PROGRAM

## 2025-05-24 PROCEDURE — 25510000001 IOPAMIDOL PER 1 ML: Performed by: STUDENT IN AN ORGANIZED HEALTH CARE EDUCATION/TRAINING PROGRAM

## 2025-05-24 PROCEDURE — 85025 COMPLETE CBC W/AUTO DIFF WBC: CPT | Performed by: OCCUPATIONAL THERAPIST

## 2025-05-24 PROCEDURE — 85610 PROTHROMBIN TIME: CPT | Performed by: OCCUPATIONAL THERAPIST

## 2025-05-24 RX ORDER — IOPAMIDOL 755 MG/ML
100 INJECTION, SOLUTION INTRAVASCULAR
Status: COMPLETED | OUTPATIENT
Start: 2025-05-24 | End: 2025-05-24

## 2025-05-24 RX ADMIN — IOPAMIDOL 100 ML: 755 INJECTION, SOLUTION INTRAVENOUS at 18:18

## 2025-05-24 NOTE — CONSULTS
Name: Flip Vang ADMIT: 2025   : 1965  PCP: Aristeo Rodriguez MD    MRN: [id number] LOS: 0 days   AGE/SEX: 59 y.o. male  ROOM: [facility]     Inpatient Vascular Surgery Consult  Consult performed by: Liusa Mcdonald MD  Consult ordered by: Bisi Diego PA-C Ashlee Ann Vinyard, MD     LOS: 0 days   Patient Care Team:  Aristeo Rodriguez MD as PCP - General (Family Medicine)  Aristeo Rodriguez MD as PCP - Family Medicine  Aristeo Rodriguez MD    Subjective     Chief complaint: right wrist pain and swelling    History of Present Illness  59 y.o. male presents to the [facility] emergency room complaining of right wrist pain and swelling after mowing the lawn with a push mower earlier today.  He had a right radial approach cardiac catheterization on 2025, which was complicated by thromboembolism to the left retina.  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  After review embolic event, he was started on rivaroxaban.  Prior to that, he was not taking any anticoagulation.  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  Today he used a push mower to mow his lawn, when he developed right wrist swelling and pain at the site of the catheterization.  The swelling resolved with firm pressure to the wrist.    Review of Systems   Constitutional:  Negative for chills and fever.   Respiratory:  Negative for shortness of breath.    Cardiovascular:  Negative for chest pain.       Past Medical History:   Diagnosis Date    Laryngopharyngeal reflux (LPR)        Past Surgical History:   Procedure Laterality Date    CARDIAC CATHETERIZATION N/A 2025    Procedure: Left Heart Cath radial;  Surgeon: Richard Moore DO;  Location: [facility] INVASIVE LOCATION;  Service: Cardiovascular;  Laterality: N/A;     COLONOSCOPY      COLONOSCOPY N/A 1/29/2024    Procedure: COLONOSCOPY WITH POLYPECTOMY X1;  Surgeon: Tre Fitzpatrick MD;  Location: [facility] ENDOSCOPY;  Service: Gastroenterology;  Laterality: N/A;  POST: POLYP    ENDOSCOPY N/A 1/29/2024    Procedure: ESOPHAGOGASTRODUODENOSCOPY WITH BIOPSY X1 AREA AND DILATION UP TO 50;  Surgeon: Tre Fitzpatrick MD;  Location: [facility] ENDOSCOPY;  Service: Gastroenterology;  Laterality: N/A;  POST: GASTRITIS, ESOPHAGEAL STRICTURE       Family History   Problem Relation Age of Onset    Cancer Father          Social History     Tobacco Use    Smoking status: Never    Smokeless tobacco: Never   Vaping Use    Vaping status: Never Used   Substance Use Topics    Alcohol use: Yes     Comment: Occasional    Drug use: Never       Allergies: Patient has no known allergies.    (Not in a hospital admission)      No current facility-administered medications for this encounter.          Objective   Temp:  [97.7 °F (36.5 °C)] 97.7 °F (36.5 °C)  Heart Rate:  [73-86] 73  Resp:  [16] 16  BP: (116-140)/(73-79) 116/79    No intake/output data recorded.    Physical Exam  Vitals reviewed.   Constitutional:       General: He is not in acute distress.     Appearance: Normal appearance.   HENT:      Head: Normocephalic and atraumatic.      Right Ear: External ear normal.      Left Ear: External ear normal.      Nose: Nose normal.      Mouth/Throat:      Mouth: Mucous membranes are moist.      Pharynx: Oropharynx is clear.   Eyes:      Extraocular Movements: Extraocular movements intact.      Conjunctiva/sclera: Conjunctivae normal.      Pupils: Pupils are equal, round, and reactive to light.   Cardiovascular:      Rate and Rhythm: Normal rate and regular rhythm.      Pulses:           Carotid pulses are 2+ on the right side and 2+ on the left side.       Radial pulses are 2+ on the right side and 2+ on the left side.      Comments: Right radial puncture site clean, dry, intact.  Mild bruising of  the right forearm superior to the right radial artery. No pulsatile mass at the wrist, hand well perfused.  Pulmonary:      Comments: Non labored respirations on room air, equal chest rise  Abdominal:      General: Abdomen is flat. There is no distension.      Palpations: Abdomen is soft.   Musculoskeletal:      Cervical back: Normal range of motion. No rigidity.      Right lower leg: No edema.      Left lower leg: No edema.   Skin:     General: Skin is warm and dry.      Capillary Refill: Capillary refill takes less than 2 seconds.   Neurological:      General: No focal deficit present.      Mental Status: He is alert and oriented to person, place, and time.   Psychiatric:         Mood and Affect: Mood normal.         Behavior: Behavior normal.         Results from last 7 days   Lab Units 05/24/25 1748 05/19/25 2216 05/19/25  1223   WBC 10*3/mm3 8.74 6.56 5.86   HEMOGLOBIN g/dL 12.7* 13.6 14.5   PLATELETS 10*3/mm3 247 258 250     Results from last 7 days   Lab Units 05/24/25 1748 05/19/25 2216 05/19/25  1223   SODIUM mmol/L 137 137 135*   POTASSIUM mmol/L 3.9 3.9 3.8   CHLORIDE mmol/L 102 102 98   CO2 mmol/L 22.4 24.7 27.0   BUN mg/dL 16 13 12   CREATININE mg/dL 1.15 1.26 1.21   GLUCOSE mg/dL 101* 107* 124*   Estimated Creatinine Clearance: 74.6 mL/min (by C-G formula based on SCr of 1.15 mg/dL).  Results from last 7 days   Lab Units 05/24/25 1748 05/19/25  1223   PROTIME Seconds 18.1* 21.7*   INR  1.50* 1.88*       Imaging Studies:  I personally and independently reviewed the CTA of the right arm from today.  The right subclavian, axillary, brachial, and ulnar arteries are all patent with no stenosis or dissection.  The right radial artery is widely patent into the hand.  There is an area of haziness/ inflammation around the artery near the wrist, which I presume is the puncture site.  If there is a pseudoaneurysm in this area it is very very small, certainly not large enough to merit operative intervention.        Data Points:  During this visit the following were done:  Labs Reviewed [x]    Labs Ordered []    Radiology Reports Reviewed [x]    Radiology Images Reviewed [x]    Radiology Ordered []    EKG, echo, and/or stress test reviewed [x]    Vascular lab results reviewed  []    Vascular lab images reviewed and interpreted per myself   [x]    Referring Provider Records Reviewed []    ER Records Reviewed []    Hospital Records Reviewed/Summarized [x]    History Obtained From Family []    Radiological images view and Interpreted per myself [x]    Case Discussed with referring provider []     Decision to obtain and request outside records  []    Total data points reviewed: 7      There are no hospital problems to display for this patient.        Assessment & Plan       * No active hospital problems. *        59 y.o. male with swelling at site of right radial puncture for LHC, evaluation for possible pseudoaneurysm    -CTA reviewed.  Inflammation, possible very small pseudoaneurysm in the distal radial artery at the puncture site.  Will apply TR band for 1 hour for compression.  Offered patient option to stay overnight and get follow up duplex tomorrow vs follow up in clinic on Thursday with duplex, he opted for Thursday.  He is to work on light duty and he is not to mow the lawn, work out, or help friends move until after his follow up appointment. He understands    -discussed TR band with nursing staff, who expressed understanding.    I discussed the patients findings and my recommendations with patient, family, nursing staff, and consulting provider.  Please call my office with any question: [phone]    Luisa Mcdonald MD  05/24/25  19:49 EDT    This note was created using Dragon dictation software.  I have reviewed the note for accuracy and made corrections as needed.  Please note that some errors may still exist, please contact me with any questions or concerns.

## 2025-05-24 NOTE — ED PROVIDER NOTES
Subjective   History of Present Illness  Patient is a 59-year-old male with past medical history significant for hyperlipidemia, saphenous thrombosis, and heart cath performed 8 days ago presenting to the ED with right wrist pain and swelling that started earlier today.  Patient reports that he has been feeling normal and went back to work at half-strength yesterday without difficulty.  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  He reports that he put pressure on the site for about 20 minutes, which reduce the swelling.  Patient states that this pain was not there before and he has never had anything quite like it.  Patient reports that the swelling has improved but it is  to touch.  He denies shortness of breath, chest pain, confusion, headache, dysarthria, facial drooping, gait changes, and other abnormalities.  He reports that the only symptoms he is experiencing are related to the right upper extremity.        Review of Systems   Neurological:  Negative for dizziness, light-headedness, numbness and headaches.       Past Medical History:   Diagnosis Date    Laryngopharyngeal reflux (LPR)        No Known Allergies    Past Surgical History:   Procedure Laterality Date    CARDIAC CATHETERIZATION N/A 5/16/2025    Procedure: Left Heart Cath radial;  Surgeon: Richard Moore DO;  Location: [facility] CATH INVASIVE LOCATION;  Service: Cardiovascular;  Laterality: N/A;    COLONOSCOPY      COLONOSCOPY N/A 1/29/2024    Procedure: COLONOSCOPY WITH POLYPECTOMY X1;  Surgeon: Tre Fitzpatrick MD;  Location: [facility] ENDOSCOPY;  Service: Gastroenterology;  Laterality: N/A;  POST: POLYP    ENDOSCOPY N/A 1/29/2024    Procedure: ESOPHAGOGASTRODUODENOSCOPY WITH BIOPSY X1 AREA AND DILATION UP TO 50;  Surgeon: Tre Fitzpatrick MD;  Location: [facility] ENDOSCOPY;  Service: Gastroenterology;   Laterality: N/A;  POST: GASTRITIS, ESOPHAGEAL STRICTURE       Family History   Problem Relation Age of Onset    Cancer Father        Social History     Socioeconomic History    Marital status: Single   Tobacco Use    Smoking status: Never    Smokeless tobacco: Never   Vaping Use    Vaping status: Never Used   Substance and Sexual Activity    Alcohol use: Yes     Comment: Occasional    Drug use: Never    Sexual activity: Defer           Objective   Physical Exam  Vitals and nursing note reviewed.   Constitutional:       General: He is not in acute distress.     Appearance: Normal appearance. He is normal weight. He is not ill-appearing, toxic-appearing or diaphoretic.   HENT:      Head: Normocephalic and atraumatic.      Right Ear: External ear normal.      Left Ear: External ear normal.      Nose: Nose normal.      Mouth/Throat:      Mouth: Mucous membranes are moist.   Eyes:      Extraocular Movements: Extraocular movements intact.      Pupils: Pupils are equal, round, and reactive to light.   Cardiovascular:      Rate and Rhythm: Normal rate and regular rhythm.      Pulses: Normal pulses.      Heart sounds: Normal heart sounds. No murmur heard.     No friction rub. No gallop.   Pulmonary:      Effort: Pulmonary effort is normal. No respiratory distress.      Breath sounds: Normal breath sounds. No stridor. No wheezing, rhonchi or rales.   Abdominal:      General: Abdomen is flat. Bowel sounds are normal. There is no distension.   Musculoskeletal:      Right elbow: Normal.      Left elbow: Normal.      Right forearm: Tenderness present. No edema or bony tenderness.      Left forearm: Normal.      Right wrist: Normal.      Left wrist: Normal.        Arms:       Cervical back: Normal range of motion and neck supple.   Skin:     General: Skin is warm and dry.      Capillary Refill: Capillary refill takes less than 2 seconds.      Coloration: Skin is not jaundiced.      Findings: Bruising and erythema present.       Comments: 1 cm area of tenderness around cardiac cath insertion point of the right forearm.    No streaking, discharge, or warmth   Neurological:      General: No focal deficit present.      Mental Status: He is alert.      Sensory: No sensory deficit.      Coordination: Coordination normal.      Gait: Gait normal.   Psychiatric:         Mood and Affect: Mood normal.         Behavior: Behavior normal.         Procedures           ED Course  ED Course as of 05/24/25 2051   Sat May 24, 2025   1906 Waiting on CTA   [ME]   1915 Updated patient on progress thus far   [ME]   1944 Discussed patient with Dr. Jung of vascular surgery, and she has seen patient and developed the plan, pending CTA results. [ME]      ED Course User Index  [ME] Bisi Diego PA-C      Labs Reviewed   BASIC METABOLIC PANEL - Abnormal; Notable for the following components:       Result Value    Glucose 101 (*)     All other components within normal limits    Narrative:     GFR Categories in Chronic Kidney Disease (CKD)              GFR Category          GFR (mL/min/1.73)    Interpretation  G1                    90 or greater        Normal or high (1)  G2                    60-89                Mild decrease (1)  G3a                   45-59                Mild to moderate decrease  G3b                   30-44                Moderate to severe decrease  G4                    15-29                Severe decrease  G5                    14 or less           Kidney failure    (1)In the absence of evidence of kidney disease, neither GFR category G1 or G2 fulfill the criteria for CKD.    eGFR calculation 2021 CKD-EPI creatinine equation, which does not include race as a factor   PROTIME-INR - Abnormal; Notable for the following components:    Protime 18.1 (*)     INR 1.50 (*)     All other components within normal limits   CBC WITH AUTO DIFFERENTIAL - Abnormal; Notable for the following components:    Hemoglobin 12.7 (*)     Lymphocyte % 15.6 (*)      All other components within normal limits   APTT - Normal   CBC AND DIFFERENTIAL    Narrative:     The following orders were created for panel order CBC & Differential.  Procedure                               Abnormality         Status                     ---------                               -----------         ------                     CBC Auto Differential[[id number]]        Abnormal            Final result                 Please view results for these tests on the individual orders.     CT Angiogram Upper Extremity Right  Result Date: 5/24/2025  Impression: 1. No evidence of pseudoaneurysm or thrombosis of the right upper extremity vasculature. Electronically Signed: Yannick Loyola MD  5/24/2025 8:13 PM EDT  Workstation ID: [id number]    Medications   iopamidol (ISOVUE-370) 76 % injection 100 mL (100 mL Intravenous Given 5/24/25 1818)                                                      Medical Decision Making  Patient is a 59-year-old male who presented with the above complaint.  He had the above evaluation and exam.    Imaging independently interpreted by Dr. Mcdonald and reviewed by myself.  CTA of the right upper extremity revealed no evidence of pseudoaneurysm or thrombosis.    Labs essentially negative, including BMP, PTT, CBC.    Dr. Mcdonald of vascular surgery was consulted and saw patient in the ED.  At reassessment, patient resting comfortably in no acute distress.  He is afebrile and hemodynamically stable.  There is no evidence of pseudoaneurysm or other thrombosis on imaging.  He was given options by Dr. Mcdonald for outpatient follow-up versus admission, and patient would like to go home and follow-up with Dr. Mcdonald on outpatient basis.  Return precautions were discussed.  Patient verbalized agreement and understanding.    Problems Addressed:  Post-op pain: acute illness or injury  Right arm pain: acute illness or injury    Amount and/or Complexity of Data Reviewed  Labs:  ordered.        Final diagnoses:   Right arm pain   Post-op pain       ED Disposition  ED Disposition       ED Disposition   Discharge    Condition   Stable    Comment   --               Luisa Mcdonald MD  [address] [place] [id number] [phone]    Follow up on 5/29/2025      Luisa Mcdonald MD  [facility] [address] [place] [id number] [phone]    Go on 5/29/2025  As scheduled         Medication List      No changes were made to your prescriptions during this visit.            Bisi Diego PA-C  05/24/25 2051

## 2025-05-24 NOTE — Clinical Note
[facility] EMERGENCY DEPARTMENT  [address] [place] [address]  Phone: [phone]    Flip Vang was seen and treated in our emergency department on 5/24/2025.  He may return to work on 05/29/2025.         Thank you for choosing [facility].    Bisi Diego PA-C

## 2025-05-25 NOTE — DISCHARGE INSTRUCTIONS
Follow-up with Dr. Mcdonald on Thursday as scheduled.  She has placed the order for your duplex for that day.    Follow-up precautions given by Dr. Mcdonald.    Return to the ER with new or worsening symptoms.

## 2025-05-29 ENCOUNTER — HOSPITAL ENCOUNTER (OUTPATIENT)
Dept: CARDIOLOGY | Facility: HOSPITAL | Age: 60
Discharge: HOME | End: 2025-05-29
Admitting: STUDENT IN AN ORGANIZED HEALTH CARE EDUCATION/TRAINING PROGRAM
Payer: COMMERCIAL

## 2025-05-29 DIAGNOSIS — M79.601 RIGHT ARM PAIN: ICD-10-CM

## 2025-05-29 LAB
BH CV RIGHT GROIN PSA PROCEDURE SCRIPTING LRR: 1
BH CV VAS RIGHT PSA RADIAL SYS: 83 CM/SEC

## 2025-05-29 PROCEDURE — 93926 LOWER EXTREMITY STUDY: CPT

## 2025-05-30 ENCOUNTER — OFFICE VISIT (OUTPATIENT)
Age: 60
End: 2025-05-30
Payer: COMMERCIAL

## 2025-05-30 VITALS
DIASTOLIC BLOOD PRESSURE: 83 MMHG | SYSTOLIC BLOOD PRESSURE: 159 MMHG | WEIGHT: 173 LBS | BODY MASS INDEX: 23.43 KG/M2 | HEIGHT: 72 IN

## 2025-05-30 DIAGNOSIS — I72.9 PSEUDOANEURYSM FOLLOWING PROCEDURE: Primary | ICD-10-CM

## 2025-05-30 DIAGNOSIS — T81.718A PSEUDOANEURYSM FOLLOWING PROCEDURE: Primary | ICD-10-CM

## 2025-05-30 LAB — BH CV ECHO SHUNT ASSESSMENT PERFORMED (HIDDEN SCRIPTING): 1

## 2025-05-31 PROBLEM — T81.718A PSEUDOANEURYSM FOLLOWING PROCEDURE: Status: ACTIVE | Noted: 2025-05-31

## 2025-05-31 PROBLEM — I72.9 PSEUDOANEURYSM FOLLOWING PROCEDURE: Status: ACTIVE | Noted: 2025-05-31

## 2025-05-31 NOTE — PROGRESS NOTES
Chief Complaint  Pseudoaneurysm    Subjective        Flip Vang presents to [facility] VASCULAR SURGERY  History of Present Illness  59 y.o. male returns for follow up of radial artery pseudoaneurysm post [facility].  Since his ER visit, he has had soreness of the left wrist and forearm, worse with activity.  His bruising has also worsened.  He has been off work since his ER visit.    Past Medical History:   Diagnosis Date    Laryngopharyngeal reflux (LPR)        Past Surgical History:   Procedure Laterality Date    CARDIAC CATHETERIZATION N/A 5/16/2025    Procedure: Left Heart Cath radial;  Surgeon: Richard Moore DO;  Location: [facility] CATH INVASIVE LOCATION;  Service: Cardiovascular;  Laterality: N/A;    COLONOSCOPY      COLONOSCOPY N/A 1/29/2024    Procedure: COLONOSCOPY WITH POLYPECTOMY X1;  Surgeon: Tre Fitzpatrick MD;  Location: [facility] ENDOSCOPY;  Service: Gastroenterology;  Laterality: N/A;  POST: POLYP    ENDOSCOPY N/A 1/29/2024    Procedure: ESOPHAGOGASTRODUODENOSCOPY WITH BIOPSY X1 AREA AND DILATION UP TO 50;  Surgeon: Tre Fitzpatrick MD;  Location: [facility] ENDOSCOPY;  Service: Gastroenterology;  Laterality: N/A;  POST: GASTRITIS, ESOPHAGEAL STRICTURE       Family History   Problem Relation Age of Onset    Cancer Father          Social History     Tobacco Use    Smoking status: Never     Passive exposure: Never    Smokeless tobacco: Never   Vaping Use    Vaping status: Never Used   Substance Use Topics    Alcohol use: Yes     Comment: Occasional    Drug use: Never       Allergies: Patient has no known allergies.    Prior to Admission medications    Medication Sig Start Date End Date Taking? Authorizing Provider   acetaminophen (TYLENOL) 325 MG tablet Take 2 tablets by mouth Every 6 (Six) Hours As Needed for Mild Pain.   Yes Provider, MD Lorenza   aspirin 81 MG EC tablet Take 1 tablet by mouth Daily. 5/21/25  Yes Cierra Cueva MD   atorvastatin (LIPITOR) 80 MG  "tablet Take 1 tablet by mouth Every Night. 5/20/25  Yes Cierra Cueva MD   omeprazole (priLOSEC) 20 MG capsule Take 1 capsule by mouth Daily. 12/24/23  Yes Mariella Mendoza APRN   rivaroxaban (XARELTO) 15 MG tablet Take 1 tablet by mouth 2 (Two) Times a Day With Meals. 5/19/25 6/8/25 Yes ProviderLorenza MD   rivaroxaban (XARELTO) 20 MG tablet Take 1 tablet by mouth Daily. 6/9/25  Yes Provider, MD Lorenza         Objective   Vital Signs:  /83 (BP Location: Left arm)   Ht 182.9 cm (72.01\")   Wt 78.5 kg (173 lb)   BMI 23.46 kg/m²   Estimated body mass index is 23.46 kg/m² as calculated from the following:    Height as of this encounter: 182.9 cm (72.01\").    Weight as of this encounter: 78.5 kg (173 lb).       BMI is within normal parameters. No other follow-up for BMI required.       Physical Exam  Vitals reviewed.   Constitutional:       General: He is not in acute distress.     Appearance: Normal appearance.   HENT:      Head: Normocephalic and atraumatic.      Right Ear: External ear normal.      Left Ear: External ear normal.      Nose: Nose normal.      Mouth/Throat:      Mouth: Mucous membranes are moist.      Pharynx: Oropharynx is clear.   Eyes:      Extraocular Movements: Extraocular movements intact.      Conjunctiva/sclera: Conjunctivae normal.      Pupils: Pupils are equal, round, and reactive to light.   Cardiovascular:      Rate and Rhythm: Normal rate and regular rhythm.      Pulses:           Carotid pulses are 2+ on the right side and 2+ on the left side.       Radial pulses are 2+ on the right side and 2+ on the left side.      Comments: No pulsatile mass at right wrist  + bruising of the right wrist and forearm  Pulmonary:      Comments: Non labored respirations on room air, equal chest rise  Abdominal:      General: Abdomen is flat. There is no distension.      Palpations: Abdomen is soft.   Musculoskeletal:      Cervical back: Normal range of motion. No rigidity.      Right " lower leg: No edema.      Left lower leg: No edema.   Skin:     General: Skin is warm and dry.      Capillary Refill: Capillary refill takes less than 2 seconds.   Neurological:      General: No focal deficit present.      Mental Status: He is alert and oriented to person, place, and time.   Psychiatric:         Mood and Affect: Mood normal.         Behavior: Behavior normal.        Result Review :            Last Echo  Results for orders placed during the hospital encounter of 05/19/25    Adult Transesophageal Echo (DARREN) W/ Cont if Necessary Per Protocol (Cardiology Department)    Interpretation Summary    Left ventricular systolic function is normal. Left ventricular ejection fraction appears to be 56 - 60%.    Left ventricular diastolic function was normal.    Saline test results are negative.      Results for orders placed during the hospital encounter of 05/14/25    Adult Transthoracic Echo Complete w/ Color, Spectral and Contrast if necessary per protocol    Interpretation Summary    Left ventricular systolic function is normal. Calculated left ventricular EF = 55.3%    Left ventricular diastolic function was normal.    Estimated right ventricular systolic pressure from tricuspid regurgitation is normal (<35 mmHg).       Last Stress  Results for orders placed during the hospital encounter of 05/14/25    Stress Test With Myocardial Perfusion One Day    Interpretation Summary    Myocardial perfusion imaging indicates a normal myocardial perfusion study with no evidence of ischemia. Impressions are consistent with a low risk study.    Left ventricular ejection fraction is normal (Calculated EF = 63%).    Low risk for ischemic heart disease.    This is normal Cardiolite imaging stress test with no evidence of ischemia or myocardial infarction.  Left ventricle size and function is normal on gated SPECT imaging.  No wall motion abnormality was noted.  Clinical correlation recommended.  Further recommendation as per  ordering physician. .    Findings consistent with a normal ECG stress test.       Last Cath  Results for orders placed during the hospital encounter of 25    Cardiac Catheterization/Vascular Study    Conclusion  Table formatting from the original result was not included.  Cardiac Catheterization Operative Report  PATIENT IDENTIFICATION  Name: Flip Vang  Age: 59 y.o. Sex: male : 1965  MRN: [id number]    Date: 2025  PCP: @PCP@    Requesting Provider  [unfilled]    He underwent cardiac catheterization.    Indications for the procedure include: chest pain.    Procedure Details:  The risks, benefits, complications, treatment options, and expected outcomes were discussed with the patient. The patient and/or family concurred with the proposed plan, giving informed consent.    After informed consent the patient was brought to the cath lab after appropriate IV hydration was begun and oral premedication was given. He was further sedated with fentanyl and midazolam. He was prepped and draped in the usual manner. Using the modified Seldinger access technique, a 6 Kazakh sheath was placed in the radial artery. A left heart catheterization with coronary arteriography was performed. Findings are discussed below.    After the procedure was completed, sedation was stopped and the sheaths and catheters were all removed. Hemostasis was achieved per established hospital protocols.    Conscious sedation:  Conscious sedation was performed according to protocol.  I supervised and directed an independent trained observer with the assistance of monitoring the patient's level of consciousness and physiologic status throughout the procedure.  Intraoperative service time was 39 minutes.    Findings:    Hemodynamics LVEDP: 12  LV: 127/5  AO: 133/80  Left Main No significant disease  RCA No significant disease  LAD No significant disease  Circ No significant disease  SVG(s) Not applicable  ALMAS Not applicable  LV Normal left  ventricular systolic function ejection fraction 60-65%  Coronary Dominance RCA    Estimated Blood Loss:  Minimal    Specimens: None    Complications:  None; patient tolerated the procedure well.    Disposition: PACU - hemodynamically stable    Condition: stable    Impressions:  No angiographic evidence for significant epicardial occlusive disease  Normal left ventricular systolic function ejection fraction 60 to 65%    Recommendations:  Medical therapy and risk factor modification                Assessment and Plan     Diagnoses and all orders for this visit:    1. Pseudoaneurysm following procedure (Primary)  -     Duplex Pseudoaneurysm CAR; Future      Flip Vang is a 59 year old male who returns for follow up for a right radial pseudoaneurysm post [facility].  This was treated with compression with a TR band in the ER.  Follow up duplex today showed a thrombosed small pseudoaneurysm and a patent radial artery.  He is on full dose anticoagulation.  He has increased bruising, which I assured him is normal.  He is extremely active and I have discussed activity level with him.  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  I will plan to follow up with him in 1 month with another US for surveillance to ensure complete resolution of his bruising and the pseudoaneurysm.       Follow Up     Return in about 1 month (around 6/30/2025).  Patient was given instructions and counseling regarding his condition or for health maintenance advice. Please see specific information pulled into the AVS if appropriate.   Any information in this note that has been copied and pasted was reviewed and edited to reflect today's exam.  This note was created using Dragon dictation software.  I have reviewed the note for accuracy and made corrections as needed.  Please note that some errors may still exist, please contact me with any questions or concerns.

## 2025-06-10 LAB
CV ZIO BASELINE AVG BPM: 87 BPM
CV ZIO BASELINE BPM HIGH: 250 BPM
CV ZIO BASELINE BPM LOW: 58 BPM
CV ZIO DEVICE ANALYSIS TIME: NORMAL
CV ZIO ECT SVE COUNT: 216 EPISODES
CV ZIO ECT SVE CPLT COUNT: 2 EPISODES
CV ZIO ECT SVE CPLT FREQ: NORMAL
CV ZIO ECT SVE FREQ: NORMAL
CV ZIO ECT SVE TPLT COUNT: 2 EPISODES
CV ZIO ECT SVE TPLT FREQ: NORMAL
CV ZIO ECT VE COUNT: 2758 EPISODES
CV ZIO ECT VE CPLT COUNT: 2 EPISODES
CV ZIO ECT VE CPLT FREQ: NORMAL
CV ZIO ECT VE FREQ: NORMAL
CV ZIO ECT VE TPLT COUNT: 0 EPISODES
CV ZIO ECT VE TPLT FREQ: 0
CV ZIO ECTOPIC SVE COUPLET RAW PERCENT: 0 %
CV ZIO ECTOPIC SVE ISOLATED PERCENT: 0.01 %
CV ZIO ECTOPIC SVE TRIPLET RAW PERCENT: 0 %
CV ZIO ECTOPIC VE COUPLET RAW PERCENT: 0 %
CV ZIO ECTOPIC VE ISOLATED PERCENT: 0.18 %
CV ZIO ECTOPIC VE TRIPLET RAW PERCENT: 0 %
CV ZIO ENROLLMENT END: NORMAL
CV ZIO ENROLLMENT START: NORMAL
CV ZIO L BIGEMINY DUR: 5.6 SEC
CV ZIO L BIGEMINY END: NORMAL
CV ZIO L BIGEMINY START: NORMAL
CV ZIO PATIENT EVENTS DIARIES: 0
CV ZIO PATIENT EVENTS TRIGGERS: 11
CV ZIO PAUSE COUNT: 0
CV ZIO PRESCRIPTION STATUS: NORMAL
CV ZIO SVT AVG BPM: 150 BPM
CV ZIO SVT BPM HIGH: 250 BPM
CV ZIO SVT BPM LOW: 96 BPM
CV ZIO SVT COUNT: 2
CV ZIO SVT F EPI AVG BPM: 197 BPM
CV ZIO SVT F EPI BEATS: 20 BEATS
CV ZIO SVT F EPI BPM HIGH: 250 BPM
CV ZIO SVT F EPI BPM LOW: 141 BPM
CV ZIO SVT F EPI DUR: 6.3 SEC
CV ZIO SVT F EPI END: NORMAL
CV ZIO SVT F EPI START: NORMAL
CV ZIO SVT L EPI AVG BPM: 197 BPM
CV ZIO SVT L EPI BEATS: 20 BEATS
CV ZIO SVT L EPI BPM HIGH: 250 BPM
CV ZIO SVT L EPI BPM LOW: 141 BPM
CV ZIO SVT L EPI DUR: 6.3 SEC
CV ZIO SVT L EPI END: NORMAL
CV ZIO SVT L EPI START: NORMAL
CV ZIO SVT SYMPT IN PT: NORMAL
CV ZIO TOTAL  ENROLLMENT PERIOD: NORMAL
CV ZIO VT COUNT: 0

## 2025-06-17 NOTE — PROGRESS NOTES
"  HEMATOLOGY ONCOLOGY OUTPATIENT FOLLOW UP       Patient name: Flip Vang  : 1965  MRN: [id number]  Primary Care Physician: Aristeo Rodriguez MD  Referring Physician: No ref. 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 Carrillo who has been in the hospital twice in the recent past. The first time he came complaining of chest pains, dyspnea and palpitations. He underwent several investigations, including a cardiac catheterization. Imaging revealed calcifications of the aorta. He was discharged with improvement of his symptoms and no indication of severe coronary artery disease. Approximately 12 hours later he developed visual symptoms out of the left eye. He describes some loss of the lateral peripheral vision. He was seen by his optometrist who identified several areas of infarction in his left retina. He was admitted with concern of stroke. This was ruled out at the time of admission. 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 I was asked to see him for \"hypercoagulable condition\". He has no previous history of thrombosis and has never needed anticoagulation. He has no family history of thrombosis although one of his sisters had, reportedly, an ischemic stroke at a young age. On exam he is a well-built man who does not seem in distress. He is conversant and oriented. There is no jaundice or pallor. The gaze is conjugate and the pupils are equally reactive to light. No oral lesions. No palpable lymphadenopathy. The lungs are clear bilaterally and the heart regular. Abdomen is soft. No edema. Laboratory exams reviewed. I have requested lupus anticoagulant and anticardiolipin and antibeta 2 glycoprotein antibodies only because these could change the conduct. However it is well-described that coronary catheterization can result in embolic stroke " and embolic occlusion of the retinal artery. The frequency of these complications is not high, however, it is a well-known potential problem of any arterial catheter procedures. It is actually more common in coronary angiogram, particularly with the transradial route. I do not believe that the possible chronic thrombophlebitis of the saphenous veins and the retinal artery occlusion are related and I do not believe that he needs anticoagulation. He has been started on rivaroxaban which he can continue for the next 3 to 4 weeks and then discontinue unless the lupus anticoagulant is positive, in which case he would need a different anticoagulant. Discussed at length with him and with his family.    6/19/2025: In the office for follow-up.  He persists with the same visual changes and has had no improvement but is not any worse either.  He has been afebrile.  He has continued to experience discomfort on the anterior chest.  It is not particularly different as compared to before and he does not know whether this is the result of pyrosis secondary to reflux or anxiety.  He has minimal dyspnea.  He has not been coughing.  He denies any chest pains or abdominal pain.  Has returned to his usual activities though he has not working yet.  No edema.  On exam he is alert and conversant.  Oriented.  No distress no jaundice.  Lungs clear bilaterally.  Heart regular.  Abdomen soft and without hepatomegaly or splenomegaly.  No edema.  Laboratory exams reviewed and discussed with him.  While the anticardiolipin and antibeta 2 glycoprotein antibody testing is negative the lupus anticoagulant is reported positive.  Whether this is the result of the timing of the collection of the sample or he indeed has lupus anticoagulant positivity is unclear at this time.  It is important to notice that his aPTT is well within the normal range.  Explained the importance of follow-up.  For now continue on the same anticoagulant.  Sent a new  prescription to his pharmacy.    Past Medical History:   Diagnosis Date    Laryngopharyngeal reflux (LPR)      Past Surgical History:   Procedure Laterality Date    CARDIAC CATHETERIZATION N/A 5/16/2025    Procedure: Left Heart Cath radial;  Surgeon: Richard Moore DO;  Location: [facility] CATH INVASIVE LOCATION;  Service: Cardiovascular;  Laterality: N/A;    COLONOSCOPY      COLONOSCOPY N/A 1/29/2024    Procedure: COLONOSCOPY WITH POLYPECTOMY X1;  Surgeon: Tre Fitzpatrick MD;  Location: [facility] ENDOSCOPY;  Service: Gastroenterology;  Laterality: N/A;  POST: POLYP    ENDOSCOPY N/A 1/29/2024    Procedure: ESOPHAGOGASTRODUODENOSCOPY WITH BIOPSY X1 AREA AND DILATION UP TO 50;  Surgeon: Tre Fitzpatrick MD;  Location: [facility] ENDOSCOPY;  Service: Gastroenterology;  Laterality: N/A;  POST: GASTRITIS, ESOPHAGEAL STRICTURE       Current Outpatient Medications:     acetaminophen (TYLENOL) 325 MG tablet, Take 2 tablets by mouth Every 6 (Six) Hours As Needed for Mild Pain., Disp: , Rfl:     aspirin 81 MG EC tablet, Take 1 tablet by mouth Daily., Disp: 30 tablet, Rfl: 5    atorvastatin (LIPITOR) 80 MG tablet, Take 1 tablet by mouth Every Night., Disp: 90 tablet, Rfl: 3    omeprazole (priLOSEC) 20 MG capsule, Take 1 capsule by mouth Daily., Disp: 20 capsule, Rfl: 0    rivaroxaban (XARELTO) 20 MG tablet, Take 1 tablet by mouth Daily., Disp: 30 tablet, Rfl: 3    No Known Allergies    Family History   Problem Relation Age of Onset    Cancer Father      Cancer-related family history includes Cancer in his father.    Social History     Tobacco Use    Smoking status: Never     Passive exposure: Never    Smokeless tobacco: Never   Vaping Use    Vaping status: Never Used   Substance Use Topics    Alcohol use: Yes     Comment: Occasional    Drug use: Never     Social History     Social History Narrative    Not on file      ROS:   Review of Systems   Constitutional:  Negative for activity change, appetite change,  "chills, diaphoresis, fatigue, fever and unexpected weight change.   HENT:  Negative for congestion, dental problem, drooling, ear discharge, ear pain, facial swelling, hearing loss, mouth sores, nosebleeds, postnasal drip, rhinorrhea, sinus pressure, sinus pain, sneezing, sore throat, tinnitus, trouble swallowing and voice change.    Eyes:  Negative for photophobia, pain, discharge, redness, itching and visual disturbance.   Respiratory:  Negative for apnea, cough, choking, chest tightness, shortness of breath, wheezing and stridor.    Cardiovascular:  Negative for chest pain, palpitations and leg swelling.   Gastrointestinal:  Negative for abdominal distention, abdominal pain, anal bleeding, blood in stool, constipation, diarrhea, nausea, rectal pain and vomiting.   Endocrine: Negative for cold intolerance, heat intolerance, polydipsia and polyuria.   Genitourinary:  Negative for decreased urine volume, difficulty urinating, dysuria, flank pain, frequency, genital sores, hematuria and urgency.   Musculoskeletal:  Negative for arthralgias, back pain, gait problem, joint swelling, myalgias, neck pain and neck stiffness.   Skin:  Negative for color change, pallor and rash.   Neurological:  Negative for dizziness, tremors, seizures, syncope, facial asymmetry, speech difficulty, weakness, light-headedness, numbness and headaches.        Loss of the lateral field of vision on the left.   Hematological:  Negative for adenopathy. Does not bruise/bleed easily.   Psychiatric/Behavioral:  Negative for agitation, behavioral problems, confusion, decreased concentration, hallucinations, self-injury, sleep disturbance and suicidal ideas. The patient is not nervous/anxious.      Objective:    Vital signs:  Vitals:    06/19/25 1400   BP: 120/73   Pulse: 78   SpO2: 98%   Weight: 77.2 kg (170 lb 4.8 oz)   Height: 182.9 cm (72.01\")   PainSc: 0-No pain     Body mass index is 23.09 kg/m².    ECOG Status  (0) Fully active, able to carry " on all predisease performance without restriction    Physical Exam:   Physical Exam  Constitutional:       General: He is not in acute distress.     Appearance: He is not ill-appearing, toxic-appearing or diaphoretic.   HENT:      Head: Normocephalic and atraumatic.      Right Ear: External ear normal.      Left Ear: External ear normal.      Nose: Nose normal.      Mouth/Throat:      Mouth: Mucous membranes are moist.      Pharynx: Oropharynx is clear.   Eyes:      General: No scleral icterus.        Right eye: No discharge.         Left eye: No discharge.      Conjunctiva/sclera: Conjunctivae normal.      Pupils: Pupils are equal, round, and reactive to light.   Cardiovascular:      Rate and Rhythm: Normal rate and regular rhythm.      Pulses: Normal pulses.      Heart sounds: Normal heart sounds. No murmur heard.     No friction rub. No gallop.   Pulmonary:      Effort: No respiratory distress.      Breath sounds: No stridor. No wheezing, rhonchi or rales.   Chest:      Chest wall: No tenderness.   Abdominal:      General: Abdomen is flat. Bowel sounds are normal. There is no distension.      Palpations: Abdomen is soft. There is no mass.      Tenderness: There is no abdominal tenderness. There is no right CVA tenderness, left CVA tenderness, guarding or rebound.   Musculoskeletal:         General: No tenderness, deformity or signs of injury.      Cervical back: No rigidity.      Right lower leg: No edema.      Left lower leg: No edema.   Lymphadenopathy:      Cervical: No cervical adenopathy.   Skin:     General: Skin is warm and dry.      Coloration: Skin is not jaundiced or pale.      Findings: No bruising or rash.   Neurological:      General: No focal deficit present.      Mental Status: He is alert and oriented to person, place, and time.      Cranial Nerves: No cranial nerve deficit.   Psychiatric:         Mood and Affect: Mood normal.         Behavior: Behavior normal.         Thought Content: Thought  content normal.         Judgment: Judgment normal.     I Gonzalo Kearney MD performed the physical exam on 6/19/2025 as documented above.    Lab Results - Last 18 Months   Lab Units 06/19/25  1406 05/24/25  1748 05/19/25  2216   WBC 10*3/mm3 7.73 8.74 6.56   HEMOGLOBIN g/dL 12.5* 12.7* 13.6   HEMATOCRIT % 38.3 37.9 40.9   PLATELETS 10*3/mm3 249 247 258   MCV fL 95.5 90.5 90.9     Lab Results - Last 18 Months   Lab Units 05/24/25  1748 05/19/25  2216 05/19/25  1223 05/15/25  0041 05/14/25 2015 12/24/23  1933   SODIUM mmol/L 137 137 135*   < > 140 140   POTASSIUM mmol/L 3.9 3.9 3.8   < > 4.7 3.5   CHLORIDE mmol/L 102 102 98   < > 104 101   CO2 mmol/L 22.4 24.7 27.0   < > 27.7 22.0   BUN mg/dL 16 13 12   < > 14 16   CREATININE mg/dL 1.15 1.26 1.21   < > 1.32* 1.19   CALCIUM mg/dL 9.0 9.0 9.5   < > 9.5 9.8   BILIRUBIN mg/dL  --   --  0.5  --  0.4 0.6   ALK PHOS U/L  --   --  90  --  95 81   ALT (SGPT) U/L  --   --  18  --  17 15   AST (SGOT) U/L  --   --  22  --  23 27   GLUCOSE mg/dL 101* 107* 124*   < > 102* 116*    < > = values in this interval not displayed.     Lab Results   Component Value Date    GLUCOSE 101 (H) 05/24/2025    BUN 16 05/24/2025    CREATININE 1.15 05/24/2025    BCR 13.9 05/24/2025    K 3.9 05/24/2025    CO2 22.4 05/24/2025    CALCIUM 9.0 05/24/2025    ALBUMIN 4.8 05/19/2025    AST 22 05/19/2025    ALT 18 05/19/2025     Lab Results - Last 18 Months   Lab Units 05/24/25  1748 05/19/25  1223 05/16/25  0426   INR  1.50* 1.88* 1.04   APTT seconds 32.8  --   --      Lab Results   Component Value Date    FOLATE 8.37 05/20/2025     Lab Results   Component Value Date    DADVEGQL87 838 05/19/2025     Lab Results   Component Value Date    SEDRATE 3 05/19/2025     Lab Results   Component Value Date    PTT 32.8 05/24/2025    INR 1.50 (H) 05/24/2025     Lab Results   Component Value Date    SEDRATE 3 05/19/2025     Assessment & Plan     ASSESSMENT  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  Again discussed with him.  While this is not a common complication, it is a well-described complication of coronary catheter procedures.  This does not have a pattern consistent with an increased predisposition to thrombosis even when chronic thrombosis of distal veins of the left lower extremity was reported on a recent Doppler.  The clinical picture is most consistent with atheroembolism.  However, because of the positivity of the lupus anticoagulant I believe that closer follow-up and repeat testing are in order.  For now continue on the same anticoagulant and see me with new results.  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  Reviewed all the laboratory exams and imaging studies.  See me in approximately 12 weeks with additional laboratory exams.    Gonzalo Kearney MD on 6/19/2025 at 1528.

## 2025-06-19 ENCOUNTER — OFFICE VISIT (OUTPATIENT)
Dept: ONCOLOGY | Facility: CLINIC | Age: 60
End: 2025-06-19
Payer: COMMERCIAL

## 2025-06-19 ENCOUNTER — APPOINTMENT (OUTPATIENT)
Dept: LAB | Facility: HOSPITAL | Age: 60
End: 2025-06-19
Payer: COMMERCIAL

## 2025-06-19 VITALS
BODY MASS INDEX: 23.07 KG/M2 | OXYGEN SATURATION: 98 % | DIASTOLIC BLOOD PRESSURE: 73 MMHG | HEART RATE: 78 BPM | WEIGHT: 170.3 LBS | HEIGHT: 72 IN | SYSTOLIC BLOOD PRESSURE: 120 MMHG

## 2025-06-19 DIAGNOSIS — K21.9 LARYNGOPHARYNGEAL REFLUX (LPR): Primary | ICD-10-CM

## 2025-06-19 DIAGNOSIS — R76.0 LUPUS ANTICOAGULANT POSITIVE: ICD-10-CM

## 2025-06-19 DIAGNOSIS — H34.232 RETINAL ARTERIAL BRANCH OCCLUSION, LEFT: ICD-10-CM

## 2025-06-19 LAB
BASOPHILS # BLD AUTO: 0.05 10*3/MM3 (ref 0–0.2)
BASOPHILS NFR BLD AUTO: 0.6 % (ref 0–1.5)
DEPRECATED RDW RBC AUTO: 45.1 FL (ref 37–54)
EOSINOPHIL # BLD AUTO: 0.2 10*3/MM3 (ref 0–0.4)
EOSINOPHIL NFR BLD AUTO: 2.6 % (ref 0.3–6.2)
ERYTHROCYTE [DISTWIDTH] IN BLOOD BY AUTOMATED COUNT: 13.3 % (ref 12.3–15.4)
HCT VFR BLD AUTO: 38.3 % (ref 37.5–51)
HGB BLD-MCNC: 12.5 G/DL (ref 13–17.7)
HOLD SPECIMEN: NORMAL
HOLD SPECIMEN: NORMAL
LYMPHOCYTES # BLD AUTO: 1.66 10*3/MM3 (ref 0.7–3.1)
LYMPHOCYTES NFR BLD AUTO: 21.5 % (ref 19.6–45.3)
MCH RBC QN AUTO: 31.2 PG (ref 26.6–33)
MCHC RBC AUTO-ENTMCNC: 32.6 G/DL (ref 31.5–35.7)
MCV RBC AUTO: 95.5 FL (ref 79–97)
MONOCYTES # BLD AUTO: 0.66 10*3/MM3 (ref 0.1–0.9)
MONOCYTES NFR BLD AUTO: 8.5 % (ref 5–12)
NEUTROPHILS NFR BLD AUTO: 5.16 10*3/MM3 (ref 1.7–7)
NEUTROPHILS NFR BLD AUTO: 66.8 % (ref 42.7–76)
PLATELET # BLD AUTO: 249 10*3/MM3 (ref 140–450)
PMV BLD AUTO: 9.2 FL (ref 6–12)
RBC # BLD AUTO: 4.01 10*6/MM3 (ref 4.14–5.8)
WBC NRBC COR # BLD AUTO: 7.73 10*3/MM3 (ref 3.4–10.8)

## 2025-06-19 PROCEDURE — 36415 COLL VENOUS BLD VENIPUNCTURE: CPT

## 2025-06-19 PROCEDURE — 85025 COMPLETE CBC W/AUTO DIFF WBC: CPT | Performed by: INTERNAL MEDICINE

## 2025-06-20 ENCOUNTER — TELEPHONE (OUTPATIENT)
Dept: CARDIOLOGY | Facility: CLINIC | Age: 60
End: 2025-06-20
Payer: COMMERCIAL

## 2025-06-20 ENCOUNTER — OFFICE VISIT (OUTPATIENT)
Dept: CARDIOLOGY | Facility: CLINIC | Age: 60
End: 2025-06-20
Payer: COMMERCIAL

## 2025-06-20 VITALS
BODY MASS INDEX: 20.31 KG/M2 | SYSTOLIC BLOOD PRESSURE: 133 MMHG | HEIGHT: 77 IN | OXYGEN SATURATION: 99 % | WEIGHT: 172 LBS | DIASTOLIC BLOOD PRESSURE: 82 MMHG | HEART RATE: 78 BPM

## 2025-06-20 DIAGNOSIS — H53.9 VISUAL DISTURBANCE: ICD-10-CM

## 2025-06-20 DIAGNOSIS — H34.9 RETINAL ARTERY OCCLUSION: Primary | ICD-10-CM

## 2025-06-20 DIAGNOSIS — E78.2 MIXED HYPERLIPIDEMIA: ICD-10-CM

## 2025-06-20 NOTE — TELEPHONE ENCOUNTER
Caller: Flip Vang    Relationship: Self    Best call back number: [phone]     What was the call regarding: PT CALLING AS HE HAD NOT HEARD FROM OFFICE ABOUT A 4 WEEK FU FROM HIS APPT TODAY WITH DR WILKINS SPECIFICALLY - HE STATES ON CHECK OUT HE WAS ADVISED THEY NEEDED TO HAVE HIM WORKED IN AND THEY WOULD FU WITH AN APPT - NO DIRECTION SHOWING - PLEASE ADVISE

## 2025-06-20 NOTE — PROGRESS NOTES
[facility]      REASON FOR FOLLOW-UP:            Chief Complaint   Patient presents with    Heart Problem         Dear Aristeo Rodriguez MD        History of Present Illness   It was my pleasure to see Flip Vang in the office today.  He is a 60-year-old male who presented to the emergency room at [facility] on May 14, 2025 with complaints of chest pain and dyspnea.  He reported feeling tired, dizzy and having some left arm numbness and tingling.  His blood pressure was elevated at home.  Patient had recent EGD due to some epigastric and abdominal discomfort.     Patient underwent stress Myoview today stress Myoview showed no evidence of ischemia.  Patient had echocardiogram done today which showed his EF to be 55%.  There was mild MR otherwise no significant valvular flow abnormalities.     In October 2024 patient had a CT calcium score that was 39.2.  39.2 was in the LAD other arteries were 0.     Patient is treated for hypertension and takes lisinopril at home.     On admission MI was ruled out.  TSH was within normal limits.  Total cholesterol 222, HDL 56  triglycerides 161.  Hemoglobin was 11.5 D-dimer was elevated at 1.38 CTA of his chest PE protocol was negative for PE.  There was no acute intrathoracic process.  He subsequently underwent heart catheterization on 5/16/2025 via right radial approach with no angiographic evidence for significant epicardial occlusive disease and normal LV systolic function with EF 60-65%.  Medical therapy and risk factor modification were recommended.    He presented back to the hospital after awakening the next day with visual symptoms in his left eye.  He was seen by his ophthalmologist and examination was suggestive of retinal artery occlusion and possible embolic phenomena.  Neurology evaluated the patient-MRI brain showed no defects.  Transesophageal echocardiogram was performed on 5/30/2025 with negative saline test, no  evidence of left atrial thrombus or mass.  Left atrial appendage was multi lobar in nature best described as windsock with no evidence of left atrial appendage thrombus.  He presented to the ED on 5/24/2025 with complaint of sharp pain in his left forearm at cath site followed by significant swelling.  He had been feeling well and went back to work at half-strength without difficulty.  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  He applied pressure to the site for about 20 minutes which did decrease the swelling.  He was seen by vascular surgeon Dr. Mcdonald.  Right upper extremity CTA was performed with no evidence of pseudoaneurysm or thrombus.  The patient was discharged home was seen in outpatient follow-up 5/30/2025.  Note reviewed: Follow-up duplex showed a thrombosed small pseudoaneurysm and patent radial artery.  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  Follow-up 1 month for another ultrasound for surveillance to ensure complete resolution of the bruising and pseudoaneurysm.    Patient had been fitted with ambulatory monitor to evaluate for any evidence of abnormal heart rhythm that may have contributed to his symptoms.  Results were reviewed with the patient today:    Basic rhythm is sinus with a min HR of 58 bpm, max HR of 250 bpm, and avg HR of 87 bpm.  2 Supraventricular Tachycardia runs occurred, the run with the fastest interval lasting 20 beats with a max rate of 250 bpm (avg 197 bpm); the run with the fastest interval was also the longest.  Some episodes of Supraventricular Tachycardia conducted with possible aberrancy.  Supraventricular Tachycardia was detected within +/- 45 seconds of symptomatic patient event(s).  Isolated VEs were rare (<1.0%), VE Couplets were rare (<1.0%), and no VE Triplets were present.  Rare Ventricular Bigeminy was present.     No pauses were noted.     No evidence for AV  blocks was seen.    Patient also underwent hypercoagulable workup which was reviewed-dilute viper venom time elevated at 9.2 seconds.      ASSESSMENT:  Recent suspected retinal artery occlusion with embolic phenomena  SVT on ambulatory monitor-?  Paroxysmal atrial fibrillation  Primary hypertension  Dyslipidemia    PLAN:  Recommend patient continue full anticoagulation.  We will schedule him follow-up appointment with cardiologist.  Continue statin, aspirin.  Monitor for any abnormal bleeding.        CHF Guideline Directed Medical Therapy  Beta Blocker:   ARNI/ACE/ARB:   SGLT 2 inhibitors:   Diuretics:   Aldosterone Antagonist:   Vasodilators & Nitrates:       Diagnoses and all orders for this visit:    1. Retinal artery occlusion (Primary)    2. Visual disturbance    3. Mixed hyperlipidemia          The following portions of the patient's history were reviewed and updated as appropriate: allergies, current medications, past family history, past medical history, past social history, past surgical history, and problem list.    REVIEW OF SYSTEMS:    Review of Systems   Eyes:         Visual disturbance left eye   All other systems reviewed and are negative.      Vitals:    06/20/25 1010   BP: 133/82   Pulse: 78   SpO2: 99%         PHYSICAL EXAM:    General: Alert, cooperative, no distress, appears stated age  Head:  Normocephalic, atraumatic, mucous membranes moist  Eyes:  Conjunctiva/corneas clear, EOM's intact     Neck:  Supple,  no JVD or bruit     Lungs: Clear to auscultation bilaterally, no wheezes rhonchi rales are noted  Chest wall: No tenderness  Musculoskeletal:   Ambulates freely without assistance  Heart::  Regular rate and rhythm, S1 and S2 normal, no murmur, rub or gallop  Abdomen: Soft, non-tender, nondistended, bowel sounds active, no abdominal bruit  Extremities: No cyanosis, clubbing, or edema   Pulses: 2+ and symmetric all extremities  Skin:  No rashes or lesions  Neuro/psych: A&O x3. CN II through  "XII are grossly intact with appropriate affect        Past Medical History:   Diagnosis Date    Laryngopharyngeal reflux (LPR)        Past Surgical History:   Procedure Laterality Date    CARDIAC CATHETERIZATION N/A 5/16/2025    Procedure: Left Heart Cath radial;  Surgeon: Richard Moore DO;  Location: [facility] CATH INVASIVE LOCATION;  Service: Cardiovascular;  Laterality: N/A;    COLONOSCOPY      COLONOSCOPY N/A 1/29/2024    Procedure: COLONOSCOPY WITH POLYPECTOMY X1;  Surgeon: Tre Fitzpatrick MD;  Location: [facility] ENDOSCOPY;  Service: Gastroenterology;  Laterality: N/A;  POST: POLYP    ENDOSCOPY N/A 1/29/2024    Procedure: ESOPHAGOGASTRODUODENOSCOPY WITH BIOPSY X1 AREA AND DILATION UP TO 50;  Surgeon: Tre Fitzpatrick MD;  Location: [facility] ENDOSCOPY;  Service: Gastroenterology;  Laterality: N/A;  POST: GASTRITIS, ESOPHAGEAL STRICTURE         Current Outpatient Medications:     acetaminophen (TYLENOL) 325 MG tablet, Take 2 tablets by mouth Every 6 (Six) Hours As Needed for Mild Pain., Disp: , Rfl:     aspirin 81 MG EC tablet, Take 1 tablet by mouth Daily., Disp: 30 tablet, Rfl: 5    atorvastatin (LIPITOR) 80 MG tablet, Take 1 tablet by mouth Every Night., Disp: 90 tablet, Rfl: 3    omeprazole (priLOSEC) 20 MG capsule, Take 1 capsule by mouth Daily., Disp: 20 capsule, Rfl: 0    rivaroxaban (XARELTO) 20 MG tablet, Take 1 tablet by mouth Daily., Disp: 30 tablet, Rfl: 3    No Known Allergies    Family History   Problem Relation Age of Onset    Cancer Father        Social History     Tobacco Use    Smoking status: Never     Passive exposure: Never    Smokeless tobacco: Never   Substance Use Topics    Alcohol use: Yes     Comment: Occasional           Current Electrocardiogram:  Procedures        EMR Dragon/Transcription:   \"Dictated utilizing Dragon dictation\".     Copied text in this note has been reviewed by me and is accurate as of 06/22/25.    "

## 2025-07-03 ENCOUNTER — HOSPITAL ENCOUNTER (OUTPATIENT)
Facility: HOSPITAL | Age: 60
Discharge: HOME | End: 2025-07-07
Attending: INTERNAL MEDICINE | Admitting: INTERNAL MEDICINE
Payer: COMMERCIAL

## 2025-07-03 ENCOUNTER — OFFICE VISIT (OUTPATIENT)
Age: 60
End: 2025-07-03
Payer: COMMERCIAL

## 2025-07-03 ENCOUNTER — APPOINTMENT (OUTPATIENT)
Dept: CT IMAGING | Facility: HOSPITAL | Age: 60
End: 2025-07-03
Payer: COMMERCIAL

## 2025-07-03 ENCOUNTER — HOSPITAL ENCOUNTER (OUTPATIENT)
Dept: CARDIOLOGY | Facility: HOSPITAL | Age: 60
Discharge: HOME | End: 2025-07-03
Admitting: STUDENT IN AN ORGANIZED HEALTH CARE EDUCATION/TRAINING PROGRAM
Payer: COMMERCIAL

## 2025-07-03 VITALS
BODY MASS INDEX: 20.31 KG/M2 | SYSTOLIC BLOOD PRESSURE: 136 MMHG | WEIGHT: 172 LBS | HEIGHT: 77 IN | DIASTOLIC BLOOD PRESSURE: 67 MMHG

## 2025-07-03 DIAGNOSIS — R10.84 GENERALIZED ABDOMINAL PAIN: Primary | ICD-10-CM

## 2025-07-03 DIAGNOSIS — R19.5 DARK STOOLS: ICD-10-CM

## 2025-07-03 DIAGNOSIS — K92.1 MELENA: ICD-10-CM

## 2025-07-03 DIAGNOSIS — I72.9 PSEUDOANEURYSM FOLLOWING PROCEDURE: Primary | ICD-10-CM

## 2025-07-03 DIAGNOSIS — I72.9 PSEUDOANEURYSM FOLLOWING PROCEDURE: ICD-10-CM

## 2025-07-03 DIAGNOSIS — T81.718A PSEUDOANEURYSM FOLLOWING PROCEDURE: ICD-10-CM

## 2025-07-03 DIAGNOSIS — T81.718A PSEUDOANEURYSM FOLLOWING PROCEDURE: Primary | ICD-10-CM

## 2025-07-03 DIAGNOSIS — K92.2 UPPER GI BLEED: ICD-10-CM

## 2025-07-03 LAB
ALBUMIN SERPL-MCNC: 4.8 G/DL (ref 3.5–5.2)
ALBUMIN/GLOB SERPL: 2 G/DL
ALP SERPL-CCNC: 92 U/L (ref 39–117)
ALT SERPL W P-5'-P-CCNC: 21 U/L (ref 1–41)
ANION GAP SERPL CALCULATED.3IONS-SCNC: 11.5 MMOL/L (ref 5–15)
AST SERPL-CCNC: 29 U/L (ref 1–40)
BACTERIA UR QL AUTO: NORMAL /HPF
BASOPHILS # BLD AUTO: 0.07 10*3/MM3 (ref 0–0.2)
BASOPHILS NFR BLD AUTO: 1.2 % (ref 0–1.5)
BH CV RIGHT GROIN PSA PROCEDURE SCRIPTING LRR: 1
BH CV VAS RIGHT PSA RADIAL SYS: 96.9 CM/SEC
BILIRUB SERPL-MCNC: 0.5 MG/DL (ref 0–1.2)
BILIRUB UR QL STRIP: NEGATIVE
BUN SERPL-MCNC: 16.4 MG/DL (ref 8–23)
BUN/CREAT SERPL: 12.2 (ref 7–25)
CALCIUM SPEC-SCNC: 9.5 MG/DL (ref 8.6–10.5)
CHLORIDE SERPL-SCNC: 102 MMOL/L (ref 98–107)
CLARITY UR: CLEAR
CO2 SERPL-SCNC: 25.5 MMOL/L (ref 22–29)
COLOR UR: YELLOW
CREAT SERPL-MCNC: 1.34 MG/DL (ref 0.76–1.27)
DEPRECATED RDW RBC AUTO: 44 FL (ref 37–54)
EGFRCR SERPLBLD CKD-EPI 2021: 60.3 ML/MIN/1.73
EOSINOPHIL # BLD AUTO: 0.25 10*3/MM3 (ref 0–0.4)
EOSINOPHIL NFR BLD AUTO: 4.2 % (ref 0.3–6.2)
ERYTHROCYTE [DISTWIDTH] IN BLOOD BY AUTOMATED COUNT: 12.8 % (ref 12.3–15.4)
GLOBULIN UR ELPH-MCNC: 2.4 GM/DL
GLUCOSE SERPL-MCNC: 112 MG/DL (ref 65–99)
GLUCOSE UR STRIP-MCNC: NEGATIVE MG/DL
HCT VFR BLD AUTO: 32.9 % (ref 37.5–51)
HGB BLD-MCNC: 10.7 G/DL (ref 13–17.7)
HGB UR QL STRIP.AUTO: NEGATIVE
HOLD SPECIMEN: NORMAL
HYALINE CASTS UR QL AUTO: NORMAL /LPF
IMM GRANULOCYTES # BLD AUTO: 0.01 10*3/MM3 (ref 0–0.05)
IMM GRANULOCYTES NFR BLD AUTO: 0.2 % (ref 0–0.5)
KETONES UR QL STRIP: ABNORMAL
LEUKOCYTE ESTERASE UR QL STRIP.AUTO: ABNORMAL
LYMPHOCYTES # BLD AUTO: 1.7 10*3/MM3 (ref 0.7–3.1)
LYMPHOCYTES NFR BLD AUTO: 28.5 % (ref 19.6–45.3)
MCH RBC QN AUTO: 30.5 PG (ref 26.6–33)
MCHC RBC AUTO-ENTMCNC: 32.5 G/DL (ref 31.5–35.7)
MCV RBC AUTO: 93.7 FL (ref 79–97)
MONOCYTES # BLD AUTO: 0.44 10*3/MM3 (ref 0.1–0.9)
MONOCYTES NFR BLD AUTO: 7.4 % (ref 5–12)
NEUTROPHILS NFR BLD AUTO: 3.5 10*3/MM3 (ref 1.7–7)
NEUTROPHILS NFR BLD AUTO: 58.5 % (ref 42.7–76)
NITRITE UR QL STRIP: NEGATIVE
NRBC BLD AUTO-RTO: 0 /100 WBC (ref 0–0.2)
PH UR STRIP.AUTO: 6 [PH] (ref 5–8)
PLATELET # BLD AUTO: 282 10*3/MM3 (ref 140–450)
PMV BLD AUTO: 9 FL (ref 6–12)
POTASSIUM SERPL-SCNC: 4 MMOL/L (ref 3.5–5.2)
PROT SERPL-MCNC: 7.2 G/DL (ref 6–8.5)
PROT UR QL STRIP: NEGATIVE
RBC # BLD AUTO: 3.51 10*6/MM3 (ref 4.14–5.8)
RBC # UR STRIP: NORMAL /HPF
REF LAB TEST METHOD: NORMAL
SODIUM SERPL-SCNC: 139 MMOL/L (ref 136–145)
SP GR UR STRIP: 1.02 (ref 1–1.03)
SQUAMOUS #/AREA URNS HPF: NORMAL /HPF
UROBILINOGEN UR QL STRIP: ABNORMAL
WBC # UR STRIP: NORMAL /HPF
WBC NRBC COR # BLD AUTO: 5.97 10*3/MM3 (ref 3.4–10.8)
WHOLE BLOOD HOLD COAG: NORMAL

## 2025-07-03 PROCEDURE — 74177 CT ABD & PELVIS W/CONTRAST: CPT

## 2025-07-03 PROCEDURE — 93931 UPPER EXTREMITY STUDY: CPT

## 2025-07-03 PROCEDURE — 85025 COMPLETE CBC W/AUTO DIFF WBC: CPT

## 2025-07-03 PROCEDURE — 25810000003 SODIUM CHLORIDE 0.9 % SOLUTION

## 2025-07-03 PROCEDURE — G0378 HOSPITAL OBSERVATION PER HR: HCPCS

## 2025-07-03 PROCEDURE — 93926 LOWER EXTREMITY STUDY: CPT

## 2025-07-03 PROCEDURE — 25510000001 IOPAMIDOL PER 1 ML

## 2025-07-03 PROCEDURE — 81001 URINALYSIS AUTO W/SCOPE: CPT

## 2025-07-03 PROCEDURE — 96375 TX/PRO/DX INJ NEW DRUG ADDON: CPT

## 2025-07-03 PROCEDURE — 99285 EMERGENCY DEPT VISIT HI MDM: CPT

## 2025-07-03 PROCEDURE — 80053 COMPREHEN METABOLIC PANEL: CPT

## 2025-07-03 RX ORDER — OMEPRAZOLE 40 MG/1
40 CAPSULE, DELAYED RELEASE ORAL DAILY
COMMUNITY

## 2025-07-03 RX ORDER — DESVENLAFAXINE 50 MG/1
1 TABLET, FILM COATED, EXTENDED RELEASE ORAL DAILY
COMMUNITY
Start: 2025-06-19

## 2025-07-03 RX ORDER — SODIUM CHLORIDE 9 MG/ML
40 INJECTION, SOLUTION INTRAVENOUS AS NEEDED
Status: DISCONTINUED | OUTPATIENT
Start: 2025-07-03 | End: 2025-07-07 | Stop reason: HOSPADM

## 2025-07-03 RX ORDER — ACETAMINOPHEN 325 MG/1
650 TABLET ORAL EVERY 6 HOURS PRN
Status: DISCONTINUED | OUTPATIENT
Start: 2025-07-03 | End: 2025-07-03 | Stop reason: SDUPTHER

## 2025-07-03 RX ORDER — ACETAMINOPHEN 325 MG/1
650 TABLET ORAL EVERY 4 HOURS PRN
Status: DISCONTINUED | OUTPATIENT
Start: 2025-07-03 | End: 2025-07-07 | Stop reason: HOSPADM

## 2025-07-03 RX ORDER — LORAZEPAM 0.5 MG/1
TABLET ORAL
COMMUNITY
Start: 2025-06-25

## 2025-07-03 RX ORDER — NITROGLYCERIN 0.4 MG/1
0.4 TABLET SUBLINGUAL
Status: DISCONTINUED | OUTPATIENT
Start: 2025-07-03 | End: 2025-07-07 | Stop reason: HOSPADM

## 2025-07-03 RX ORDER — ASPIRIN 81 MG/1
81 TABLET ORAL DAILY
Status: DISCONTINUED | OUTPATIENT
Start: 2025-07-03 | End: 2025-07-07 | Stop reason: HOSPADM

## 2025-07-03 RX ORDER — LORAZEPAM 0.5 MG/1
0.5 TABLET ORAL EVERY 6 HOURS PRN
Status: DISCONTINUED | OUTPATIENT
Start: 2025-07-03 | End: 2025-07-07 | Stop reason: HOSPADM

## 2025-07-03 RX ORDER — PANTOPRAZOLE SODIUM 40 MG/10ML
80 INJECTION, POWDER, LYOPHILIZED, FOR SOLUTION INTRAVENOUS ONCE
Status: COMPLETED | OUTPATIENT
Start: 2025-07-03 | End: 2025-07-03

## 2025-07-03 RX ORDER — SODIUM CHLORIDE 9 MG/ML
50 INJECTION, SOLUTION INTRAVENOUS CONTINUOUS
Status: DISPENSED | OUTPATIENT
Start: 2025-07-03 | End: 2025-07-04

## 2025-07-03 RX ORDER — SODIUM CHLORIDE 0.9 % (FLUSH) 0.9 %
10 SYRINGE (ML) INJECTION EVERY 12 HOURS SCHEDULED
Status: DISCONTINUED | OUTPATIENT
Start: 2025-07-03 | End: 2025-07-07 | Stop reason: HOSPADM

## 2025-07-03 RX ORDER — DESVENLAFAXINE 50 MG/1
50 TABLET, FILM COATED, EXTENDED RELEASE ORAL DAILY
Status: DISCONTINUED | OUTPATIENT
Start: 2025-07-04 | End: 2025-07-07 | Stop reason: HOSPADM

## 2025-07-03 RX ORDER — PANTOPRAZOLE SODIUM 40 MG/10ML
40 INJECTION, POWDER, LYOPHILIZED, FOR SOLUTION INTRAVENOUS
Status: DISCONTINUED | OUTPATIENT
Start: 2025-07-04 | End: 2025-07-07 | Stop reason: HOSPADM

## 2025-07-03 RX ORDER — SODIUM CHLORIDE 0.9 % (FLUSH) 0.9 %
10 SYRINGE (ML) INJECTION AS NEEDED
Status: DISCONTINUED | OUTPATIENT
Start: 2025-07-03 | End: 2025-07-07 | Stop reason: HOSPADM

## 2025-07-03 RX ORDER — IOPAMIDOL 755 MG/ML
100 INJECTION, SOLUTION INTRAVASCULAR
Status: COMPLETED | OUTPATIENT
Start: 2025-07-03 | End: 2025-07-03

## 2025-07-03 RX ORDER — ATORVASTATIN CALCIUM 40 MG/1
80 TABLET, FILM COATED ORAL NIGHTLY
Status: DISCONTINUED | OUTPATIENT
Start: 2025-07-03 | End: 2025-07-07 | Stop reason: HOSPADM

## 2025-07-03 RX ORDER — ACETAMINOPHEN 650 MG/1
650 SUPPOSITORY RECTAL EVERY 4 HOURS PRN
Status: DISCONTINUED | OUTPATIENT
Start: 2025-07-03 | End: 2025-07-07 | Stop reason: HOSPADM

## 2025-07-03 RX ORDER — ONDANSETRON 2 MG/ML
4 INJECTION INTRAMUSCULAR; INTRAVENOUS EVERY 6 HOURS PRN
Status: DISCONTINUED | OUTPATIENT
Start: 2025-07-03 | End: 2025-07-07 | Stop reason: HOSPADM

## 2025-07-03 RX ORDER — ACETAMINOPHEN 160 MG/5ML
650 SOLUTION ORAL EVERY 4 HOURS PRN
Status: DISCONTINUED | OUTPATIENT
Start: 2025-07-03 | End: 2025-07-07 | Stop reason: HOSPADM

## 2025-07-03 RX ADMIN — SODIUM CHLORIDE 1000 ML: 9 INJECTION, SOLUTION INTRAVENOUS at 18:04

## 2025-07-03 RX ADMIN — IOPAMIDOL 100 ML: 755 INJECTION, SOLUTION INTRAVENOUS at 18:04

## 2025-07-03 RX ADMIN — PANTOPRAZOLE SODIUM 80 MG: 40 INJECTION, POWDER, FOR SOLUTION INTRAVENOUS at 17:08

## 2025-07-03 RX ADMIN — SODIUM CHLORIDE 50 ML/HR: 9 INJECTION, SOLUTION INTRAVENOUS at 23:31

## 2025-07-03 RX ADMIN — LORAZEPAM 0.5 MG: 0.5 TABLET ORAL at 23:55

## 2025-07-03 NOTE — ED PROVIDER NOTES
Subjective   History of Present Illness  Patient is a 60-year-old male with PMH of GERD presenting to the ED for abdominal pain and dark and tarry stools for the past few days.  Patient states for the past week he has been having intermittent epigastric abdominal pain described as a stinging pain along with tenderness to palpation around his umbilicus.  He rates his pain a 2 out of 10 at its worst.  Patient states for the past week he has been constipated, had been taking laxatives, was able to have small bowel movements several days ago, since then has been noticing dark tarriness to his stools.  He reports slight dysuria and intermittent lightheadedness.  He was started on blood thinners about a month ago.  He denies any fever, chills, nausea, vomiting, diarrhea, hematuria.        Review of Systems   Constitutional:  Negative for chills and fever.   Gastrointestinal:  Positive for abdominal pain, blood in stool and constipation. Negative for diarrhea, nausea and vomiting.   Genitourinary:  Positive for dysuria. Negative for hematuria.   Neurological:  Positive for light-headedness.       Past Medical History:   Diagnosis Date    Laryngopharyngeal reflux (LPR)        No Known Allergies    Past Surgical History:   Procedure Laterality Date    CARDIAC CATHETERIZATION N/A 5/16/2025    Procedure: Left Heart Cath radial;  Surgeon: Richard Moore DO;  Location: [facility] CATH INVASIVE LOCATION;  Service: Cardiovascular;  Laterality: N/A;    COLONOSCOPY      COLONOSCOPY N/A 1/29/2024    Procedure: COLONOSCOPY WITH POLYPECTOMY X1;  Surgeon: Tre Fitzpatrick MD;  Location: [facility] ENDOSCOPY;  Service: Gastroenterology;  Laterality: N/A;  POST: POLYP    ENDOSCOPY N/A 1/29/2024    Procedure: ESOPHAGOGASTRODUODENOSCOPY WITH BIOPSY X1 AREA AND DILATION UP TO 50;  Surgeon: Tre Fitzpatrick MD;  Location: [facility] ENDOSCOPY;  Service: Gastroenterology;  Laterality: N/A;  POST: GASTRITIS, ESOPHAGEAL STRICTURE        Family History   Problem Relation Age of Onset    Cancer Father        Social History     Socioeconomic History    Marital status: Single   Tobacco Use    Smoking status: Never     Passive exposure: Never    Smokeless tobacco: Never   Vaping Use    Vaping status: Never Used   Substance and Sexual Activity    Alcohol use: Yes     Comment: Occasional    Drug use: Never    Sexual activity: Defer           Objective   Physical Exam  Exam conducted with a chaperone present.   Constitutional:       Appearance: Normal appearance. He is well-developed.   HENT:      Head: Normocephalic and atraumatic.      Mouth/Throat:      Mouth: Mucous membranes are moist.   Eyes:      Extraocular Movements: Extraocular movements intact.   Cardiovascular:      Rate and Rhythm: Normal rate and regular rhythm.      Pulses: Normal pulses.      Heart sounds: Normal heart sounds.   Pulmonary:      Effort: Pulmonary effort is normal.      Breath sounds: Normal breath sounds.   Abdominal:      General: Abdomen is flat. Bowel sounds are normal.      Palpations: Abdomen is soft.      Tenderness: There is abdominal tenderness in the epigastric area and periumbilical area.   Genitourinary:     Rectum: Normal. Guaiac result positive. No external hemorrhoid or internal hemorrhoid.   Musculoskeletal:         General: Normal range of motion.      Cervical back: Normal range of motion.      Right lower leg: No edema.      Left lower leg: No edema.   Skin:     General: Skin is warm and dry.      Capillary Refill: Capillary refill takes less than 2 seconds.   Neurological:      General: No focal deficit present.      Mental Status: He is alert and oriented to person, place, and time.   Psychiatric:         Mood and Affect: Mood normal.         Behavior: Behavior normal.         Procedures           ED Course  ED Course as of 07/03/25 1901   u Jul 03, 2025 1823 Waiting for CT [EC]   0335 Spoke with Dr. Cueva hospitalist who agreed to admit  "patient. [EC]      ED Course User Index  [EC] Tonya Emelina DEJON VENEGAS      BP (P) 138/80   Pulse (P) 67   Temp 98.8 °F (37.1 °C) (Oral)   Resp 16   Ht 182.9 cm (72\")   Wt 76.8 kg (169 lb 5 oz)   SpO2 (P) 100%   BMI 22.96 kg/m²   Labs Reviewed   COMPREHENSIVE METABOLIC PANEL - Abnormal; Notable for the following components:       Result Value    Glucose 112 (*)     Creatinine 1.34 (*)     All other components within normal limits    Narrative:     GFR Categories in Chronic Kidney Disease (CKD)              GFR Category          GFR (mL/min/1.73)    Interpretation  G1                    90 or greater        Normal or high (1)  G2                    60-89                Mild decrease (1)  G3a                   45-59                Mild to moderate decrease  G3b                   30-44                Moderate to severe decrease  G4                    15-29                Severe decrease  G5                    14 or less           Kidney failure    (1)In the absence of evidence of kidney disease, neither GFR category G1 or G2 fulfill the criteria for CKD.    eGFR calculation 2021 CKD-EPI creatinine equation, which does not include race as a factor   URINALYSIS W/ CULTURE IF INDICATED - Abnormal; Notable for the following components:    Ketones, UA Trace (*)     Leuk Esterase, UA Trace (*)     All other components within normal limits    Narrative:     In absence of clinical symptoms, the presence of pyuria, bacteria, and/or nitrites on the urinalysis result does not correlate with infection.   CBC WITH AUTO DIFFERENTIAL - Abnormal; Notable for the following components:    RBC 3.51 (*)     Hemoglobin 10.7 (*)     Hematocrit 32.9 (*)     All other components within normal limits   URINALYSIS, MICROSCOPIC ONLY   CBC AND DIFFERENTIAL    Narrative:     The following orders were created for panel order CBC & Differential.  Procedure                               Abnormality         Status                     ---------     "                           -----------         ------                     CBC Auto Differential[[id number]]        Abnormal            Final result                 Please view results for these tests on the individual orders.   EXTRA TUBES    Narrative:     The following orders were created for panel order Extra Tubes.  Procedure                               Abnormality         Status                     ---------                               -----------         ------                     Gold Top - SST[[id number]]                                   Final result               Light Blue Top[[id number]]                                   Final result                 Please view results for these tests on the individual orders.   GOLD TOP - SST   LIGHT BLUE TOP     Medications   pantoprazole (PROTONIX) injection 80 mg (80 mg Intravenous Given 7/3/25 1708)   sodium chloride 0.9 % bolus 1,000 mL (1,000 mL Intravenous New Bag 7/3/25 1804)   iopamidol (ISOVUE-370) 76 % injection 100 mL (100 mL Intravenous Given 7/3/25 1804)     CT Abdomen Pelvis With Contrast  Result Date: 7/3/2025  No acute process demonstrated Electronically Signed: Jayden Zelaya  7/3/2025 6:35 PM EDT  Workstation ID: [id number]                                                     Medical Decision Making  Chart review: 7/3/25 Dr. Mcdonald Vascular Surgery: Flip Vang is a 60 year old male who returns for follow up of a right radial pseudoaneurysm s/p [facility], treated with compression with a TR band.  Duplex today showed a thrombosed pseudoaneurysm, with normal waveforms throughout the radial artery.  He has no restrictions from my standpoint and will follow up as needed.  Of note, he reports occasional abdominal pain, constipation, and dark stools that started after he started taking the xarelto.  He does not take iron.  He has not thrown up blood.  He does occasionally feel dizzy and lightheaded, but he denies dizziness and lightheadedness with standing or near  syncope.  I am concerned about a possible GI bleed, which I counseled him about, especially since he is on therapeutic anticoagulation.  His vital signs were normal and he does not have any symptoms of anemia so I instructed him to call his GI doctor for further instruction and to report to the ED should he develop any dizziness, lightheadedness, or near syncope.      Patient presented to the ED for the above complaint.    Patient underwent the above exam and evaluation.    While in the ED patient was placed in gown and IV was established.  CT abdomen pelvis and blood work was obtained to assess for intra-abdominal infection, perforation, abscess, electro abnormality, dehydration, anemia.  Patient was given IV Protonix and fluids.  Upon reevaluation patient resting comfortably, vital stable.  Discussed findings with patient and family at bedside.  Educated patient that we will be admitting him for further evaluation and treatment.  Patient family voiced understanding, agreeable with dispo plan.  Spoke to Dr. Cueva with [facility] who agreed to admit patient.    Labs were independently interpreted by myself and deemed remarkable for the following: No leukocytosis, hemoglobin dropping at 10.7, slightly elevated creatinine at 1.34, no electrolyte abnormalities, urinalysis negative for hematuria bacteria or pyuria.  CT abdomen pelvis independently interpreted by radiologist and reviewed by myself showing: No acute intra-abdominal infection, abscess, perforation, or obstruction.    Appropriate PPE was worn during each patient encounter.    Note Disclaimer: At [facility], we believe that sharing information builds trust and better relationships. You are receiving this note because you are receiving care at [facility] or recently visited. It is possible you will see health information before a provider has talked with you about it. This kind of information can be easy to misunderstand. To help you fully understand what  it means for your health, we urge you to discuss this note with your provider.    Based on clinical findings anticipate this patient will require a 2 midnight stay.    Discussed this patient with Dr. Churchill who agrees with plan.      Problems Addressed:  Generalized abdominal pain: complicated acute illness or injury  Melena: complicated acute illness or injury  Upper GI bleed: complicated acute illness or injury    Amount and/or Complexity of Data Reviewed  Labs: ordered.  Radiology: ordered.    Risk  Prescription drug management.  Decision regarding hospitalization.        Final diagnoses:   Generalized abdominal pain   Upper GI bleed   Melena       ED Disposition  ED Disposition       ED Disposition   Decision to Admit    Condition   --    Comment   Level of Care: Telemetry [5]   Admitting Physician: TRACIE BREWER [[id number]]   Attending Physician: TRACIE BREWER [[id number]]                 No follow-up provider specified.       Medication List      No changes were made to your prescriptions during this visit.            Emelina Castaneda PA-C  07/03/25 [id number]

## 2025-07-03 NOTE — PROGRESS NOTES
Chief Complaint  Pseudoaneurysm    Subjective        Flip Vang presents to [facility] VASCULAR SURGERY  History of Present Illness  60 y.o. male returns for 1 month follow up for a right radial pseudoaneurysm s/p [facility].  This was treated with compression.  His bruising, swelling, and pain  have resolved and he is back to his normal level of activity with no restriction.  Of note, he reports having dark stools recently.  No hematemesis.  He is taking xarelto and aspirin after a stroke and abnormal hypercoagulable work up.  He has contacted his GI doctor and PCP but have not heard back from them regarding this issue.        Past Medical History:   Diagnosis Date    Laryngopharyngeal reflux (LPR)        Past Surgical History:   Procedure Laterality Date    CARDIAC CATHETERIZATION N/A 5/16/2025    Procedure: Left Heart Cath radial;  Surgeon: Richard Moore DO;  Location: [facility] CATH INVASIVE LOCATION;  Service: Cardiovascular;  Laterality: N/A;    COLONOSCOPY      COLONOSCOPY N/A 1/29/2024    Procedure: COLONOSCOPY WITH POLYPECTOMY X1;  Surgeon: Tre Fitzpatrick MD;  Location: [facility] ENDOSCOPY;  Service: Gastroenterology;  Laterality: N/A;  POST: POLYP    ENDOSCOPY N/A 1/29/2024    Procedure: ESOPHAGOGASTRODUODENOSCOPY WITH BIOPSY X1 AREA AND DILATION UP TO 50;  Surgeon: Tre Fitzpatrick MD;  Location: [facility] ENDOSCOPY;  Service: Gastroenterology;  Laterality: N/A;  POST: GASTRITIS, ESOPHAGEAL STRICTURE       Family History   Problem Relation Age of Onset    Cancer Father          Social History     Tobacco Use    Smoking status: Never     Passive exposure: Never    Smokeless tobacco: Never   Vaping Use    Vaping status: Never Used   Substance Use Topics    Alcohol use: Yes     Comment: Occasional    Drug use: Never       Allergies: Patient has no known allergies.    Prior to Admission medications    Medication Sig Start Date End Date Taking? Authorizing Provider  "  acetaminophen (TYLENOL) 325 MG tablet Take 2 tablets by mouth Every 6 (Six) Hours As Needed for Mild Pain.   Yes Lorenza Álvarez MD   aspirin 81 MG EC tablet Take 1 tablet by mouth Daily. 5/21/25  Yes Cierra Cueva MD   atorvastatin (LIPITOR) 80 MG tablet Take 1 tablet by mouth Every Night. 5/20/25  Yes Cierra Cueva MD   desvenlafaxine (PRISTIQ) 50 MG 24 hr tablet Take 1 tablet by mouth Daily. 6/19/25  Yes Lorenza Álvarez MD   LORazepam (ATIVAN) 0.5 MG tablet TAKE 1 OR 2 TABLETS BY MOUTH 2 TIMES A DAY AS NEEDED 6/25/25  Yes Lorenza Álvarez MD   omeprazole (priLOSEC) 20 MG capsule Take 1 capsule by mouth Daily. 12/24/23  Yes Mariella Mendoza APRN   rivaroxaban (XARELTO) 20 MG tablet Take 1 tablet by mouth Daily. 6/19/25  Yes Gonzalo Kearney MD         Objective   Vital Signs:  /67 (BP Location: Left arm)   Ht 195.6 cm (77.01\")   Wt 78 kg (172 lb)   BMI 20.39 kg/m²   Estimated body mass index is 20.39 kg/m² as calculated from the following:    Height as of this encounter: 195.6 cm (77.01\").    Weight as of this encounter: 78 kg (172 lb).       BMI is within normal parameters. No other follow-up for BMI required.       Physical Exam  Vitals reviewed.   Constitutional:       General: He is not in acute distress.     Appearance: Normal appearance.   HENT:      Head: Normocephalic and atraumatic.      Right Ear: External ear normal.      Left Ear: External ear normal.      Nose: Nose normal.      Mouth/Throat:      Mouth: Mucous membranes are moist.      Pharynx: Oropharynx is clear.   Eyes:      Extraocular Movements: Extraocular movements intact.      Conjunctiva/sclera: Conjunctivae normal.      Pupils: Pupils are equal, round, and reactive to light.   Cardiovascular:      Rate and Rhythm: Normal rate and regular rhythm.      Pulses:           Carotid pulses are 2+ on the right side and 2+ on the left side.       Radial pulses are 2+ on the right side and 2+ on the left side.      " Comments: No pulsatile mass at right wrist, palpable right brachial and radial pulses  Pulmonary:      Comments: Non labored respirations on room air, equal chest rise  Abdominal:      General: Abdomen is flat. There is no distension.      Palpations: Abdomen is soft.   Musculoskeletal:      Cervical back: Normal range of motion. No rigidity.      Right lower leg: No edema.      Left lower leg: No edema.   Skin:     General: Skin is warm and dry.      Capillary Refill: Capillary refill takes less than 2 seconds.   Neurological:      General: No focal deficit present.      Mental Status: He is alert and oriented to person, place, and time.   Psychiatric:         Mood and Affect: Mood normal.         Behavior: Behavior normal.        Result Review :    The following data was reviewed by: Luisa Mcdonald MD on 07/03/2025:  Common labs          5/19/2025    12:23 5/19/2025    22:16 5/24/2025    17:48 6/19/2025    14:06   Common Labs   Glucose 124  107  101     BUN 12  13  16     Creatinine 1.21  1.26  1.15     Sodium 135  137  137     Potassium 3.8  3.9  3.9     Chloride 98  102  102     Calcium 9.5  9.0  9.0     Albumin 4.8       Total Bilirubin 0.5       Alkaline Phosphatase 90       AST (SGOT) 22       ALT (SGPT) 18       WBC 5.86  6.56  8.74  7.73    Hemoglobin 14.5  13.6  12.7  12.5    Hematocrit 43.8  40.9  37.9  38.3    Platelets 250  258  247  249    Hemoglobin A1C 5.37         CMP          5/16/2025    04:26 5/19/2025    12:23 5/19/2025    22:16 5/24/2025    17:48   CMP   Glucose 119  124  107  101    BUN 13  12  13  16    Creatinine 1.12  1.21  1.26  1.15    EGFR 75.2  68.5  65.3  72.9    Sodium 139  135  137  137    Potassium 4.1  3.8  3.9  3.9    Chloride 106  98  102  102    Calcium 9.2  9.5  9.0  9.0    Total Protein  7.5      Albumin  4.8      Globulin  2.7      Total Bilirubin  0.5      Alkaline Phosphatase  90      AST (SGOT)  22      ALT (SGPT)  18      Albumin/Globulin Ratio  1.8       BUN/Creatinine Ratio 11.6  9.9  10.3  13.9    Anion Gap 8.8  10.0  10.3  12.6      CBC          5/19/2025    12:23 5/19/2025    22:16 5/24/2025    17:48 6/19/2025    14:06   CBC   WBC 5.86  6.56  8.74  7.73    RBC 4.77  4.50  4.19  4.01    Hemoglobin 14.5  13.6  12.7  12.5    Hematocrit 43.8  40.9  37.9  38.3    MCV 91.8  90.9  90.5  95.5    MCH 30.4  30.2  30.3  31.2    MCHC 33.1  33.3  33.5  32.6    RDW 12.6  12.7  12.5  13.3    Platelets 250  258  247  249      Renal Profile          5/16/2025    04:26 5/19/2025    12:23 5/19/2025    22:16 5/24/2025    17:48   Renal Profile   BUN 13  12  13  16    Creatinine 1.12  1.21  1.26  1.15      BMP          5/16/2025    04:26 5/19/2025    12:23 5/19/2025    22:16 5/24/2025    17:48   BMP   BUN 13  12  13  16    Creatinine 1.12  1.21  1.26  1.15    Sodium 139  135  137  137    Potassium 4.1  3.8  3.9  3.9    Chloride 106  98  102  102    CO2 24.2  27.0  24.7  22.4    Calcium 9.2  9.5  9.0  9.0          Last Echo  Results for orders placed during the hospital encounter of 05/19/25    Adult Transesophageal Echo (DARREN) W/ Cont if Necessary Per Protocol (Cardiology Department) 05/30/2025 12:53 PM    Interpretation Summary    Left ventricular systolic function is normal. Left ventricular ejection fraction appears to be 56 - 60%.    Left ventricular diastolic function was normal.    Saline test results are negative.      Results for orders placed during the hospital encounter of 05/14/25    Adult Transthoracic Echo Complete w/ Color, Spectral and Contrast if necessary per protocol 05/15/2025 12:15 PM    Interpretation Summary    Left ventricular systolic function is normal. Calculated left ventricular EF = 55.3%    Left ventricular diastolic function was normal.    Estimated right ventricular systolic pressure from tricuspid regurgitation is normal (<35 mmHg).       Last Stress  Results for orders placed during the hospital encounter of 05/14/25    Stress Test With Myocardial  Perfusion One Day 05/15/2025 10:39 AM    Interpretation Summary    Myocardial perfusion imaging indicates a normal myocardial perfusion study with no evidence of ischemia. Impressions are consistent with a low risk study.    Left ventricular ejection fraction is normal (Calculated EF = 63%).    Low risk for ischemic heart disease.    This is normal Cardiolite imaging stress test with no evidence of ischemia or myocardial infarction.  Left ventricle size and function is normal on gated SPECT imaging.  No wall motion abnormality was noted.  Clinical correlation recommended.  Further recommendation as per ordering physician. .    Findings consistent with a normal ECG stress test.       Last Cath  Results for orders placed during the hospital encounter of 25    Cardiac Catheterization/Vascular Study    Conclusion  Table formatting from the original result was not included.  Cardiac Catheterization Operative Report  PATIENT IDENTIFICATION  Name: Flip Vang  Age: 59 y.o. Sex: male : 1965  MRN: [id number]    Date: 2025  PCP: @PCP@    Requesting Provider  [unfilled]    He underwent cardiac catheterization.    Indications for the procedure include: chest pain.    Procedure Details:  The risks, benefits, complications, treatment options, and expected outcomes were discussed with the patient. The patient and/or family concurred with the proposed plan, giving informed consent.    After informed consent the patient was brought to the cath lab after appropriate IV hydration was begun and oral premedication was given. He was further sedated with fentanyl and midazolam. He was prepped and draped in the usual manner. Using the modified Seldinger access technique, a 6 Sinhala sheath was placed in the radial artery. A left heart catheterization with coronary arteriography was performed. Findings are discussed below.    After the procedure was completed, sedation was stopped and the sheaths and catheters were all  removed. Hemostasis was achieved per established hospital protocols.    Conscious sedation:  Conscious sedation was performed according to protocol.  I supervised and directed an independent trained observer with the assistance of monitoring the patient's level of consciousness and physiologic status throughout the procedure.  Intraoperative service time was 39 minutes.    Findings:    Hemodynamics LVEDP: 12  LV: 127/5  AO: 133/80  Left Main No significant disease  RCA No significant disease  LAD No significant disease  Circ No significant disease  SVG(s) Not applicable  ALMAS Not applicable  LV Normal left ventricular systolic function ejection fraction 60-65%  Coronary Dominance RCA    Estimated Blood Loss:  Minimal    Specimens: None    Complications:  None; patient tolerated the procedure well.    Disposition: PACU - hemodynamically stable    Condition: stable    Impressions:  No angiographic evidence for significant epicardial occlusive disease  Normal left ventricular systolic function ejection fraction 60 to 65%    Recommendations:  Medical therapy and risk factor modification                Assessment and Plan     Diagnoses and all orders for this visit:    1. Pseudoaneurysm following procedure (Primary)    2. Dark stools    Flip Vang is a 60 year old male who returns for follow up of a right radial pseudoaneurysm s/p [facility], treated with compression with a TR band.  Duplex today showed a thrombosed pseudoaneurysm, with normal waveforms throughout the radial artery.  He has no restrictions from my standpoint and will follow up as needed.    Of note, he reports occasional abdominal pain, constipation, and dark stools that started after he started taking the xarelto.  He does not take iron.  He has not thrown up blood.  He does occasionally feel dizzy and lightheaded, but he denies dizziness and lightheadedness with standing or near syncope.  I am concerned about a possible GI bleed, which I counseled him about,  especially since he is on therapeutic anticoagulation.  His vital signs were normal and he does not have any symptoms of anemia so I instructed him to call his GI doctor for further instruction and to report to the ED should he develop any dizziness, lightheadedness, or near syncope.           Follow Up     Return if symptoms worsen or fail to improve.  Patient was given instructions and counseling regarding his condition or for health maintenance advice. Please see specific information pulled into the AVS if appropriate.   Any information in this note that has been copied and pasted was reviewed and edited to reflect today's exam.  This note was created using Dragon dictation software.  I have reviewed the note for accuracy and made corrections as needed.  Please note that some errors may still exist, please contact me with any questions or concerns.

## 2025-07-03 NOTE — LETTER
July 6, 2025     Patient: Flip Vang   YOB: 1965   Date of Visit: 7/3/2025       To Whom it May Concern:    Flip Vang was seen in my clinic on 7/3/2025. He {Return to school/sport:31056}.    If you have any questions or concerns, please don't hesitate to call.         Sincerely,          No name on file        CC: No Recipients

## 2025-07-03 NOTE — Clinical Note
Level of Care: Telemetry [5]   Admitting Physician: TRACIE BREWER [[id number]]   Attending Physician: TRACIE BREWER [[id number]]

## 2025-07-04 ENCOUNTER — INPATIENT HOSPITAL (AMBULATORY)
Dept: URBAN - METROPOLITAN AREA HOSPITAL 84 | Facility: HOSPITAL | Age: 60
End: 2025-07-04
Payer: COMMERCIAL

## 2025-07-04 DIAGNOSIS — K92.1 MELENA: ICD-10-CM

## 2025-07-04 DIAGNOSIS — D64.9 ANEMIA, UNSPECIFIED: ICD-10-CM

## 2025-07-04 PROBLEM — F41.9 ANXIETY: Status: ACTIVE | Noted: 2025-07-04

## 2025-07-04 LAB
ANION GAP SERPL CALCULATED.3IONS-SCNC: 10.1 MMOL/L (ref 5–15)
BUN SERPL-MCNC: 13.8 MG/DL (ref 8–23)
BUN/CREAT SERPL: 11.5 (ref 7–25)
CALCIUM SPEC-SCNC: 8.2 MG/DL (ref 8.6–10.5)
CHLORIDE SERPL-SCNC: 107 MMOL/L (ref 98–107)
CO2 SERPL-SCNC: 24.9 MMOL/L (ref 22–29)
CREAT SERPL-MCNC: 1.2 MG/DL (ref 0.76–1.27)
DEPRECATED RDW RBC AUTO: 44.3 FL (ref 37–54)
EGFRCR SERPLBLD CKD-EPI 2021: 68.8 ML/MIN/1.73
ERYTHROCYTE [DISTWIDTH] IN BLOOD BY AUTOMATED COUNT: 12.8 % (ref 12.3–15.4)
FERRITIN SERPL-MCNC: 16.1 NG/ML (ref 30–400)
GLUCOSE BLDC GLUCOMTR-MCNC: 86 MG/DL (ref 70–105)
GLUCOSE SERPL-MCNC: 103 MG/DL (ref 65–99)
HCT VFR BLD AUTO: 30.4 % (ref 37.5–51)
HGB BLD-MCNC: 9.7 G/DL (ref 13–17.7)
IRON 24H UR-MRATE: 18 MCG/DL (ref 59–158)
IRON SATN MFR SERPL: 5 % (ref 20–50)
MCH RBC QN AUTO: 29.9 PG (ref 26.6–33)
MCHC RBC AUTO-ENTMCNC: 31.9 G/DL (ref 31.5–35.7)
MCV RBC AUTO: 93.8 FL (ref 79–97)
PLATELET # BLD AUTO: 239 10*3/MM3 (ref 140–450)
PMV BLD AUTO: 9.3 FL (ref 6–12)
POTASSIUM SERPL-SCNC: 4.1 MMOL/L (ref 3.5–5.2)
RBC # BLD AUTO: 3.24 10*6/MM3 (ref 4.14–5.8)
RETICS # AUTO: 0.08 10*6/MM3 (ref 0.02–0.13)
RETICS/RBC NFR AUTO: 2.35 % (ref 0.7–1.9)
SODIUM SERPL-SCNC: 142 MMOL/L (ref 136–145)
TIBC SERPL-MCNC: 373 MCG/DL (ref 298–536)
TRANSFERRIN SERPL-MCNC: 250 MG/DL (ref 200–360)
WBC NRBC COR # BLD AUTO: 4.48 10*3/MM3 (ref 3.4–10.8)

## 2025-07-04 PROCEDURE — 82728 ASSAY OF FERRITIN: CPT

## 2025-07-04 PROCEDURE — 83540 ASSAY OF IRON: CPT | Performed by: INTERNAL MEDICINE

## 2025-07-04 PROCEDURE — 82948 REAGENT STRIP/BLOOD GLUCOSE: CPT

## 2025-07-04 PROCEDURE — 85027 COMPLETE CBC AUTOMATED: CPT | Performed by: INTERNAL MEDICINE

## 2025-07-04 PROCEDURE — 99222 1ST HOSP IP/OBS MODERATE 55: CPT

## 2025-07-04 PROCEDURE — 25810000003 SODIUM CHLORIDE 0.9 % SOLUTION

## 2025-07-04 PROCEDURE — 85045 AUTOMATED RETICULOCYTE COUNT: CPT | Performed by: INTERNAL MEDICINE

## 2025-07-04 PROCEDURE — G0378 HOSPITAL OBSERVATION PER HR: HCPCS

## 2025-07-04 PROCEDURE — 96376 TX/PRO/DX INJ SAME DRUG ADON: CPT

## 2025-07-04 PROCEDURE — 80048 BASIC METABOLIC PNL TOTAL CA: CPT | Performed by: INTERNAL MEDICINE

## 2025-07-04 PROCEDURE — 96365 THER/PROPH/DIAG IV INF INIT: CPT

## 2025-07-04 PROCEDURE — 96361 HYDRATE IV INFUSION ADD-ON: CPT

## 2025-07-04 PROCEDURE — 84466 ASSAY OF TRANSFERRIN: CPT | Performed by: INTERNAL MEDICINE

## 2025-07-04 PROCEDURE — 25010000002 NA FERRIC GLUC CPLX PER 12.5 MG

## 2025-07-04 RX ADMIN — SODIUM CHLORIDE 50 ML/HR: 9 INJECTION, SOLUTION INTRAVENOUS at 20:37

## 2025-07-04 RX ADMIN — LORAZEPAM 0.5 MG: 0.5 TABLET ORAL at 20:42

## 2025-07-04 RX ADMIN — PANTOPRAZOLE SODIUM 40 MG: 40 INJECTION, POWDER, FOR SOLUTION INTRAVENOUS at 18:25

## 2025-07-04 RX ADMIN — SODIUM CHLORIDE 125 MG: 9 INJECTION, SOLUTION INTRAVENOUS at 12:48

## 2025-07-04 RX ADMIN — PANTOPRAZOLE SODIUM 40 MG: 40 INJECTION, POWDER, FOR SOLUTION INTRAVENOUS at 08:20

## 2025-07-04 RX ADMIN — DESVENLAFAXINE 50 MG: 50 TABLET, EXTENDED RELEASE ORAL at 08:20

## 2025-07-04 NOTE — H&P
Patient Care Team:  Aristeo Rodriguez MD as PCP - General (Family Medicine)  Aristeo Rodriguez MD as PCP - Family Medicine  Aristeo Rodriguez MD    Chief complaint abdominal pain, black stools    Subjective     Patient is a 60 y.o. 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  He reported these symptoms to Vascular MD during routine followup and she advised coming to the ER with concerns of GI on anticoagulation.  He notes abdominal tenderness with palpation to the right of umbilicus. He had some recent constipation, treated with laxative and noted since then, black stools.  No Peptobismol use.  Reports intermittent lightheadedness, but no orthostatic dizziness or syncope. Denies heart burn.  Has chronic epigastric discomfort.  No fever, chills, diarrhea. Left eye visual deficit remains unchanged from previous admission.    He expresses concern that he is followed by [facility] as [facility] is not in his provider list.      He is followed by Dr. Kearney for anticoagulation workup.  Lupus anticoagulant was reported as positive. Per patient, plan to repeat it in 6 months and was recommended to stay on anticoagulation until then.    In the ER, He was found to have a HGB of 10.7 which is a 2 point drop since June 19.  Creatinine 1.34 with BUN 16.  No leukocytosis, plt wNL. UA with t race ketones and leukocytes but no signs of infections.  CT abd/pelvis without acute process.      Review of Systems   Constitutional:  Negative for chills and fever.   Eyes:  Positive for visual disturbance.   Gastrointestinal:  Positive for abdominal pain, blood in stool and constipation. Negative for nausea.   Skin:  Negative for pallor.   Neurological:  Positive for light-headedness.          History  Past Medical History:   Diagnosis Date     Laryngopharyngeal reflux (LPR)      Past Surgical History:   Procedure Laterality Date    CARDIAC CATHETERIZATION N/A 5/16/2025    Procedure: Left Heart Cath radial;  Surgeon: Richard Moore DO;  Location: [facility] CATH INVASIVE LOCATION;  Service: Cardiovascular;  Laterality: N/A;    COLONOSCOPY      COLONOSCOPY N/A 1/29/2024    Procedure: COLONOSCOPY WITH POLYPECTOMY X1;  Surgeon: Tre Fitzpatrick MD;  Location: [facility] ENDOSCOPY;  Service: Gastroenterology;  Laterality: N/A;  POST: POLYP    ENDOSCOPY N/A 1/29/2024    Procedure: ESOPHAGOGASTRODUODENOSCOPY WITH BIOPSY X1 AREA AND DILATION UP TO 50;  Surgeon: Tre Fitzpatrick MD;  Location: [facility] ENDOSCOPY;  Service: Gastroenterology;  Laterality: N/A;  POST: GASTRITIS, ESOPHAGEAL STRICTURE     Family History   Problem Relation Age of Onset    Cancer Father      Social History     Tobacco Use    Smoking status: Never     Passive exposure: Never    Smokeless tobacco: Never   Vaping Use    Vaping status: Never Used   Substance Use Topics    Alcohol use: Yes     Comment: Occasional    Drug use: Never     Medications Prior to Admission   Medication Sig Dispense Refill Last Dose/Taking    acetaminophen (TYLENOL) 325 MG tablet Take 2 tablets by mouth Every 6 (Six) Hours As Needed for Mild Pain.   Taking As Needed    aspirin 81 MG EC tablet Take 1 tablet by mouth Daily. 30 tablet 5 7/3/2025 Morning    atorvastatin (LIPITOR) 80 MG tablet Take 1 tablet by mouth Every Night. 90 tablet 3 7/2/2025 Bedtime    desvenlafaxine (PRISTIQ) 50 MG 24 hr tablet Take 1 tablet by mouth Daily.   7/3/2025 Morning    LORazepam (ATIVAN) 0.5 MG tablet TAKE 1 OR 2 TABLETS BY MOUTH 2 TIMES A DAY AS NEEDED   Taking    omeprazole (priLOSEC) 40 MG capsule Take 1 capsule by mouth Daily.   7/3/2025 Morning    rivaroxaban (XARELTO) 20 MG tablet Take 1 tablet by mouth Every Night.   7/2/2025 Bedtime     Allergies:  Patient has no known allergies.    Objective     Vital  Signs  Temp:  [97.6 °F (36.4 °C)-98.8 °F (37.1 °C)] 97.6 °F (36.4 °C)  Heart Rate:  [52-89] 76  Resp:  [13-16] 13  BP: (121-167)/(67-80) 122/70     Physical Exam:      General Appearance:    Alert, cooperative, in no acute distress   Head:    Normocephalic, without obvious abnormality, atraumatic   Eyes:            Lids and lashes normal, conjunctivae and sclerae normal, no   icterus, no pallor, corneas clear, PERRLA   Ears:    Ears appear intact with no abnormalities noted   Throat:   No oral lesions, no thrush, oral mucosa moist   Neck:   No adenopathy, supple, trachea midline, no thyromegaly, no   carotid bruit, no JVD   Lungs:     Clear to auscultation,respirations regular, even and                  unlabored    Heart:    Regular rhythm and normal rate, normal S1 and S2, no            murmur, no gallop, no rub, no click   Chest Wall:    No abnormalities observed   Abdomen:     Normal bowel sounds, no masses, no organomegaly, soft        tender at 1'oclock from umbilicus, non-distended, no guarding, no rebound                tenderness   Extremities:   Moves all extremities well, no edema, no cyanosis, no             redness   Pulses:   Pulses palpable and equal bilaterally   Skin:   No bleeding, bruising or rash   Lymph nodes:   No palpable adenopathy   Neurologic:   Cranial nerves 2 - 12 grossly intact, sensation intact, DTR       present and equal bilaterally       Results Review:     Imaging Results (Last 24 Hours)       Procedure Component Value Units Date/Time    CT Abdomen Pelvis With Contrast [[id number]] Collected: 07/03/25 1832     Updated: 07/03/25 1837    Narrative:      CT ABDOMEN PELVIS W CONTRAST    Date of Exam: 7/3/2025 5:53 PM EDT    Indication: Epigastric abdominal pain, umbilical tenderness.    Comparison: 5/19/2025    Technique: Axial CT images were obtained of the abdomen and pelvis following the uneventful intravenous administration of iodinated contrast. Sagittal and coronal reconstructions  were performed.  Automated exposure control and iterative reconstruction   methods were used.        Findings:    Lower Chest: Lung bases clear    Organs: Liver, spleen, pancreas, kidneys, adrenal glands unremarkable. Gallbladder partly contracted due to recently ingested meal    Gastrointestinal: No intestinal distention or evident wall thickening. Normal appendix    Pelvis: No abnormal fluid collection. Metallic bodies in the prostate gland related to prostatic urethral lift procedure    Peritoneum/Retroperitoneum: No ascites or pneumoperitoneum. Unremarkable aorta    Bones/Soft Tissues: No acute bony abnormality      Impression:      No acute process demonstrated              Electronically Signed: Jayden Zelaya    7/3/2025 6:35 PM EDT    Workstation ID: [id number]             Lab Results (last 24 hours)       Procedure Component Value Units Date/Time    Comprehensive Metabolic Panel [[id number]]  (Abnormal) Collected: 07/03/25 1652    Specimen: Blood Updated: 07/03/25 1720     Glucose 112 mg/dL      BUN 16.4 mg/dL      Creatinine 1.34 mg/dL      Sodium 139 mmol/L      Potassium 4.0 mmol/L      Chloride 102 mmol/L      CO2 25.5 mmol/L      Calcium 9.5 mg/dL      Total Protein 7.2 g/dL      Albumin 4.8 g/dL      ALT (SGPT) 21 U/L      AST (SGOT) 29 U/L      Alkaline Phosphatase 92 U/L      Total Bilirubin 0.5 mg/dL      Globulin 2.4 gm/dL      A/G Ratio 2.0 g/dL      BUN/Creatinine Ratio 12.2     Anion Gap 11.5 mmol/L      eGFR 60.3 mL/min/1.73     Narrative:      GFR Categories in Chronic Kidney Disease (CKD)              GFR Category          GFR (mL/min/1.73)    Interpretation  G1                    90 or greater        Normal or high (1)  G2                    60-89                Mild decrease (1)  G3a                   45-59                Mild to moderate decrease  G3b                   30-44                Moderate to severe decrease  G4                    15-29                Severe decrease  G5                     14 or less           Kidney failure    (1)In the absence of evidence of kidney disease, neither GFR category G1 or G2 fulfill the criteria for CKD.    eGFR calculation 2021 CKD-EPI creatinine equation, which does not include race as a factor    Urinalysis With Culture If Indicated - Urine, Clean Catch [[id number]]  (Abnormal) Collected: 07/03/25 1653    Specimen: Urine, Clean Catch Updated: 07/03/25 1708     Color, UA Yellow     Appearance, UA Clear     pH, UA 6.0     Specific Gravity, UA 1.018     Glucose, UA Negative     Ketones, UA Trace     Bilirubin, UA Negative     Blood, UA Negative     Protein, UA Negative     Leuk Esterase, UA Trace     Nitrite, UA Negative     Urobilinogen, UA 1.0 E.U./dL    Narrative:      In absence of clinical symptoms, the presence of pyuria, bacteria, and/or nitrites on the urinalysis result does not correlate with infection.    Urinalysis, Microscopic Only - Urine, Clean Catch [[id number]] Collected: 07/03/25 1653    Specimen: Urine, Clean Catch Updated: 07/03/25 1708     RBC, UA 0-2 /HPF      WBC, UA 0-2 /HPF      Comment: Urine culture not indicated.        Bacteria, UA None Seen /HPF      Squamous Epithelial Cells, UA None Seen /HPF      Hyaline Casts, UA None Seen /LPF      Methodology Automated Microscopy    CBC & Differential [[id number]]  (Abnormal) Collected: 07/03/25 1652    Specimen: Blood Updated: 07/03/25 1702    Narrative:      The following orders were created for panel order CBC & Differential.  Procedure                               Abnormality         Status                     ---------                               -----------         ------                     CBC Auto Differential[[id number]]        Abnormal            Final result                 Please view results for these tests on the individual orders.    CBC Auto Differential [[id number]]  (Abnormal) Collected: 07/03/25 1652    Specimen: Blood Updated: 07/03/25 1702     WBC 5.97 10*3/mm3      RBC 3.51  10*6/mm3      Hemoglobin 10.7 g/dL      Hematocrit 32.9 %      MCV 93.7 fL      MCH 30.5 pg      MCHC 32.5 g/dL      RDW 12.8 %      RDW-SD 44.0 fl      MPV 9.0 fL      Platelets 282 10*3/mm3      Neutrophil % 58.5 %      Lymphocyte % 28.5 %      Monocyte % 7.4 %      Eosinophil % 4.2 %      Basophil % 1.2 %      Immature Grans % 0.2 %      Neutrophils, Absolute 3.50 10*3/mm3      Lymphocytes, Absolute 1.70 10*3/mm3      Monocytes, Absolute 0.44 10*3/mm3      Eosinophils, Absolute 0.25 10*3/mm3      Basophils, Absolute 0.07 10*3/mm3      Immature Grans, Absolute 0.01 10*3/mm3      nRBC 0.0 /100 WBC     Extra Tubes [[id number]] Collected: 07/03/25 1652    Specimen: Blood, Venous Line Updated: 07/03/25 1700    Narrative:      The following orders were created for panel order Extra Tubes.  Procedure                               Abnormality         Status                     ---------                               -----------         ------                     Gold Top - SST[[id number]]                                   Final result               Light Blue Top[[id number]]                                   Final result                 Please view results for these tests on the individual orders.    Gold Top - SST [[id number]] Collected: 07/03/25 1652    Specimen: Blood Updated: 07/03/25 1700     Extra Tube Hold for add-ons.     Comment: Auto resulted.       Light Blue Top [[id number]] Collected: 07/03/25 1652    Specimen: Blood Updated: 07/03/25 1700     Extra Tube Hold for add-ons.     Comment: Auto resulted                I reviewed the patient's new clinical results.    Assessment & Plan       Melena    Laryngopharyngeal reflux (LPR)    Essential hypertension    Retinal artery occlusion    Anxiety      Melena- on Xarelto   -consult GI, NPO after midnight   -PPI   -IV fluids    Anxiety- continue home medication    Consider hematology consult with concern of bleed while on ASA and Xarelto given recent history of retinal  occlusion.    VTE: Xarelto on hold,   CODE STATUS:  Code status (Patient has no pulse and is not breathing):  CPR (Attempt to Resuscitate)  Medical Interventions (Patient has pulse or is breathing):  Full Support  Level of Support Discussed with:  Patient    Admission Status:  I believe this patient meets inpatient status    Expected length of stay:  2 midnights or greater    I discussed the patient's findings and my recommendations with patient.     Josy Orantes, STEVEN  07/04/25  00:34 EDT

## 2025-07-04 NOTE — PLAN OF CARE
Problem: Adult Inpatient Plan of Care  Goal: Plan of Care Review  Outcome: Progressing  Flowsheets (Taken 7/4/2025 1633)  Progress: improving  Outcome Evaluation: Patient is stable. Minimal complaints of pain. Able to ambulate on his own. Wife at bedside and very supportive of patient. Patient shall be NPO at midnight for Possible scope with GI tomorrow. Safety measures in place. Call light within reach. Patient able to make needs known. POC ongoing.  Plan of Care Reviewed With:   patient   spouse  Goal: Patient-Specific Goal (Individualized)  Outcome: Progressing  Goal: Absence of Hospital-Acquired Illness or Injury  Outcome: Progressing  Intervention: Identify and Manage Fall Risk  Recent Flowsheet Documentation  Taken 7/4/2025 1620 by Gabriela Suárez, RN  Safety Promotion/Fall Prevention:   activity supervised   assistive device/personal items within reach   clutter free environment maintained   nonskid shoes/slippers when out of bed   room organization consistent   safety round/check completed  Taken 7/4/2025 1430 by Gabriela Suárez, RN  Safety Promotion/Fall Prevention:   activity supervised   assistive device/personal items within reach   clutter free environment maintained   nonskid shoes/slippers when out of bed   room organization consistent   safety round/check completed  Intervention: Prevent Infection  Recent Flowsheet Documentation  Taken 7/4/2025 1620 by Gabriela Suárez, RN  Infection Prevention:   hand hygiene promoted   personal protective equipment utilized   single patient room provided  Taken 7/4/2025 1430 by Gabriela Suárez, RN  Infection Prevention:   hand hygiene promoted   personal protective equipment utilized   single patient room provided  Goal: Optimal Comfort and Wellbeing  Outcome: Progressing  Goal: Readiness for Transition of Care  Outcome: Progressing   Goal Outcome Evaluation:  Plan of Care Reviewed With: patient, spouse        Progress: improving  Outcome Evaluation: Patient is  stable. Minimal complaints of pain. Able to ambulate on his own. Wife at bedside and very supportive of patient. Patient shall be NPO at midnight for Possible scope with GI tomorrow. Safety measures in place. Call light within reach. Patient able to make needs known. POC ongoing.

## 2025-07-04 NOTE — PLAN OF CARE
Goal Outcome Evaluation:              Outcome Evaluation: Pt admitted to unit from ED for ABD pain, melena. Pt VSS, AOx4 and able to make needs known. Pt NPO as of midnight. GI consult has been called. No c/o pain or discomfort at this time. Pt resting comfortably with call light within reach. Plan of care ongoing

## 2025-07-04 NOTE — CONSULTS
GI CONSULT  NOTE:    Referring Provider:  Cierra Cueva MD    Chief complaint: Melena    Subjective .     History of present illness: Flip Vang is a 60 y.o. male with PMH +Lupus antioag on xarelto (followed by Dr. 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 He presented to the ER due to melena at the recommendation of his vascular surgeon.  GI was consulted for melena.    Patient was recently started on Xarelto approximately a month ago due to new A-fib.  He started noticing melena for the past week.  He has not had a bowel movement today.  His last episode of melena was yesterday in the morning.  His last dose of Xarelto was 6 PM Wednesday night.  He denies any use of Pepto-Bismol or iron.  At home he takes omeprazole daily.  At times he has trouble with constipation in which case he takes MiraFast as needed.       Endo History:  1/29/2024 EGD/colonoscopy (Dr. Fitzpatrick) esophageal stricture dilated to 50 French. Erosive gastritis. Stomach biopsy showed minimal chronic gastritis, negative H. pylori. TA polyps. Recall 2029.  4/2019 colonoscopy by Dr. Cummings showed normal mucosa in whole colon, grade 2 internal hemorrhoids.    Past Medical History:  Past Medical History:   Diagnosis Date    Laryngopharyngeal reflux (LPR)        Past Surgical History:  Past Surgical History:   Procedure Laterality Date    CARDIAC CATHETERIZATION N/A 5/16/2025    Procedure: Left Heart Cath radial;  Surgeon: Richard Moore DO;  Location: [facility] CATH INVASIVE LOCATION;  Service: Cardiovascular;  Laterality: N/A;    COLONOSCOPY      COLONOSCOPY N/A 1/29/2024    Procedure: COLONOSCOPY WITH POLYPECTOMY X1;  Surgeon: rTe Fitzpatrick MD;  Location: [facility] ENDOSCOPY;  Service: Gastroenterology;  Laterality: N/A;  POST: POLYP    ENDOSCOPY N/A 1/29/2024    Procedure: ESOPHAGOGASTRODUODENOSCOPY WITH BIOPSY X1 AREA AND DILATION UP TO 50;  Surgeon: Tre Fitzpatrick MD;   Location: [facility] ENDOSCOPY;  Service: Gastroenterology;  Laterality: N/A;  POST: GASTRITIS, ESOPHAGEAL STRICTURE       Social History:  Social History     Tobacco Use    Smoking status: Never     Passive exposure: Never    Smokeless tobacco: Never   Vaping Use    Vaping status: Never Used   Substance Use Topics    Alcohol use: Yes     Comment: Occasional    Drug use: Never       Family History:  Family History   Problem Relation Age of Onset    Cancer Father        Medications:  Medications Prior to Admission   Medication Sig Dispense Refill Last Dose/Taking    acetaminophen (TYLENOL) 325 MG tablet Take 2 tablets by mouth Every 6 (Six) Hours As Needed for Mild Pain.   Taking As Needed    aspirin 81 MG EC tablet Take 1 tablet by mouth Daily. 30 tablet 5 7/3/2025 Morning    atorvastatin (LIPITOR) 80 MG tablet Take 1 tablet by mouth Every Night. 90 tablet 3 7/2/2025 Bedtime    desvenlafaxine (PRISTIQ) 50 MG 24 hr tablet Take 1 tablet by mouth Daily.   7/3/2025 Morning    LORazepam (ATIVAN) 0.5 MG tablet TAKE 1 OR 2 TABLETS BY MOUTH 2 TIMES A DAY AS NEEDED   Taking    omeprazole (priLOSEC) 40 MG capsule Take 1 capsule by mouth Daily.   7/3/2025 Morning    rivaroxaban (XARELTO) 20 MG tablet Take 1 tablet by mouth Every Night.   7/2/2025 Bedtime       Scheduled Meds:[Held by provider] aspirin, 81 mg, Oral, Daily  [Held by provider] atorvastatin, 80 mg, Oral, Nightly  desvenlafaxine, 50 mg, Oral, Daily  pantoprazole, 40 mg, Intravenous, BID AC  sodium chloride, 10 mL, Intravenous, Q12H      Continuous Infusions:sodium chloride, 50 mL/hr, Last Rate: 50 mL/hr (07/03/25 7074)      PRN Meds:.  acetaminophen **OR** acetaminophen **OR** acetaminophen    Calcium Replacement - Follow Nurse / BPA Driven Protocol    LORazepam    Magnesium Standard Dose Replacement - Follow Nurse / BPA Driven Protocol    nitroglycerin    ondansetron    Phosphorus Replacement - Follow Nurse / BPA Driven Protocol    Potassium Replacement - Follow  "Nurse / BPA Driven Protocol    sodium chloride    sodium chloride    ALLERGIES:  Patient has no known allergies.    ROS:  Review of Systems    The following systems were reviewed and negative;   Constitution:  No fevers, chills, no unintentional weight loss  Skin: no rash, no jaundice  Eyes:  No blurry vision, no eye pain  HENT:  No change in hearing or smell  Resp:  No dyspnea or cough  CV:  No chest pain or palpitations  :  No dysuria, hematuria  Musculoskeletal:  No leg cramps or arthralgias  Neuro:  No tremor, no numbness  Psych:  No depression or confusion    Objective     Vital Signs:   Vitals:    07/03/25 2042 07/03/25 2322 07/04/25 0413 07/04/25 0759   BP: 156/73 122/70 114/73 132/66   BP Location: Left arm Left arm Left arm Left arm   Patient Position: Lying Lying Lying Lying   Pulse: 61 76 70 70   Resp: 16 13 19 16   Temp: 97.7 °F (36.5 °C) 97.6 °F (36.4 °C) 97.9 °F (36.6 °C) 97.9 °F (36.6 °C)   TempSrc: Oral Oral Oral Oral   SpO2: 100% 99% 99% 99%   Weight:       Height: 182.9 cm (72\")          Physical Exam:     General Appearance:    Awake and alert, in no acute distress   Head:    Normocephalic, without obvious abnormality, atraumatic   Eyes:            Conjunctivae normal, anicteric sclerae, pupils equal   Ears:    Ears appear intact with no abnormalities noted   Throat:   No oral lesions, no thrush, oral mucosa moist   Neck:   Supple, no JVD   Lungs:     Respirations regular, even and unlabored       Chest Wall:    No abnormalities observed   Abdomen:     soft, umbilical tender, no rebound or guarding, nondistended   Rectal:     Deferred   Extremities:   Moves all extremities, no edema, no cyanosis   Pulses:   Pulses palpable and equal bilaterally   Skin:   No rash, no jaundice, normal palpation             Results Review:   I reviewed the patient's labs and imaging.  CBC    Results from last 7 days   Lab Units 07/04/25  0446 07/03/25  1652   WBC 10*3/mm3 4.48 5.97   HEMOGLOBIN g/dL 9.7* 10.7* "   PLATELETS 10*3/mm3 239 282     CMP   Results from last 7 days   Lab Units 07/04/25  0446 07/03/25  1652   SODIUM mmol/L 142 139   POTASSIUM mmol/L 4.1 4.0   CHLORIDE mmol/L 107 102   CO2 mmol/L 24.9 25.5   BUN mg/dL 13.8 16.4   CREATININE mg/dL 1.20 1.34*   GLUCOSE mg/dL 103* 112*   ALBUMIN g/dL  --  4.8   BILIRUBIN mg/dL  --  0.5   ALK PHOS U/L  --  92   AST (SGOT) U/L  --  29   ALT (SGPT) U/L  --  21     Cr Clearance Estimated Creatinine Clearance: 70.2 mL/min (by C-G formula based on SCr of 1.2 mg/dL).  Coag     HbA1C   Lab Results   Component Value Date    HGBA1C 5.37 05/19/2025     Blood Glucose   Glucose   Date/Time Value Ref Range Status   07/04/2025 1013 86 70 - 105 mg/dL Final     Comment:     Serial Number: [id number]:  [id number]     Infection     UA    Results from last 7 days   Lab Units 07/03/25  1653   NITRITE UA  Negative   WBC UA /HPF 0-2   BACTERIA UA /HPF None Seen   SQUAM EPITHEL UA /HPF None Seen     Radiology(recent) CT Abdomen Pelvis With Contrast  Result Date: 7/3/2025  No acute process demonstrated Electronically Signed: Jayden Zelaya  7/3/2025 6:35 PM EDT  Workstation ID: [id number]        ASSESSMENT AND PLAN:  60 y.o. male with PMH +Lupus antioag on xarelto (followed by Dr. 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 He presented to the ER due to melena at the recommendation of his vascular surgeon.  GI was consulted for melena.  He was recently started on Xarelto approximately 1 month ago.    Problem List:  Melena  Iron deficiency anemia  + Lupus anticoagulant on Xarelto, being held    Plan:  - Okay for diet today, n.p.o. after midnight.  Will reassess tomorrow morning and consider EGD.  - PPI twice daily.  - Continue to hold Xarelto.  Last dose was 6 PM Wednesday night.  - MiraLAX for constipation.  - IV iron  - Patient is known to our practice.  He was seen in 3/2025.  At this time he follows with [facility] due to his insurance.   He is scheduled to see [facility] 8/2025.  - Followed by Dr. Kearney due to + lupus anticoagulant      I discussed the patients findings and my recommendations with the patient. Patient was seen with GI attending, addendum to follow. We appreciate the referral.    Electronically signed by Walter Xie PA-C, 07/04/25, 11:44 AM EDT.

## 2025-07-04 NOTE — PROGRESS NOTES
LOS: 0 days   Patient Care Team:  Aristeo Rodriguez MD as PCP - General (Family Medicine)  Aristeo Rodriguez MD as PCP - Family Medicine  Aristeo Rodriguez MD    Subjective     Interval History: Stable overnight    Patient Complaints: No melena throughout the night.  No recent increase in GERD symptoms.  Fairly recent colonoscopy was unremarkable by patient report.  No recent NSAIDs other than low dose aspirin    History taken from: patient    Review of Systems   Constitutional:  Negative for activity change, appetite change, chills, diaphoresis, fatigue and fever.   HENT:  Negative for congestion and mouth sores.    Eyes:  Negative for visual disturbance.   Respiratory:  Negative for cough, shortness of breath, wheezing and stridor.    Cardiovascular:  Negative for chest pain and leg swelling.   Gastrointestinal:  Positive for blood in stool. Negative for abdominal pain, constipation, diarrhea, nausea and vomiting.   Genitourinary:  Negative for dysuria.   Musculoskeletal:  Negative for arthralgias and back pain.   Neurological:  Negative for tremors, seizures and weakness.   Psychiatric/Behavioral:  Negative for confusion.            Objective     Vital Signs  Temp:  [97.6 °F (36.4 °C)-98.8 °F (37.1 °C)] 97.9 °F (36.6 °C)  Heart Rate:  [52-89] 70  Resp:  [13-19] 16  BP: (114-167)/(66-80) 132/66    Physical Exam:     General Appearance:    Alert, cooperative, in no acute distress,   Head:    Normocephalic, without obvious abnormality, atraumatic   Eyes:            Lids and lashes normal, conjunctivae and sclerae normal, no   icterus, no pallor, corneas clear, PERRLA   Ears:    Ears appear intact with no abnormalities noted   Throat:   No oral lesions, no thrush, oral mucosa moist   Neck:   No adenopathy, supple, trachea midline, no thyromegaly, no   carotid bruit, no JVD   Lungs:     Clear to auscultation,respirations regular, even and                  unlabored    Heart:    Regular rhythm and normal rate, normal S1  and S2, no            murmur, no gallop, no rub, no click   Chest Wall:    No abnormalities observed   Abdomen:     Normal bowel sounds, no masses, no organomegaly, soft        Non-tender non-distended, no guarding,   Extremities:   Moves all extremities well, no edema, no cyanosis, no             Redness   Pulses:   Pulses palpable and equal bilaterally   Skin:   No bleeding, bruising or rash   Lymph nodes:   No palpable adenopathy   Neurologic:   Cranial nerves 2 - 12 grossly intact, sensation intact, DTR       present and equal bilaterally        Results Review:    Lab Results (last 24 hours)       Procedure Component Value Units Date/Time    Basic Metabolic Panel [[id number]]  (Abnormal) Collected: 07/04/25 0446    Specimen: Blood Updated: 07/04/25 0530     Glucose 103 mg/dL      BUN 13.8 mg/dL      Creatinine 1.20 mg/dL      Sodium 142 mmol/L      Potassium 4.1 mmol/L      Chloride 107 mmol/L      CO2 24.9 mmol/L      Calcium 8.2 mg/dL      BUN/Creatinine Ratio 11.5     Anion Gap 10.1 mmol/L      eGFR 68.8 mL/min/1.73     Narrative:      GFR Categories in Chronic Kidney Disease (CKD)              GFR Category          GFR (mL/min/1.73)    Interpretation  G1                    90 or greater        Normal or high (1)  G2                    60-89                Mild decrease (1)  G3a                   45-59                Mild to moderate decrease  G3b                   30-44                Moderate to severe decrease  G4                    15-29                Severe decrease  G5                    14 or less           Kidney failure    (1)In the absence of evidence of kidney disease, neither GFR category G1 or G2 fulfill the criteria for CKD.    eGFR calculation 2021 CKD-EPI creatinine equation, which does not include race as a factor    CBC (No Diff) [[id number]]  (Abnormal) Collected: 07/04/25 0446    Specimen: Blood Updated: 07/04/25 0501     WBC 4.48 10*3/mm3      RBC 3.24 10*6/mm3      Hemoglobin 9.7 g/dL       Hematocrit 30.4 %      MCV 93.8 fL      MCH 29.9 pg      MCHC 31.9 g/dL      RDW 12.8 %      RDW-SD 44.3 fl      MPV 9.3 fL      Platelets 239 10*3/mm3     Comprehensive Metabolic Panel [[id number]]  (Abnormal) Collected: 07/03/25 1652    Specimen: Blood Updated: 07/03/25 1720     Glucose 112 mg/dL      BUN 16.4 mg/dL      Creatinine 1.34 mg/dL      Sodium 139 mmol/L      Potassium 4.0 mmol/L      Chloride 102 mmol/L      CO2 25.5 mmol/L      Calcium 9.5 mg/dL      Total Protein 7.2 g/dL      Albumin 4.8 g/dL      ALT (SGPT) 21 U/L      AST (SGOT) 29 U/L      Alkaline Phosphatase 92 U/L      Total Bilirubin 0.5 mg/dL      Globulin 2.4 gm/dL      A/G Ratio 2.0 g/dL      BUN/Creatinine Ratio 12.2     Anion Gap 11.5 mmol/L      eGFR 60.3 mL/min/1.73     Narrative:      GFR Categories in Chronic Kidney Disease (CKD)              GFR Category          GFR (mL/min/1.73)    Interpretation  G1                    90 or greater        Normal or high (1)  G2                    60-89                Mild decrease (1)  G3a                   45-59                Mild to moderate decrease  G3b                   30-44                Moderate to severe decrease  G4                    15-29                Severe decrease  G5                    14 or less           Kidney failure    (1)In the absence of evidence of kidney disease, neither GFR category G1 or G2 fulfill the criteria for CKD.    eGFR calculation 2021 CKD-EPI creatinine equation, which does not include race as a factor    Urinalysis With Culture If Indicated - Urine, Clean Catch [[id number]]  (Abnormal) Collected: 07/03/25 1653    Specimen: Urine, Clean Catch Updated: 07/03/25 1708     Color, UA Yellow     Appearance, UA Clear     pH, UA 6.0     Specific Gravity, UA 1.018     Glucose, UA Negative     Ketones, UA Trace     Bilirubin, UA Negative     Blood, UA Negative     Protein, UA Negative     Leuk Esterase, UA Trace     Nitrite, UA Negative     Urobilinogen, UA 1.0  E.U./dL    Narrative:      In absence of clinical symptoms, the presence of pyuria, bacteria, and/or nitrites on the urinalysis result does not correlate with infection.    Urinalysis, Microscopic Only - Urine, Clean Catch [[id number]] Collected: 07/03/25 1653    Specimen: Urine, Clean Catch Updated: 07/03/25 1708     RBC, UA 0-2 /HPF      WBC, UA 0-2 /HPF      Comment: Urine culture not indicated.        Bacteria, UA None Seen /HPF      Squamous Epithelial Cells, UA None Seen /HPF      Hyaline Casts, UA None Seen /LPF      Methodology Automated Microscopy    CBC & Differential [[id number]]  (Abnormal) Collected: 07/03/25 1652    Specimen: Blood Updated: 07/03/25 1702    Narrative:      The following orders were created for panel order CBC & Differential.  Procedure                               Abnormality         Status                     ---------                               -----------         ------                     CBC Auto Differential[[id number]]        Abnormal            Final result                 Please view results for these tests on the individual orders.    CBC Auto Differential [[id number]]  (Abnormal) Collected: 07/03/25 1652    Specimen: Blood Updated: 07/03/25 1702     WBC 5.97 10*3/mm3      RBC 3.51 10*6/mm3      Hemoglobin 10.7 g/dL      Hematocrit 32.9 %      MCV 93.7 fL      MCH 30.5 pg      MCHC 32.5 g/dL      RDW 12.8 %      RDW-SD 44.0 fl      MPV 9.0 fL      Platelets 282 10*3/mm3      Neutrophil % 58.5 %      Lymphocyte % 28.5 %      Monocyte % 7.4 %      Eosinophil % 4.2 %      Basophil % 1.2 %      Immature Grans % 0.2 %      Neutrophils, Absolute 3.50 10*3/mm3      Lymphocytes, Absolute 1.70 10*3/mm3      Monocytes, Absolute 0.44 10*3/mm3      Eosinophils, Absolute 0.25 10*3/mm3      Basophils, Absolute 0.07 10*3/mm3      Immature Grans, Absolute 0.01 10*3/mm3      nRBC 0.0 /100 WBC     Extra Tubes [[id number]] Collected: 07/03/25 1652    Specimen: Blood, Venous Line Updated:  07/03/25 1700    Narrative:      The following orders were created for panel order Extra Tubes.  Procedure                               Abnormality         Status                     ---------                               -----------         ------                     Gold Top - SST[[id number]]                                   Final result               Light Blue Top[[id number]]                                   Final result                 Please view results for these tests on the individual orders.    Gold Top - SST [[id number]] Collected: 07/03/25 1652    Specimen: Blood Updated: 07/03/25 1700     Extra Tube Hold for add-ons.     Comment: Auto resulted.       Light Blue Top [[id number]] Collected: 07/03/25 1652    Specimen: Blood Updated: 07/03/25 1700     Extra Tube Hold for add-ons.     Comment: Auto resulted                Imaging Results (Last 24 Hours)       Procedure Component Value Units Date/Time    CT Abdomen Pelvis With Contrast [[id number]] Collected: 07/03/25 1832     Updated: 07/03/25 1837    Narrative:      CT ABDOMEN PELVIS W CONTRAST    Date of Exam: 7/3/2025 5:53 PM EDT    Indication: Epigastric abdominal pain, umbilical tenderness.    Comparison: 5/19/2025    Technique: Axial CT images were obtained of the abdomen and pelvis following the uneventful intravenous administration of iodinated contrast. Sagittal and coronal reconstructions were performed.  Automated exposure control and iterative reconstruction   methods were used.        Findings:    Lower Chest: Lung bases clear    Organs: Liver, spleen, pancreas, kidneys, adrenal glands unremarkable. Gallbladder partly contracted due to recently ingested meal    Gastrointestinal: No intestinal distention or evident wall thickening. Normal appendix    Pelvis: No abnormal fluid collection. Metallic bodies in the prostate gland related to prostatic urethral lift procedure    Peritoneum/Retroperitoneum: No ascites or pneumoperitoneum. Unremarkable  aorta    Bones/Soft Tissues: No acute bony abnormality      Impression:      No acute process demonstrated              Electronically Signed: Jayden Zelaya    7/3/2025 6:35 PM EDT    Workstation ID: [id number]                 I reviewed the patient's new clinical results.    Medication Review:   Scheduled Meds:[Held by provider] aspirin, 81 mg, Oral, Daily  [Held by provider] atorvastatin, 80 mg, Oral, Nightly  desvenlafaxine, 50 mg, Oral, Daily  pantoprazole, 40 mg, Intravenous, BID AC  sodium chloride, 10 mL, Intravenous, Q12H      Continuous Infusions:sodium chloride, 50 mL/hr, Last Rate: 50 mL/hr (07/03/25 9087)      PRN Meds:.  acetaminophen **OR** acetaminophen **OR** acetaminophen    Calcium Replacement - Follow Nurse / BPA Driven Protocol    LORazepam    Magnesium Standard Dose Replacement - Follow Nurse / BPA Driven Protocol    nitroglycerin    ondansetron    Phosphorus Replacement - Follow Nurse / BPA Driven Protocol    Potassium Replacement - Follow Nurse / BPA Driven Protocol    sodium chloride    sodium chloride     Assessment & Plan       Melena    Laryngopharyngeal reflux (LPR)    Essential hypertension    Retinal artery occlusion    Anxiety    - checking TIBC, reticulocytes; recent folate and b12 were normal; GI consulted to consider EGD  - Continue home meds for anxiety  -IV PPI  - holding anticoaguation        CODE Status:    Code status (Patient has no pulse and is not breathing):  CPR (Attempt to Resuscitate)  Medical Interventions (Patient has pulse or is breathing):  Full support  Level of support discussed with:  Patient    Admission status:  I believe this patient meets observation status  Expected length of stay:  1 midnights or greater  I discussed the patient's findings and my recommendations with the patient.    Plan for disposition:Home at discharge    Cierra Cueva MD  07/04/25  08:27 EDT

## 2025-07-05 ENCOUNTER — ANESTHESIA EVENT (OUTPATIENT)
Dept: GASTROENTEROLOGY | Facility: HOSPITAL | Age: 60
End: 2025-07-05
Payer: COMMERCIAL

## 2025-07-05 ENCOUNTER — ANESTHESIA (OUTPATIENT)
Dept: GASTROENTEROLOGY | Facility: HOSPITAL | Age: 60
End: 2025-07-05
Payer: COMMERCIAL

## 2025-07-05 ENCOUNTER — INPATIENT HOSPITAL (AMBULATORY)
Dept: URBAN - METROPOLITAN AREA HOSPITAL 84 | Facility: HOSPITAL | Age: 60
End: 2025-07-05
Payer: COMMERCIAL

## 2025-07-05 DIAGNOSIS — K92.1 MELENA: ICD-10-CM

## 2025-07-05 DIAGNOSIS — K29.50 UNSPECIFIED CHRONIC GASTRITIS WITHOUT BLEEDING: ICD-10-CM

## 2025-07-05 LAB
ANION GAP SERPL CALCULATED.3IONS-SCNC: 9 MMOL/L (ref 5–15)
BASOPHILS # BLD AUTO: 0.05 10*3/MM3 (ref 0–0.2)
BASOPHILS NFR BLD AUTO: 1 % (ref 0–1.5)
BUN SERPL-MCNC: 13.5 MG/DL (ref 8–23)
BUN/CREAT SERPL: 9.3 (ref 7–25)
CALCIUM SPEC-SCNC: 8.4 MG/DL (ref 8.6–10.5)
CHLORIDE SERPL-SCNC: 107 MMOL/L (ref 98–107)
CO2 SERPL-SCNC: 25 MMOL/L (ref 22–29)
CREAT SERPL-MCNC: 1.45 MG/DL (ref 0.76–1.27)
DEPRECATED RDW RBC AUTO: 44.1 FL (ref 37–54)
EGFRCR SERPLBLD CKD-EPI 2021: 54.8 ML/MIN/1.73
EOSINOPHIL # BLD AUTO: 0.29 10*3/MM3 (ref 0–0.4)
EOSINOPHIL NFR BLD AUTO: 6 % (ref 0.3–6.2)
ERYTHROCYTE [DISTWIDTH] IN BLOOD BY AUTOMATED COUNT: 12.7 % (ref 12.3–15.4)
GLUCOSE SERPL-MCNC: 122 MG/DL (ref 65–99)
HCT VFR BLD AUTO: 31.2 % (ref 37.5–51)
HGB BLD-MCNC: 9.9 G/DL (ref 13–17.7)
IMM GRANULOCYTES # BLD AUTO: 0.01 10*3/MM3 (ref 0–0.05)
IMM GRANULOCYTES NFR BLD AUTO: 0.2 % (ref 0–0.5)
INR PPP: 1.04 (ref 0.9–1.1)
LYMPHOCYTES # BLD AUTO: 1.52 10*3/MM3 (ref 0.7–3.1)
LYMPHOCYTES NFR BLD AUTO: 31.2 % (ref 19.6–45.3)
MCH RBC QN AUTO: 30.1 PG (ref 26.6–33)
MCHC RBC AUTO-ENTMCNC: 31.7 G/DL (ref 31.5–35.7)
MCV RBC AUTO: 94.8 FL (ref 79–97)
MONOCYTES # BLD AUTO: 0.4 10*3/MM3 (ref 0.1–0.9)
MONOCYTES NFR BLD AUTO: 8.2 % (ref 5–12)
NEUTROPHILS NFR BLD AUTO: 2.6 10*3/MM3 (ref 1.7–7)
NEUTROPHILS NFR BLD AUTO: 53.4 % (ref 42.7–76)
NRBC BLD AUTO-RTO: 0 /100 WBC (ref 0–0.2)
PLATELET # BLD AUTO: 259 10*3/MM3 (ref 140–450)
PMV BLD AUTO: 9.3 FL (ref 6–12)
POTASSIUM SERPL-SCNC: 4.1 MMOL/L (ref 3.5–5.2)
PROTHROMBIN TIME: 13.5 SECONDS (ref 11.7–14.2)
RBC # BLD AUTO: 3.29 10*6/MM3 (ref 4.14–5.8)
SODIUM SERPL-SCNC: 141 MMOL/L (ref 136–145)
WBC NRBC COR # BLD AUTO: 4.87 10*3/MM3 (ref 3.4–10.8)

## 2025-07-05 PROCEDURE — G0378 HOSPITAL OBSERVATION PER HR: HCPCS

## 2025-07-05 PROCEDURE — 85025 COMPLETE CBC W/AUTO DIFF WBC: CPT

## 2025-07-05 PROCEDURE — 25010000002 ENOXAPARIN PER 10 MG: Performed by: NURSE PRACTITIONER

## 2025-07-05 PROCEDURE — 43235 EGD DIAGNOSTIC BRUSH WASH: CPT | Performed by: INTERNAL MEDICINE

## 2025-07-05 PROCEDURE — 96361 HYDRATE IV INFUSION ADD-ON: CPT

## 2025-07-05 PROCEDURE — 25010000002 LIDOCAINE PF 2% 2 % SOLUTION: Performed by: NURSE ANESTHETIST, CERTIFIED REGISTERED

## 2025-07-05 PROCEDURE — 85610 PROTHROMBIN TIME: CPT | Performed by: INTERNAL MEDICINE

## 2025-07-05 PROCEDURE — 25010000002 PROPOFOL 10 MG/ML EMULSION: Performed by: NURSE ANESTHETIST, CERTIFIED REGISTERED

## 2025-07-05 PROCEDURE — 80048 BASIC METABOLIC PNL TOTAL CA: CPT

## 2025-07-05 PROCEDURE — 99254 IP/OBS CNSLTJ NEW/EST MOD 60: CPT | Performed by: INTERNAL MEDICINE

## 2025-07-05 PROCEDURE — 96376 TX/PRO/DX INJ SAME DRUG ADON: CPT

## 2025-07-05 RX ORDER — ONDANSETRON 2 MG/ML
4 INJECTION INTRAMUSCULAR; INTRAVENOUS ONCE AS NEEDED
Status: DISCONTINUED | OUTPATIENT
Start: 2025-07-05 | End: 2025-07-05 | Stop reason: HOSPADM

## 2025-07-05 RX ORDER — HYDRALAZINE HYDROCHLORIDE 20 MG/ML
5 INJECTION INTRAMUSCULAR; INTRAVENOUS
Status: DISCONTINUED | OUTPATIENT
Start: 2025-07-05 | End: 2025-07-05 | Stop reason: HOSPADM

## 2025-07-05 RX ORDER — PROPOFOL 10 MG/ML
VIAL (ML) INTRAVENOUS AS NEEDED
Status: DISCONTINUED | OUTPATIENT
Start: 2025-07-05 | End: 2025-07-05 | Stop reason: SURG

## 2025-07-05 RX ORDER — LIDOCAINE HYDROCHLORIDE 20 MG/ML
INJECTION, SOLUTION EPIDURAL; INFILTRATION; INTRACAUDAL; PERINEURAL AS NEEDED
Status: DISCONTINUED | OUTPATIENT
Start: 2025-07-05 | End: 2025-07-05 | Stop reason: SURG

## 2025-07-05 RX ORDER — SODIUM CHLORIDE 9 MG/ML
100 INJECTION, SOLUTION INTRAVENOUS CONTINUOUS
Status: DISPENSED | OUTPATIENT
Start: 2025-07-05 | End: 2025-07-06

## 2025-07-05 RX ORDER — SODIUM CHLORIDE 9 MG/ML
INJECTION, SOLUTION INTRAVENOUS CONTINUOUS PRN
Status: DISCONTINUED | OUTPATIENT
Start: 2025-07-05 | End: 2025-07-05 | Stop reason: SURG

## 2025-07-05 RX ORDER — EPHEDRINE SULFATE 5 MG/ML
5 INJECTION INTRAVENOUS ONCE AS NEEDED
Status: DISCONTINUED | OUTPATIENT
Start: 2025-07-05 | End: 2025-07-05 | Stop reason: HOSPADM

## 2025-07-05 RX ORDER — ENOXAPARIN SODIUM 100 MG/ML
1 INJECTION SUBCUTANEOUS EVERY 12 HOURS
Status: DISCONTINUED | OUTPATIENT
Start: 2025-07-05 | End: 2025-07-07 | Stop reason: HOSPADM

## 2025-07-05 RX ORDER — LABETALOL HYDROCHLORIDE 5 MG/ML
5 INJECTION, SOLUTION INTRAVENOUS
Status: DISCONTINUED | OUTPATIENT
Start: 2025-07-05 | End: 2025-07-05 | Stop reason: HOSPADM

## 2025-07-05 RX ORDER — PANTOPRAZOLE SODIUM 40 MG/1
40 TABLET, DELAYED RELEASE ORAL DAILY
Qty: 30 TABLET | Refills: 1 | Status: SHIPPED | OUTPATIENT
Start: 2025-07-05 | End: 2025-07-07 | Stop reason: HOSPADM

## 2025-07-05 RX ORDER — IPRATROPIUM BROMIDE AND ALBUTEROL SULFATE 2.5; .5 MG/3ML; MG/3ML
3 SOLUTION RESPIRATORY (INHALATION) ONCE AS NEEDED
Status: DISCONTINUED | OUTPATIENT
Start: 2025-07-05 | End: 2025-07-05 | Stop reason: HOSPADM

## 2025-07-05 RX ORDER — DIPHENHYDRAMINE HYDROCHLORIDE 50 MG/ML
12.5 INJECTION, SOLUTION INTRAMUSCULAR; INTRAVENOUS
Status: DISCONTINUED | OUTPATIENT
Start: 2025-07-05 | End: 2025-07-05 | Stop reason: HOSPADM

## 2025-07-05 RX ADMIN — LORAZEPAM 0.5 MG: 0.5 TABLET ORAL at 21:50

## 2025-07-05 RX ADMIN — ATORVASTATIN CALCIUM 80 MG: 40 TABLET, FILM COATED ORAL at 20:36

## 2025-07-05 RX ADMIN — Medication 10 ML: at 20:38

## 2025-07-05 RX ADMIN — SODIUM CHLORIDE: 9 INJECTION, SOLUTION INTRAVENOUS at 10:40

## 2025-07-05 RX ADMIN — PANTOPRAZOLE SODIUM 40 MG: 40 INJECTION, POWDER, FOR SOLUTION INTRAVENOUS at 18:18

## 2025-07-05 RX ADMIN — ACETAMINOPHEN 650 MG: 325 TABLET ORAL at 00:21

## 2025-07-05 RX ADMIN — LIDOCAINE HYDROCHLORIDE 100 MG: 20 INJECTION, SOLUTION EPIDURAL; INFILTRATION; INTRACAUDAL; PERINEURAL at 10:42

## 2025-07-05 RX ADMIN — PROPOFOL 100 MG: 10 INJECTION, EMULSION INTRAVENOUS at 10:42

## 2025-07-05 RX ADMIN — ENOXAPARIN SODIUM 80 MG: 100 INJECTION SUBCUTANEOUS at 18:18

## 2025-07-05 RX ADMIN — PANTOPRAZOLE SODIUM 40 MG: 40 INJECTION, POWDER, FOR SOLUTION INTRAVENOUS at 09:54

## 2025-07-05 RX ADMIN — PROPOFOL 40 MG: 10 INJECTION, EMULSION INTRAVENOUS at 10:44

## 2025-07-05 NOTE — DISCHARGE SUMMARY
Date of Discharge:  7/5/2025    Discharge Diagnosis:   Melena  Laryngopharyngeal reflux (LPR)  Essential hypertension  Retinal artery occlusion  Anxiety  Lupus    Presenting Problem/History of Present Illness  Active Hospital Problems    Diagnosis  POA    **Melena [K92.1]  Yes    Anxiety [F41.9]  Yes    Retinal artery occlusion [H34.9]  Yes    Laryngopharyngeal reflux (LPR) [K21.9]  Yes    Essential hypertension [I10]  Yes      Resolved Hospital Problems   No resolved problems to display.          Hospital Course    Patient is a 60 y.o. male presented with PMH +Lupus antioag on xarelto (followed by Dr. 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 He presented to the ER due to melena at the recommendation of his vascular surgeon.  GI was consulted for melena. Patient was recently started on Xarelto approximately a month ago due tan episode of new A-fib that resolved and was caught on monitor. He started noticing melena for the past week. He had an EGD with minimal erythema in the stomach antrum with one area that appeared to be healed ulcer/erosion in the antrum. Recommendation is to continue PPI, anticoagulation and monitor cbc weekly. Hematology was consulted as Dr Kearney follows the patient for hypercoagulability work up and was started on Xarelto. He will need to follow up with PCP and Dr Kearney as planned. I have placed orders for CBC at [facility] in one week and then 2 weeks      Procedures Performed    Procedure(s):  ESOPHAGOGASTRODUODENOSCOPY  -------------------  07/05 1016 Upper GI Endoscopy    Consults:   Consults       Date and Time Order Name Status Description    7/5/2025  9:01 AM Hematology & Oncology Inpatient Consult      7/3/2025  7:21 PM Inpatient Gastroenterology Consult Completed             Pertinent Test Results:    Lab Results (most recent)       Procedure Component Value Units Date/Time    Basic Metabolic Panel [[id number]]  (Abnormal) Collected:  07/05/25 0042    Specimen: Blood Updated: 07/05/25 0114     Glucose 122 mg/dL      BUN 13.5 mg/dL      Creatinine 1.45 mg/dL      Sodium 141 mmol/L      Potassium 4.1 mmol/L      Chloride 107 mmol/L      CO2 25.0 mmol/L      Calcium 8.4 mg/dL      BUN/Creatinine Ratio 9.3     Anion Gap 9.0 mmol/L      eGFR 54.8 mL/min/1.73     Narrative:      GFR Categories in Chronic Kidney Disease (CKD)              GFR Category          GFR (mL/min/1.73)    Interpretation  G1                    90 or greater        Normal or high (1)  G2                    60-89                Mild decrease (1)  G3a                   45-59                Mild to moderate decrease  G3b                   30-44                Moderate to severe decrease  G4                    15-29                Severe decrease  G5                    14 or less           Kidney failure    (1)In the absence of evidence of kidney disease, neither GFR category G1 or G2 fulfill the criteria for CKD.    eGFR calculation 2021 CKD-EPI creatinine equation, which does not include race as a factor    Protime-INR [[id number]]  (Normal) Collected: 07/05/25 0042    Specimen: Blood Updated: 07/05/25 0059     Protime 13.5 Seconds      INR 1.04    CBC & Differential [[id number]]  (Abnormal) Collected: 07/05/25 0042    Specimen: Blood Updated: 07/05/25 0052    Narrative:      The following orders were created for panel order CBC & Differential.  Procedure                               Abnormality         Status                     ---------                               -----------         ------                     CBC Auto Differential[[id number]]        Abnormal            Final result                 Please view results for these tests on the individual orders.    CBC Auto Differential [[id number]]  (Abnormal) Collected: 07/05/25 0042    Specimen: Blood Updated: 07/05/25 0052     WBC 4.87 10*3/mm3      RBC 3.29 10*6/mm3      Hemoglobin 9.9 g/dL      Hematocrit 31.2 %      MCV 94.8  fL      MCH 30.1 pg      MCHC 31.7 g/dL      RDW 12.7 %      RDW-SD 44.1 fl      MPV 9.3 fL      Platelets 259 10*3/mm3      Neutrophil % 53.4 %      Lymphocyte % 31.2 %      Monocyte % 8.2 %      Eosinophil % 6.0 %      Basophil % 1.0 %      Immature Grans % 0.2 %      Neutrophils, Absolute 2.60 10*3/mm3      Lymphocytes, Absolute 1.52 10*3/mm3      Monocytes, Absolute 0.40 10*3/mm3      Eosinophils, Absolute 0.29 10*3/mm3      Basophils, Absolute 0.05 10*3/mm3      Immature Grans, Absolute 0.01 10*3/mm3      nRBC 0.0 /100 WBC     Ferritin [[id number]]  (Abnormal) Collected: 07/04/25 0446    Specimen: Blood Updated: 07/04/25 1216     Ferritin 16.10 ng/mL     Narrative:      Results may be falsely decreased if patient taking Biotin.      POC Glucose Once [[id number]]  (Normal) Collected: 07/04/25 1013    Specimen: Blood Updated: 07/04/25 1016     Glucose 86 mg/dL      Comment: Serial Number: [id number]:  [id number]       Reticulocytes [[id number]]  (Abnormal) Collected: 07/04/25 0446    Specimen: Blood Updated: 07/04/25 0905     Reticulocyte % 2.35 %      Reticulocyte Absolute 0.0757 10*6/mm3     Iron Profile w/o Ferritin [[id number]]  (Abnormal) Collected: 07/04/25 0446    Specimen: Blood Updated: 07/04/25 0903     Iron 18 mcg/dL      Iron Saturation (TSAT) 5 %      Transferrin 250 mg/dL      TIBC 373 mcg/dL     Basic Metabolic Panel [[id number]]  (Abnormal) Collected: 07/04/25 0446    Specimen: Blood Updated: 07/04/25 0530     Glucose 103 mg/dL      BUN 13.8 mg/dL      Creatinine 1.20 mg/dL      Sodium 142 mmol/L      Potassium 4.1 mmol/L      Chloride 107 mmol/L      CO2 24.9 mmol/L      Calcium 8.2 mg/dL      BUN/Creatinine Ratio 11.5     Anion Gap 10.1 mmol/L      eGFR 68.8 mL/min/1.73     Narrative:      GFR Categories in Chronic Kidney Disease (CKD)              GFR Category          GFR (mL/min/1.73)    Interpretation  G1                    90 or greater        Normal or high (1)  G2                     60-89                Mild decrease (1)  G3a                   45-59                Mild to moderate decrease  G3b                   30-44                Moderate to severe decrease  G4                    15-29                Severe decrease  G5                    14 or less           Kidney failure    (1)In the absence of evidence of kidney disease, neither GFR category G1 or G2 fulfill the criteria for CKD.    eGFR calculation 2021 CKD-EPI creatinine equation, which does not include race as a factor    CBC (No Diff) [[id number]]  (Abnormal) Collected: 07/04/25 0446    Specimen: Blood Updated: 07/04/25 0501     WBC 4.48 10*3/mm3      RBC 3.24 10*6/mm3      Hemoglobin 9.7 g/dL      Hematocrit 30.4 %      MCV 93.8 fL      MCH 29.9 pg      MCHC 31.9 g/dL      RDW 12.8 %      RDW-SD 44.3 fl      MPV 9.3 fL      Platelets 239 10*3/mm3     Comprehensive Metabolic Panel [[id number]]  (Abnormal) Collected: 07/03/25 1652    Specimen: Blood Updated: 07/03/25 1720     Glucose 112 mg/dL      BUN 16.4 mg/dL      Creatinine 1.34 mg/dL      Sodium 139 mmol/L      Potassium 4.0 mmol/L      Chloride 102 mmol/L      CO2 25.5 mmol/L      Calcium 9.5 mg/dL      Total Protein 7.2 g/dL      Albumin 4.8 g/dL      ALT (SGPT) 21 U/L      AST (SGOT) 29 U/L      Alkaline Phosphatase 92 U/L      Total Bilirubin 0.5 mg/dL      Globulin 2.4 gm/dL      A/G Ratio 2.0 g/dL      BUN/Creatinine Ratio 12.2     Anion Gap 11.5 mmol/L      eGFR 60.3 mL/min/1.73     Narrative:      GFR Categories in Chronic Kidney Disease (CKD)              GFR Category          GFR (mL/min/1.73)    Interpretation  G1                    90 or greater        Normal or high (1)  G2                    60-89                Mild decrease (1)  G3a                   45-59                Mild to moderate decrease  G3b                   30-44                Moderate to severe decrease  G4                    15-29                Severe decrease  G5                    14 or less            Kidney failure    (1)In the absence of evidence of kidney disease, neither GFR category G1 or G2 fulfill the criteria for CKD.    eGFR calculation 2021 CKD-EPI creatinine equation, which does not include race as a factor    Urinalysis With Culture If Indicated - Urine, Clean Catch [[id number]]  (Abnormal) Collected: 07/03/25 1653    Specimen: Urine, Clean Catch Updated: 07/03/25 1708     Color, UA Yellow     Appearance, UA Clear     pH, UA 6.0     Specific Gravity, UA 1.018     Glucose, UA Negative     Ketones, UA Trace     Bilirubin, UA Negative     Blood, UA Negative     Protein, UA Negative     Leuk Esterase, UA Trace     Nitrite, UA Negative     Urobilinogen, UA 1.0 E.U./dL    Narrative:      In absence of clinical symptoms, the presence of pyuria, bacteria, and/or nitrites on the urinalysis result does not correlate with infection.    Urinalysis, Microscopic Only - Urine, Clean Catch [[id number]] Collected: 07/03/25 1653    Specimen: Urine, Clean Catch Updated: 07/03/25 1708     RBC, UA 0-2 /HPF      WBC, UA 0-2 /HPF      Comment: Urine culture not indicated.        Bacteria, UA None Seen /HPF      Squamous Epithelial Cells, UA None Seen /HPF      Hyaline Casts, UA None Seen /LPF      Methodology Automated Microscopy    CBC & Differential [[id number]]  (Abnormal) Collected: 07/03/25 1652    Specimen: Blood Updated: 07/03/25 1702    Narrative:      The following orders were created for panel order CBC & Differential.  Procedure                               Abnormality         Status                     ---------                               -----------         ------                     CBC Auto Differential[[id number]]        Abnormal            Final result                 Please view results for these tests on the individual orders.    CBC Auto Differential [[id number]]  (Abnormal) Collected: 07/03/25 1652    Specimen: Blood Updated: 07/03/25 1702     WBC 5.97 10*3/mm3      RBC 3.51 10*6/mm3      Hemoglobin  10.7 g/dL      Hematocrit 32.9 %      MCV 93.7 fL      MCH 30.5 pg      MCHC 32.5 g/dL      RDW 12.8 %      RDW-SD 44.0 fl      MPV 9.0 fL      Platelets 282 10*3/mm3      Neutrophil % 58.5 %      Lymphocyte % 28.5 %      Monocyte % 7.4 %      Eosinophil % 4.2 %      Basophil % 1.2 %      Immature Grans % 0.2 %      Neutrophils, Absolute 3.50 10*3/mm3      Lymphocytes, Absolute 1.70 10*3/mm3      Monocytes, Absolute 0.44 10*3/mm3      Eosinophils, Absolute 0.25 10*3/mm3      Basophils, Absolute 0.07 10*3/mm3      Immature Grans, Absolute 0.01 10*3/mm3      nRBC 0.0 /100 WBC     Extra Tubes [[id number]] Collected: 07/03/25 1652    Specimen: Blood, Venous Line Updated: 07/03/25 1700    Narrative:      The following orders were created for panel order Extra Tubes.  Procedure                               Abnormality         Status                     ---------                               -----------         ------                     Gold Top - SST[[id number]]                                   Final result               Light Blue Top[[id number]]                                   Final result                 Please view results for these tests on the individual orders.    Gold Top - SST [[id number]] Collected: 07/03/25 1652    Specimen: Blood Updated: 07/03/25 1700     Extra Tube Hold for add-ons.     Comment: Auto resulted.       Light Blue Top [[id number]] Collected: 07/03/25 1652    Specimen: Blood Updated: 07/03/25 1700     Extra Tube Hold for add-ons.     Comment: Auto resulted                Results for orders placed during the hospital encounter of 05/19/25    Adult Transesophageal Echo (DARREN) W/ Cont if Necessary Per Protocol (Cardiology Department) 05/30/2025 12:53 PM    Interpretation Summary    Left ventricular systolic function is normal. Left ventricular ejection fraction appears to be 56 - 60%.    Left ventricular diastolic function was normal.    Saline test results are negative.         Condition on  Discharge:  Stable    Vital Signs  Temp:  [97.3 °F (36.3 °C)-97.8 °F (36.6 °C)] 97.3 °F (36.3 °C)  Heart Rate:  [56-78] 56  Resp:  [13-19] 16  BP: (101-133)/(63-78) 133/78      Physical Exam  Vitals reviewed.   Constitutional:       Appearance: He is not ill-appearing.   HENT:      Head: Normocephalic and atraumatic.      Right Ear: External ear normal.      Left Ear: External ear normal.      Nose: Nose normal.      Mouth/Throat:      Mouth: Mucous membranes are moist.   Eyes:      General:         Right eye: No discharge.         Left eye: No discharge.   Cardiovascular:      Rate and Rhythm: Normal rate and regular rhythm.      Pulses: Normal pulses.      Heart sounds: Normal heart sounds.   Pulmonary:      Effort: Pulmonary effort is normal.      Breath sounds: Normal breath sounds.   Abdominal:      General: Bowel sounds are normal.      Palpations: Abdomen is soft.   Musculoskeletal:         General: Normal range of motion.      Cervical back: Normal range of motion.   Skin:     General: Skin is warm and dry.   Neurological:      Mental Status: He is alert and oriented to person, place, and time.   Psychiatric:         Behavior: Behavior normal.              Discharge Disposition      Discharge Medications     Discharge Medications        ASK your doctor about these medications        Instructions Start Date   acetaminophen 325 MG tablet  Commonly known as: TYLENOL   650 mg, Every 6 Hours PRN      aspirin 81 MG EC tablet   81 mg, Oral, Daily      atorvastatin 80 MG tablet  Commonly known as: LIPITOR   80 mg, Oral, Nightly      desvenlafaxine 50 MG 24 hr tablet  Commonly known as: PRISTIQ   1 tablet, Daily      LORazepam 0.5 MG tablet  Commonly known as: ATIVAN   TAKE 1 OR 2 TABLETS BY MOUTH 2 TIMES A DAY AS NEEDED      omeprazole 40 MG capsule  Commonly known as: priLOSEC  Ask about: Which instructions should I use?   40 mg, Daily      rivaroxaban 20 MG tablet  Commonly known as: XARELTO  Ask about: Which  instructions should I use?   20 mg, Nightly               Discharge Diet:     Activity at Discharge:     Follow-up Appointments  Future Appointments   Date Time Provider Department Center   8/1/2025  1:15 PM Richard Moore DO [facility]   8/11/2025 11:00 AM Sidney Knott MD [facility]   9/4/2025  2:30 PM [facility]   9/11/2025  1:15 PM Gonzalo Kearney MD [facility] [place] [facility]   9/11/2025  1:20 PM [facility] ONLY ONC LAB [facility]         Test Results Pending at Discharge  Pending Results       None             Kim Tapia, STEVEN  07/05/25  12:46 EDT    Time: Discharge 25 min

## 2025-07-05 NOTE — PLAN OF CARE
Goal Outcome Evaluation:  Plan of Care Reviewed With: patient        Progress: improving  Outcome Evaluation: Pt stable and abed. Plan for gentle hydration over night, lovenox this evening, and repeat labs in AM. Call light within reach and able to make needs known.

## 2025-07-05 NOTE — ANESTHESIA PREPROCEDURE EVALUATION
Anesthesia Evaluation     Patient summary reviewed and Nursing notes reviewed   NPO Solid Status: > 8 hours  NPO Liquid Status: > 8 hours           Airway   Mallampati: II  TM distance: >3 FB  Neck ROM: full  No difficulty expected  Dental - normal exam     Pulmonary    Cardiovascular     (+) hypertension, hyperlipidemia      Neuro/Psych  GI/Hepatic/Renal/Endo      Musculoskeletal     Abdominal    Substance History      OB/GYN          Other   blood dyscrasia anemia,     ROS/Med Hx Other: 60-year-old male history of lupus anticoagulation on Xarelto presenting with weakness and melena.  He had an EGD and colonoscopy last in 2024.  On exam abdomen is soft nontender.  Labs and imaging reviewed, hemoglobin 9.7.  Melena, anemia-suspect upper GI bleeding, possibly peptic ulcer disease.  Recommend holding Xarelto today, giving IV iron today, and plan for EGD tomorrow.        5/25 heart cath  Impressions:  No angiographic evidence for significant epicardial occlusive disease  Normal left ventricular systolic function ejection fraction 60 to 65%     Recommendations:  Medical therapy and risk factor modification        ·  Left ventricular systolic function is normal. Left ventricular ejection fraction appears to be 56 - 60%.  ·  Left ventricular diastolic function was normal.  ·  Saline test results are negative.                Anesthesia Plan    ASA 3     MAC     intravenous induction     Anesthetic plan, risks, benefits, and alternatives have been provided, discussed and informed consent has been obtained with: patient.    Plan discussed with CRNA.    CODE STATUS:    Code Status (Patient has no pulse and is not breathing): CPR (Attempt to Resuscitate)  Medical Interventions (Patient has pulse or is breathing): Full Support

## 2025-07-05 NOTE — PLAN OF CARE
Goal Outcome Evaluation:            C/o generalized mild soreness, medicated with tylenol per request. NPO since midnight. Plan of care is ongoing.

## 2025-07-05 NOTE — ANESTHESIA POSTPROCEDURE EVALUATION
Patient: Flip Vang    Procedure Summary       Date: 07/05/25 Room / Location: [facility] ENDOSCOPY 1 / [facility] ENDOSCOPY    Anesthesia Start: 1040 Anesthesia Stop: 1056    Procedure: ESOPHAGOGASTRODUODENOSCOPY Diagnosis:       Melena      (Melena [K92.1])    Surgeons: YG Bettencourt MD Provider: Jeff Solares MD    Anesthesia Type: MAC ASA Status: 3            Anesthesia Type: MAC    Vitals  Vitals Value Taken Time   /72 07/05/25 11:31   Temp     Pulse 58 07/05/25 11:34   Resp 17 07/05/25 11:23   SpO2 99 % 07/05/25 11:34   Vitals shown include unfiled device data.        Post Anesthesia Care and Evaluation    Patient location during evaluation: PACU  Patient participation: complete - patient participated  Level of consciousness: awake  Pain scale: See nurse's notes for pain score.  Pain management: adequate    Airway patency: patent  Anesthetic complications: No anesthetic complications  PONV Status: none  Cardiovascular status: acceptable  Respiratory status: acceptable and spontaneous ventilation  Hydration status: acceptable    Comments: Patient seen and examined postoperatively; vital signs stable; SpO2 greater than or equal to 90%; cardiopulmonary status stable; nausea/vomiting adequately controlled; pain adequately controlled; no apparent anesthesia complications; patient discharged from anesthesia care when discharge criteria were met

## 2025-07-05 NOTE — PROGRESS NOTES
LOS: 0 days   Patient Care Team:  Aristeo Rodriguez MD as PCP - General (Family Medicine)  Aristeo Rodriguez MD as PCP - Family Medicine  Aristeo Rodriguez MD    Subjective     Patient denies any complaints    Review of Systems   Constitutional:  Positive for activity change.   HENT: Negative.     Respiratory: Negative.     Cardiovascular: Negative.    Gastrointestinal: Negative.    Genitourinary: Negative.    Musculoskeletal: Negative.    Neurological:  Positive for weakness.   Psychiatric/Behavioral: Negative.             Objective     Vital Signs  Temp:  [97.3 °F (36.3 °C)-97.8 °F (36.6 °C)] 97.3 °F (36.3 °C)  Heart Rate:  [56-78] 56  Resp:  [13-19] 16  BP: (101-133)/(63-78) 133/78      Physical Exam  Vitals reviewed.   Constitutional:       Appearance: He is not ill-appearing.   HENT:      Head: Normocephalic and atraumatic.      Right Ear: External ear normal.      Left Ear: External ear normal.      Nose: Nose normal.   Eyes:      General:         Right eye: No discharge.         Left eye: No discharge.   Cardiovascular:      Rate and Rhythm: Normal rate and regular rhythm.      Pulses: Normal pulses.      Heart sounds: Normal heart sounds.   Pulmonary:      Effort: Pulmonary effort is normal.      Breath sounds: Normal breath sounds.   Abdominal:      General: Bowel sounds are normal.      Palpations: Abdomen is soft.   Musculoskeletal:         General: Normal range of motion.      Cervical back: Normal range of motion.   Skin:     General: Skin is warm and dry.   Neurological:      Mental Status: He is alert and oriented to person, place, and time.   Psychiatric:         Behavior: Behavior normal.              Results Review:    Lab Results (last 24 hours)       Procedure Component Value Units Date/Time    Basic Metabolic Panel [[id number]]  (Abnormal) Collected: 07/05/25 0042    Specimen: Blood Updated: 07/05/25 0114     Glucose 122 mg/dL      BUN 13.5 mg/dL      Creatinine 1.45 mg/dL      Sodium 141 mmol/L       Potassium 4.1 mmol/L      Chloride 107 mmol/L      CO2 25.0 mmol/L      Calcium 8.4 mg/dL      BUN/Creatinine Ratio 9.3     Anion Gap 9.0 mmol/L      eGFR 54.8 mL/min/1.73     Narrative:      GFR Categories in Chronic Kidney Disease (CKD)              GFR Category          GFR (mL/min/1.73)    Interpretation  G1                    90 or greater        Normal or high (1)  G2                    60-89                Mild decrease (1)  G3a                   45-59                Mild to moderate decrease  G3b                   30-44                Moderate to severe decrease  G4                    15-29                Severe decrease  G5                    14 or less           Kidney failure    (1)In the absence of evidence of kidney disease, neither GFR category G1 or G2 fulfill the criteria for CKD.    eGFR calculation 2021 CKD-EPI creatinine equation, which does not include race as a factor    Protime-INR [[id number]]  (Normal) Collected: 07/05/25 0042    Specimen: Blood Updated: 07/05/25 0059     Protime 13.5 Seconds      INR 1.04    CBC & Differential [[id number]]  (Abnormal) Collected: 07/05/25 0042    Specimen: Blood Updated: 07/05/25 0052    Narrative:      The following orders were created for panel order CBC & Differential.  Procedure                               Abnormality         Status                     ---------                               -----------         ------                     CBC Auto Differential[[id number]]        Abnormal            Final result                 Please view results for these tests on the individual orders.    CBC Auto Differential [[id number]]  (Abnormal) Collected: 07/05/25 0042    Specimen: Blood Updated: 07/05/25 0052     WBC 4.87 10*3/mm3      RBC 3.29 10*6/mm3      Hemoglobin 9.9 g/dL      Hematocrit 31.2 %      MCV 94.8 fL      MCH 30.1 pg      MCHC 31.7 g/dL      RDW 12.7 %      RDW-SD 44.1 fl      MPV 9.3 fL      Platelets 259 10*3/mm3      Neutrophil % 53.4 %       Lymphocyte % 31.2 %      Monocyte % 8.2 %      Eosinophil % 6.0 %      Basophil % 1.0 %      Immature Grans % 0.2 %      Neutrophils, Absolute 2.60 10*3/mm3      Lymphocytes, Absolute 1.52 10*3/mm3      Monocytes, Absolute 0.40 10*3/mm3      Eosinophils, Absolute 0.29 10*3/mm3      Basophils, Absolute 0.05 10*3/mm3      Immature Grans, Absolute 0.01 10*3/mm3      nRBC 0.0 /100 WBC              Imaging Results (Last 24 Hours)       ** No results found for the last 24 hours. **                 I reviewed the patient's new clinical results.    Medication Review:   Scheduled Meds:[Held by provider] aspirin, 81 mg, Oral, Daily  atorvastatin, 80 mg, Oral, Nightly  desvenlafaxine, 50 mg, Oral, Daily  pantoprazole, 40 mg, Intravenous, BID AC  sodium chloride, 500 mL, Intravenous, Once  sodium chloride, 10 mL, Intravenous, Q12H      Continuous Infusions:Pharmacy to Dose enoxaparin (LOVENOX),   sodium chloride, 100 mL/hr      PRN Meds:.  acetaminophen **OR** acetaminophen **OR** acetaminophen    Calcium Replacement - Follow Nurse / BPA Driven Protocol    LORazepam    Magnesium Standard Dose Replacement - Follow Nurse / BPA Driven Protocol    nitroglycerin    ondansetron    Pharmacy to Dose enoxaparin (LOVENOX)    Phosphorus Replacement - Follow Nurse / BPA Driven Protocol    Potassium Replacement - Follow Nurse / BPA Driven Protocol    sodium chloride    sodium chloride     Interval History:    Assessment & Plan     Melena  Laryngopharyngeal reflux (LPR)  Essential hypertension  Retinal artery occlusion  Anxiety    Plan of care  Patient had EGD with minimal erythema in the stomach antrum. One area that appeared to be healed ulcer/erosion in the antrum. Otherwise no ulcers or erosions were seen. No blood in the stomach. Recommendation PPI avoid NSAID. Spoke with Dr Peralta, recommendation keep overnight check labs in the am and use lovenox for anticoagulation.     Plan for disposition:Home    Kim Tapia,  STEVEN  07/05/25  13:27 EDT

## 2025-07-05 NOTE — CONSULTS
Hematology/Oncology Inpatient Consultation    Patient name: Flip Vang  : 1965  MRN: [id number]  Referring Provider: Luis Bettencourt MD  Reason for Consultation:     clotting disorder/anticoagulation/GI bleed advice sees Dr Kearney       Chief complaint: Black stool     History of present illness:    Flip Vang is a 60 y.o. male medical history of hyperlipidemia, hypertension, anxiety, laryngeal pharyngeal reflux, retinal artery occlusion with visual deficit, who presented to [facility] on 7/3/2025 with complaints of black stool.  He is currently on anticoagulation with Xarelto.  Gastroenterology was consulted and he  underwent an EGD this admission which showed mild gastritis and normal EGD.  Future colonoscopy is being considered.  Iron profile during this admission showed an iron of 18 ferritin 16.10 iron saturation 5 transferrin 250 TIBC 373, current hemoglobin 9.9.    25  Hematology/Oncology was consulted .  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  He was negative for acute stroke.  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  Dr Kearney  was consulted for possible hypercoagulability. Anticardiolipin and antibeta 2 glycoprotein antibody testing was negative. Lupus anticoagulant was reported positive .  It was unclear whether the positive result was due to the timing of the sample.  APTT was within normal range.  He was placed on Xarelto 20 mg daily.  He was seen by  25 and was continued on anticoagulation for now and scheduled for follow-up in 3 months with additional laboratory tests.  He has not had a bowel movement since EGD. Denies abdominal pain.  Reviewed his records    He/She  has a past medical history of Laryngopharyngeal reflux (LPR).    PCP: Aristeo Rodriguez,  MD    History:  Past Medical History:   Diagnosis Date    Laryngopharyngeal reflux (LPR)    ,   Past Surgical History:   Procedure Laterality Date    CARDIAC CATHETERIZATION N/A 5/16/2025    Procedure: Left Heart Cath radial;  Surgeon: Richard Moore DO;  Location: [facility] CATH INVASIVE LOCATION;  Service: Cardiovascular;  Laterality: N/A;    COLONOSCOPY      COLONOSCOPY N/A 1/29/2024    Procedure: COLONOSCOPY WITH POLYPECTOMY X1;  Surgeon: Tre Fitzpatrick MD;  Location: [facility] ENDOSCOPY;  Service: Gastroenterology;  Laterality: N/A;  POST: POLYP    ENDOSCOPY N/A 1/29/2024    Procedure: ESOPHAGOGASTRODUODENOSCOPY WITH BIOPSY X1 AREA AND DILATION UP TO 50;  Surgeon: Tre Fitzpatrick MD;  Location: [facility] ENDOSCOPY;  Service: Gastroenterology;  Laterality: N/A;  POST: GASTRITIS, ESOPHAGEAL STRICTURE   ,   Family History   Problem Relation Age of Onset    Cancer Father    ,   Social History     Tobacco Use    Smoking status: Never     Passive exposure: Never    Smokeless tobacco: Never   Vaping Use    Vaping status: Never Used   Substance Use Topics    Alcohol use: Yes     Comment: Occasional    Drug use: Never   ,   Medications Prior to Admission   Medication Sig Dispense Refill Last Dose/Taking    acetaminophen (TYLENOL) 325 MG tablet Take 2 tablets by mouth Every 6 (Six) Hours As Needed for Mild Pain.   Taking As Needed    aspirin 81 MG EC tablet Take 1 tablet by mouth Daily. 30 tablet 5 7/3/2025 Morning    atorvastatin (LIPITOR) 80 MG tablet Take 1 tablet by mouth Every Night. 90 tablet 3 7/2/2025 Bedtime    desvenlafaxine (PRISTIQ) 50 MG 24 hr tablet Take 1 tablet by mouth Daily.   7/3/2025 Morning    LORazepam (ATIVAN) 0.5 MG tablet TAKE 1 OR 2 TABLETS BY MOUTH 2 TIMES A DAY AS NEEDED   Taking    omeprazole (priLOSEC) 40 MG capsule Take 1 capsule by mouth Daily.   7/3/2025 Morning    rivaroxaban (XARELTO) 20 MG tablet Take 1 tablet by mouth Every Night.   7/2/2025 Bedtime   , Scheduled  Meds:  [Held by provider] aspirin, 81 mg, Oral, Daily  atorvastatin, 80 mg, Oral, Nightly  desvenlafaxine, 50 mg, Oral, Daily  enoxaparin sodium, 1 mg/kg, Subcutaneous, Q12H  pantoprazole, 40 mg, Intravenous, BID AC  sodium chloride, 500 mL, Intravenous, Once  sodium chloride, 10 mL, Intravenous, Q12H    , Continuous Infusions:  Pharmacy to Dose enoxaparin (LOVENOX),   sodium chloride, 100 mL/hr, Last Rate: 100 mL/hr (07/05/25 1404)    , PRN Meds:    acetaminophen **OR** acetaminophen **OR** acetaminophen    Calcium Replacement - Follow Nurse / BPA Driven Protocol    LORazepam    Magnesium Standard Dose Replacement - Follow Nurse / BPA Driven Protocol    nitroglycerin    ondansetron    Pharmacy to Dose enoxaparin (LOVENOX)    Phosphorus Replacement - Follow Nurse / BPA Driven Protocol    Potassium Replacement - Follow Nurse / BPA Driven Protocol    sodium chloride    sodium chloride   Allergies:  Patient has no known allergies.    Subjective     ROS:  Review of Systems   Constitutional:  Negative for chills, fatigue and fever.   HENT:  Negative for congestion, drooling, ear discharge, rhinorrhea, sinus pressure and tinnitus.    Eyes:  Negative for photophobia, pain and discharge.   Respiratory:  Negative for apnea, choking and stridor.    Cardiovascular:  Negative for palpitations.   Gastrointestinal:  Negative for abdominal distention, abdominal pain and anal bleeding.   Endocrine: Negative for polydipsia and polyphagia.   Genitourinary:  Negative for decreased urine volume, flank pain and genital sores.   Musculoskeletal:  Negative for gait problem, neck pain and neck stiffness.   Skin:  Negative for color change, rash and wound.   Neurological:  Negative for tremors, seizures, syncope, facial asymmetry and speech difficulty.   Hematological:  Negative for adenopathy.   Psychiatric/Behavioral:  Negative for agitation, confusion, hallucinations and self-injury. The patient is not hyperactive.         Objective  "  Vital Signs:   /78 (BP Location: Left arm, Patient Position: Sitting)   Pulse 56   Temp 97.3 °F (36.3 °C) (Oral)   Resp 16   Ht 182.9 cm (72\")   Wt 76.8 kg (169 lb 5 oz)   SpO2 100%   BMI 22.96 kg/m²     Physical Exam: (performed by MD)  Physical Exam  Constitutional:       General: He is not in acute distress.     Appearance: He is not ill-appearing.   HENT:      Right Ear: External ear normal.      Left Ear: External ear normal.   Eyes:      Extraocular Movements: Extraocular movements intact.      Conjunctiva/sclera: Conjunctivae normal.      Pupils: Pupils are equal, round, and reactive to light.   Pulmonary:      Effort: Pulmonary effort is normal.   Neurological:      General: No focal deficit present.      Mental Status: He is oriented to person, place, and time. Mental status is at baseline.   Psychiatric:         Mood and Affect: Mood normal.         Behavior: Behavior normal.         Thought Content: Thought content normal.         Judgment: Judgment normal.         Results Review:  Lab Results (last 48 hours)       Procedure Component Value Units Date/Time    Basic Metabolic Panel [[id number]]  (Abnormal) Collected: 07/05/25 0042    Specimen: Blood Updated: 07/05/25 0114     Glucose 122 mg/dL      BUN 13.5 mg/dL      Creatinine 1.45 mg/dL      Sodium 141 mmol/L      Potassium 4.1 mmol/L      Chloride 107 mmol/L      CO2 25.0 mmol/L      Calcium 8.4 mg/dL      BUN/Creatinine Ratio 9.3     Anion Gap 9.0 mmol/L      eGFR 54.8 mL/min/1.73     Narrative:      GFR Categories in Chronic Kidney Disease (CKD)              GFR Category          GFR (mL/min/1.73)    Interpretation  G1                    90 or greater        Normal or high (1)  G2                    60-89                Mild decrease (1)  G3a                   45-59                Mild to moderate decrease  G3b                   30-44                Moderate to severe decrease  G4                    15-29                Severe " decrease  G5                    14 or less           Kidney failure    (1)In the absence of evidence of kidney disease, neither GFR category G1 or G2 fulfill the criteria for CKD.    eGFR calculation 2021 CKD-EPI creatinine equation, which does not include race as a factor    Protime-INR [[id number]]  (Normal) Collected: 07/05/25 0042    Specimen: Blood Updated: 07/05/25 0059     Protime 13.5 Seconds      INR 1.04    CBC & Differential [[id number]]  (Abnormal) Collected: 07/05/25 0042    Specimen: Blood Updated: 07/05/25 0052    Narrative:      The following orders were created for panel order CBC & Differential.  Procedure                               Abnormality         Status                     ---------                               -----------         ------                     CBC Auto Differential[[id number]]        Abnormal            Final result                 Please view results for these tests on the individual orders.    CBC Auto Differential [[id number]]  (Abnormal) Collected: 07/05/25 0042    Specimen: Blood Updated: 07/05/25 0052     WBC 4.87 10*3/mm3      RBC 3.29 10*6/mm3      Hemoglobin 9.9 g/dL      Hematocrit 31.2 %      MCV 94.8 fL      MCH 30.1 pg      MCHC 31.7 g/dL      RDW 12.7 %      RDW-SD 44.1 fl      MPV 9.3 fL      Platelets 259 10*3/mm3      Neutrophil % 53.4 %      Lymphocyte % 31.2 %      Monocyte % 8.2 %      Eosinophil % 6.0 %      Basophil % 1.0 %      Immature Grans % 0.2 %      Neutrophils, Absolute 2.60 10*3/mm3      Lymphocytes, Absolute 1.52 10*3/mm3      Monocytes, Absolute 0.40 10*3/mm3      Eosinophils, Absolute 0.29 10*3/mm3      Basophils, Absolute 0.05 10*3/mm3      Immature Grans, Absolute 0.01 10*3/mm3      nRBC 0.0 /100 WBC     Ferritin [[id number]]  (Abnormal) Collected: 07/04/25 0446    Specimen: Blood Updated: 07/04/25 1216     Ferritin 16.10 ng/mL     Narrative:      Results may be falsely decreased if patient taking Biotin.      POC Glucose Once [[id number]]   (Normal) Collected: 07/04/25 1013    Specimen: Blood Updated: 07/04/25 1016     Glucose 86 mg/dL      Comment: Serial Number: [id number]:  [id number]       Reticulocytes [[id number]]  (Abnormal) Collected: 07/04/25 0446    Specimen: Blood Updated: 07/04/25 0905     Reticulocyte % 2.35 %      Reticulocyte Absolute 0.0757 10*6/mm3     Iron Profile w/o Ferritin [[id number]]  (Abnormal) Collected: 07/04/25 0446    Specimen: Blood Updated: 07/04/25 0903     Iron 18 mcg/dL      Iron Saturation (TSAT) 5 %      Transferrin 250 mg/dL      TIBC 373 mcg/dL     Basic Metabolic Panel [[id number]]  (Abnormal) Collected: 07/04/25 0446    Specimen: Blood Updated: 07/04/25 0530     Glucose 103 mg/dL      BUN 13.8 mg/dL      Creatinine 1.20 mg/dL      Sodium 142 mmol/L      Potassium 4.1 mmol/L      Chloride 107 mmol/L      CO2 24.9 mmol/L      Calcium 8.2 mg/dL      BUN/Creatinine Ratio 11.5     Anion Gap 10.1 mmol/L      eGFR 68.8 mL/min/1.73     Narrative:      GFR Categories in Chronic Kidney Disease (CKD)              GFR Category          GFR (mL/min/1.73)    Interpretation  G1                    90 or greater        Normal or high (1)  G2                    60-89                Mild decrease (1)  G3a                   45-59                Mild to moderate decrease  G3b                   30-44                Moderate to severe decrease  G4                    15-29                Severe decrease  G5                    14 or less           Kidney failure    (1)In the absence of evidence of kidney disease, neither GFR category G1 or G2 fulfill the criteria for CKD.    eGFR calculation 2021 CKD-EPI creatinine equation, which does not include race as a factor    CBC (No Diff) [[id number]]  (Abnormal) Collected: 07/04/25 0446    Specimen: Blood Updated: 07/04/25 0501     WBC 4.48 10*3/mm3      RBC 3.24 10*6/mm3      Hemoglobin 9.7 g/dL      Hematocrit 30.4 %      MCV 93.8 fL      MCH 29.9 pg      MCHC 31.9 g/dL      RDW 12.8 %       RDW-SD 44.3 fl      MPV 9.3 fL      Platelets 239 10*3/mm3     Comprehensive Metabolic Panel [[id number]]  (Abnormal) Collected: 07/03/25 1652    Specimen: Blood Updated: 07/03/25 1720     Glucose 112 mg/dL      BUN 16.4 mg/dL      Creatinine 1.34 mg/dL      Sodium 139 mmol/L      Potassium 4.0 mmol/L      Chloride 102 mmol/L      CO2 25.5 mmol/L      Calcium 9.5 mg/dL      Total Protein 7.2 g/dL      Albumin 4.8 g/dL      ALT (SGPT) 21 U/L      AST (SGOT) 29 U/L      Alkaline Phosphatase 92 U/L      Total Bilirubin 0.5 mg/dL      Globulin 2.4 gm/dL      A/G Ratio 2.0 g/dL      BUN/Creatinine Ratio 12.2     Anion Gap 11.5 mmol/L      eGFR 60.3 mL/min/1.73     Narrative:      GFR Categories in Chronic Kidney Disease (CKD)              GFR Category          GFR (mL/min/1.73)    Interpretation  G1                    90 or greater        Normal or high (1)  G2                    60-89                Mild decrease (1)  G3a                   45-59                Mild to moderate decrease  G3b                   30-44                Moderate to severe decrease  G4                    15-29                Severe decrease  G5                    14 or less           Kidney failure    (1)In the absence of evidence of kidney disease, neither GFR category G1 or G2 fulfill the criteria for CKD.    eGFR calculation 2021 CKD-EPI creatinine equation, which does not include race as a factor    Urinalysis With Culture If Indicated - Urine, Clean Catch [[id number]]  (Abnormal) Collected: 07/03/25 1653    Specimen: Urine, Clean Catch Updated: 07/03/25 1708     Color, UA Yellow     Appearance, UA Clear     pH, UA 6.0     Specific Gravity, UA 1.018     Glucose, UA Negative     Ketones, UA Trace     Bilirubin, UA Negative     Blood, UA Negative     Protein, UA Negative     Leuk Esterase, UA Trace     Nitrite, UA Negative     Urobilinogen, UA 1.0 E.U./dL    Narrative:      In absence of clinical symptoms, the presence of pyuria, bacteria,  and/or nitrites on the urinalysis result does not correlate with infection.    Urinalysis, Microscopic Only - Urine, Clean Catch [[id number]] Collected: 07/03/25 1653    Specimen: Urine, Clean Catch Updated: 07/03/25 1708     RBC, UA 0-2 /HPF      WBC, UA 0-2 /HPF      Comment: Urine culture not indicated.        Bacteria, UA None Seen /HPF      Squamous Epithelial Cells, UA None Seen /HPF      Hyaline Casts, UA None Seen /LPF      Methodology Automated Microscopy    CBC & Differential [[id number]]  (Abnormal) Collected: 07/03/25 1652    Specimen: Blood Updated: 07/03/25 1702    Narrative:      The following orders were created for panel order CBC & Differential.  Procedure                               Abnormality         Status                     ---------                               -----------         ------                     CBC Auto Differential[[id number]]        Abnormal            Final result                 Please view results for these tests on the individual orders.    CBC Auto Differential [[id number]]  (Abnormal) Collected: 07/03/25 1652    Specimen: Blood Updated: 07/03/25 1702     WBC 5.97 10*3/mm3      RBC 3.51 10*6/mm3      Hemoglobin 10.7 g/dL      Hematocrit 32.9 %      MCV 93.7 fL      MCH 30.5 pg      MCHC 32.5 g/dL      RDW 12.8 %      RDW-SD 44.0 fl      MPV 9.0 fL      Platelets 282 10*3/mm3      Neutrophil % 58.5 %      Lymphocyte % 28.5 %      Monocyte % 7.4 %      Eosinophil % 4.2 %      Basophil % 1.2 %      Immature Grans % 0.2 %      Neutrophils, Absolute 3.50 10*3/mm3      Lymphocytes, Absolute 1.70 10*3/mm3      Monocytes, Absolute 0.44 10*3/mm3      Eosinophils, Absolute 0.25 10*3/mm3      Basophils, Absolute 0.07 10*3/mm3      Immature Grans, Absolute 0.01 10*3/mm3      nRBC 0.0 /100 WBC     Extra Tubes [[id number]] Collected: 07/03/25 1652    Specimen: Blood, Venous Line Updated: 07/03/25 1700    Narrative:      The following orders were created for panel order Extra  Tubes.  Procedure                               Abnormality         Status                     ---------                               -----------         ------                     Gold Top - SST[[id number]]                                   Final result               Light Blue Top[[id number]]                                   Final result                 Please view results for these tests on the individual orders.    Gold Top - SST [[id number]] Collected: 07/03/25 1652    Specimen: Blood Updated: 07/03/25 1700     Extra Tube Hold for add-ons.     Comment: Auto resulted.       Light Blue Top [[id number]] Collected: 07/03/25 1652    Specimen: Blood Updated: 07/03/25 1700     Extra Tube Hold for add-ons.     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      PLAN  As above     Electronically signed by STEVEN Hernandez, 07/05/25, 11:25 AM EDT.    Thank you for this consult. We will be happy to follow along with you.     Above note was prepared for Dr. Renetta Peralta -physical exam and review of systems were performed by physician.     Hematology/Oncology was consulted .  He is an established patient in  our office with  who saw him initially in May 2025 after he underwent a cardiac catheterization and subsequently developed Retinal artery occlusion in his left eye.  He was negative for acute stroke.  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  Dr Kearney  was consulted for possible hypercoagulability. Anticardiolipin and antibeta 2 glycoprotein antibody testing was negative. Lupus anticoagulant was reported positive .  It was unclear whether the positive result was due to the timing of the sample.  APTT was within normal range.  He was placed on Xarelto 20 mg daily.  He was seen by  6/19/25 and was continued on anticoagulation for now and scheduled for follow-up in 3 months with additional laboratory tests.  He has not had a bowel movement since EGD. Denies abdominal pain.  Reviewed his records    His physical exam is as documented above  Previous labs, imaging studies progress Notes  Patient has received 1 dose of IV iron by GI  He had colonoscopy approximately 1 and half years ago that was not abnormal.    Reviewed his EGD findings which showed minimal gastritis otherwise unremarkable  Continue to monitor his CBCs.    For now we will use Lovenox for anticoagulation continues hemoglobin stabilizes.      Electronically signed by Xena Peralta MD, 07/06/25, 2:31 PM EDT.

## 2025-07-06 ENCOUNTER — ON CAMPUS - OUTPATIENT (AMBULATORY)
Dept: URBAN - METROPOLITAN AREA HOSPITAL 85 | Facility: HOSPITAL | Age: 60
End: 2025-07-06
Payer: COMMERCIAL

## 2025-07-06 DIAGNOSIS — K92.1 MELENA: ICD-10-CM

## 2025-07-06 DIAGNOSIS — D50.9 IRON DEFICIENCY ANEMIA, UNSPECIFIED: ICD-10-CM

## 2025-07-06 LAB
ANION GAP SERPL CALCULATED.3IONS-SCNC: 8.9 MMOL/L (ref 5–15)
BASOPHILS # BLD AUTO: 0.06 10*3/MM3 (ref 0–0.2)
BASOPHILS NFR BLD AUTO: 1.5 % (ref 0–1.5)
BUN SERPL-MCNC: 9.9 MG/DL (ref 8–23)
BUN/CREAT SERPL: 9.5 (ref 7–25)
CALCIUM SPEC-SCNC: 7.3 MG/DL (ref 8.6–10.5)
CHLORIDE SERPL-SCNC: 111 MMOL/L (ref 98–107)
CO2 SERPL-SCNC: 21.1 MMOL/L (ref 22–29)
CREAT SERPL-MCNC: 1.04 MG/DL (ref 0.76–1.27)
DEPRECATED RDW RBC AUTO: 44.2 FL (ref 37–54)
EGFRCR SERPLBLD CKD-EPI 2021: 81.7 ML/MIN/1.73
EOSINOPHIL # BLD AUTO: 0.28 10*3/MM3 (ref 0–0.4)
EOSINOPHIL NFR BLD AUTO: 7 % (ref 0.3–6.2)
ERYTHROCYTE [DISTWIDTH] IN BLOOD BY AUTOMATED COUNT: 12.8 % (ref 12.3–15.4)
GLUCOSE SERPL-MCNC: 91 MG/DL (ref 65–99)
HCT VFR BLD AUTO: 27.8 % (ref 37.5–51)
HGB BLD-MCNC: 8.9 G/DL (ref 13–17.7)
IMM GRANULOCYTES # BLD AUTO: 0 10*3/MM3 (ref 0–0.05)
IMM GRANULOCYTES NFR BLD AUTO: 0 % (ref 0–0.5)
LYMPHOCYTES # BLD AUTO: 1.2 10*3/MM3 (ref 0.7–3.1)
LYMPHOCYTES NFR BLD AUTO: 30.2 % (ref 19.6–45.3)
MCH RBC QN AUTO: 30.4 PG (ref 26.6–33)
MCHC RBC AUTO-ENTMCNC: 32 G/DL (ref 31.5–35.7)
MCV RBC AUTO: 94.9 FL (ref 79–97)
MONOCYTES # BLD AUTO: 0.35 10*3/MM3 (ref 0.1–0.9)
MONOCYTES NFR BLD AUTO: 8.8 % (ref 5–12)
NEUTROPHILS NFR BLD AUTO: 2.09 10*3/MM3 (ref 1.7–7)
NEUTROPHILS NFR BLD AUTO: 52.5 % (ref 42.7–76)
NRBC BLD AUTO-RTO: 0 /100 WBC (ref 0–0.2)
PLATELET # BLD AUTO: 227 10*3/MM3 (ref 140–450)
PMV BLD AUTO: 9.6 FL (ref 6–12)
POTASSIUM SERPL-SCNC: 3.5 MMOL/L (ref 3.5–5.2)
RBC # BLD AUTO: 2.93 10*6/MM3 (ref 4.14–5.8)
SODIUM SERPL-SCNC: 141 MMOL/L (ref 136–145)
WBC NRBC COR # BLD AUTO: 3.98 10*3/MM3 (ref 3.4–10.8)

## 2025-07-06 PROCEDURE — 80048 BASIC METABOLIC PNL TOTAL CA: CPT | Performed by: NURSE PRACTITIONER

## 2025-07-06 PROCEDURE — 85025 COMPLETE CBC W/AUTO DIFF WBC: CPT | Performed by: NURSE PRACTITIONER

## 2025-07-06 PROCEDURE — 99232 SBSQ HOSP IP/OBS MODERATE 35: CPT | Performed by: INTERNAL MEDICINE

## 2025-07-06 PROCEDURE — 25810000003 SODIUM CHLORIDE 0.9 % SOLUTION: Performed by: NURSE PRACTITIONER

## 2025-07-06 PROCEDURE — 99232 SBSQ HOSP IP/OBS MODERATE 35: CPT | Performed by: NURSE PRACTITIONER

## 2025-07-06 PROCEDURE — 25010000002 ENOXAPARIN PER 10 MG: Performed by: NURSE PRACTITIONER

## 2025-07-06 PROCEDURE — G0378 HOSPITAL OBSERVATION PER HR: HCPCS

## 2025-07-06 RX ORDER — POTASSIUM CHLORIDE 1500 MG/1
40 TABLET, EXTENDED RELEASE ORAL EVERY 4 HOURS
Status: DISPENSED | OUTPATIENT
Start: 2025-07-06 | End: 2025-07-06

## 2025-07-06 RX ADMIN — ATORVASTATIN CALCIUM 80 MG: 40 TABLET, FILM COATED ORAL at 20:51

## 2025-07-06 RX ADMIN — POTASSIUM CHLORIDE 40 MEQ: 1500 TABLET, EXTENDED RELEASE ORAL at 09:27

## 2025-07-06 RX ADMIN — LORAZEPAM 0.5 MG: 0.5 TABLET ORAL at 20:51

## 2025-07-06 RX ADMIN — PANTOPRAZOLE SODIUM 40 MG: 40 INJECTION, POWDER, FOR SOLUTION INTRAVENOUS at 17:19

## 2025-07-06 RX ADMIN — DESVENLAFAXINE 50 MG: 50 TABLET, EXTENDED RELEASE ORAL at 09:27

## 2025-07-06 RX ADMIN — SODIUM CHLORIDE 100 ML/HR: 9 INJECTION, SOLUTION INTRAVENOUS at 00:11

## 2025-07-06 RX ADMIN — PANTOPRAZOLE SODIUM 40 MG: 40 INJECTION, POWDER, FOR SOLUTION INTRAVENOUS at 09:27

## 2025-07-06 RX ADMIN — ENOXAPARIN SODIUM 80 MG: 100 INJECTION SUBCUTANEOUS at 06:24

## 2025-07-06 RX ADMIN — Medication 10 ML: at 20:51

## 2025-07-06 RX ADMIN — Medication 10 ML: at 09:27

## 2025-07-06 RX ADMIN — ENOXAPARIN SODIUM 80 MG: 100 INJECTION SUBCUTANEOUS at 17:19

## 2025-07-06 NOTE — PROGRESS NOTES
Hematology/Oncology Inpatient Progress Note    PATIENT NAME: Flip Vang  : 1965  MRN: [id number]    CHIEF COMPLAINT: Black stools    HISTORY OF PRESENT ILLNESS:      Flip Vang is a 60 y.o. male medical history of hyperlipidemia, hypertension, anxiety, laryngeal pharyngeal reflux, retinal artery occlusion with visual deficit, who presented to [facility] on 7/3/2025 with complaints of black stool.  He is currently on anticoagulation with Xarelto.  Gastroenterology was consulted and he  underwent an EGD this admission which showed mild gastritis and normal EGD.  Future colonoscopy is being considered.  Iron profile during this admission showed an iron of 18 ferritin 16.10 iron saturation 5 transferrin 250 TIBC 373, current hemoglobin 9.9.     25  Hematology/Oncology was consulted .  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  He was negative for acute stroke.  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  Dr Kearney  was consulted for possible hypercoagulability. Anticardiolipin and antibeta 2 glycoprotein antibody testing was negative. Lupus anticoagulant was reported positive .  It was unclear whether the positive result was due to the timing of the sample.  APTT was within normal range.  He was placed on Xarelto 20 mg daily.  He was seen by  25 and was continued on anticoagulation for now and scheduled for follow-up in 3 months with additional laboratory tests.  He has not had a bowel movement since EGD. Denies abdominal pain.  Reviewed his records     He/She  has a past medical history of Laryngopharyngeal reflux (LPR).     PCP: Aristeo Rodriguez MD  Subjective     Patient had brown stool past 24 hours. No abdominal pain    ROS:  Review of Systems   Constitutional:  Negative for  chills, fatigue and fever.   HENT:  Negative for congestion, drooling, ear discharge, rhinorrhea, sinus pressure and tinnitus.    Eyes:  Negative for photophobia, pain and discharge.   Respiratory:  Negative for apnea, choking and stridor.    Cardiovascular:  Negative for palpitations.   Gastrointestinal:  Negative for abdominal distention, abdominal pain and anal bleeding.   Endocrine: Negative for polydipsia and polyphagia.   Genitourinary:  Negative for decreased urine volume, flank pain and genital sores.   Musculoskeletal:  Negative for gait problem, neck pain and neck stiffness.   Skin:  Negative for color change, rash and wound.   Neurological:  Negative for tremors, seizures, syncope, facial asymmetry and speech difficulty.   Hematological:  Negative for adenopathy.   Psychiatric/Behavioral:  Negative for agitation, confusion, hallucinations and self-injury. The patient is not hyperactive.         MEDICATIONS:    Scheduled Meds:  [Held by provider] aspirin, 81 mg, Oral, Daily  atorvastatin, 80 mg, Oral, Nightly  desvenlafaxine, 50 mg, Oral, Daily  enoxaparin sodium, 1 mg/kg, Subcutaneous, Q12H  pantoprazole, 40 mg, Intravenous, BID AC  potassium chloride ER, 40 mEq, Oral, Q4H  sodium chloride, 500 mL, Intravenous, Once  sodium chloride, 10 mL, Intravenous, Q12H       Continuous Infusions:  Pharmacy to Dose enoxaparin (LOVENOX),        PRN Meds:    acetaminophen **OR** acetaminophen **OR** acetaminophen    Calcium Replacement - Follow Nurse / BPA Driven Protocol    LORazepam    Magnesium Standard Dose Replacement - Follow Nurse / BPA Driven Protocol    nitroglycerin    ondansetron    Pharmacy to Dose enoxaparin (LOVENOX)    Phosphorus Replacement - Follow Nurse / BPA Driven Protocol    Potassium Replacement - Follow Nurse / BPA Driven Protocol    sodium chloride    sodium chloride     ALLERGIES:  No Known Allergies    Objective    VITALS:   /71 (BP Location: Left arm, Patient Position: Sitting)   Pulse  "75   Temp 98.3 °F (36.8 °C) (Oral)   Resp 16   Ht 182.9 cm (72\")   Wt 76.8 kg (169 lb 5 oz)   SpO2 95%   BMI 22.96 kg/m²     PHYSICAL EXAM: (performed by MD)  Physical Exam  Constitutional:       General: He is not in acute distress.     Appearance: He is not ill-appearing.   HENT:      Right Ear: External ear normal.      Left Ear: External ear normal.   Eyes:      Extraocular Movements: Extraocular movements intact.      Conjunctiva/sclera: Conjunctivae normal.      Pupils: Pupils are equal, round, and reactive to light.   Pulmonary:      Effort: Pulmonary effort is normal.   Neurological:      General: No focal deficit present.      Mental Status: Mental status is at baseline.   Psychiatric:         Mood and Affect: Mood normal.         Behavior: Behavior normal.         Thought Content: Thought content normal.         Judgment: Judgment normal.           RECENT LABS:  Lab Results (last 24 hours)       Procedure Component Value Units Date/Time    Basic Metabolic Panel [[id number]]  (Abnormal) Collected: 07/06/25 0526    Specimen: Blood Updated: 07/06/25 0606     Glucose 91 mg/dL      BUN 9.9 mg/dL      Creatinine 1.04 mg/dL      Sodium 141 mmol/L      Potassium 3.5 mmol/L      Comment: Specimen hemolyzed.  Result may be falsely elevated.        Chloride 111 mmol/L      CO2 21.1 mmol/L      Calcium 7.3 mg/dL      BUN/Creatinine Ratio 9.5     Anion Gap 8.9 mmol/L      eGFR 81.7 mL/min/1.73     Narrative:      GFR Categories in Chronic Kidney Disease (CKD)              GFR Category          GFR (mL/min/1.73)    Interpretation  G1                    90 or greater        Normal or high (1)  G2                    60-89                Mild decrease (1)  G3a                   45-59                Mild to moderate decrease  G3b                   30-44                Moderate to severe decrease  G4                    15-29                Severe decrease  G5                    14 or less           Kidney " failure    (1)In the absence of evidence of kidney disease, neither GFR category G1 or G2 fulfill the criteria for CKD.    eGFR calculation 2021 CKD-EPI creatinine equation, which does not include race as a factor    CBC & Differential [[id number]]  (Abnormal) Collected: 07/06/25 0526    Specimen: Blood Updated: 07/06/25 0540    Narrative:      The following orders were created for panel order CBC & Differential.  Procedure                               Abnormality         Status                     ---------                               -----------         ------                     CBC Auto Differential[[id number]]        Abnormal            Final result                 Please view results for these tests on the individual orders.    CBC Auto Differential [[id number]]  (Abnormal) Collected: 07/06/25 0526    Specimen: Blood Updated: 07/06/25 0540     WBC 3.98 10*3/mm3      RBC 2.93 10*6/mm3      Hemoglobin 8.9 g/dL      Hematocrit 27.8 %      MCV 94.9 fL      MCH 30.4 pg      MCHC 32.0 g/dL      RDW 12.8 %      RDW-SD 44.2 fl      MPV 9.6 fL      Platelets 227 10*3/mm3      Neutrophil % 52.5 %      Lymphocyte % 30.2 %      Monocyte % 8.8 %      Eosinophil % 7.0 %      Basophil % 1.5 %      Immature Grans % 0.0 %      Neutrophils, Absolute 2.09 10*3/mm3      Lymphocytes, Absolute 1.20 10*3/mm3      Monocytes, Absolute 0.35 10*3/mm3      Eosinophils, Absolute 0.28 10*3/mm3      Basophils, Absolute 0.06 10*3/mm3      Immature Grans, Absolute 0.00 10*3/mm3      nRBC 0.0 /100 WBC             PENDING RESULTS:     IMAGING REVIEWED:  No radiology results for the last day    Assessment & Plan     ASSESSMENT:    History of retinal artery occlusion, 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 Hemoglobin 8.9g/dl. He now has brown stools    PLAN:  Continue Lovenox  Monitor CBC next 1 to 4 hours  Advanced GI to see him to discuss if definitely colonoscopy is needed  Patient has received 1 dose of IV iron by GI  He had colonoscopy approximately 1 and half years ago that was not abnormal.  CBC in the morning  Reviewed his EGD findings which showed minimal gastritis otherwise unremarkable  Continue to monitor his CBCs.    For now we will use Lovenox for anticoagulation continues hemoglobin stabilizes.    All questions answered   Discussed with primary        Electronically signed by Xena Peralta MD, 07/06/25, 2:35 PM EDT.

## 2025-07-06 NOTE — PROGRESS NOTES
LOS: 0 days   Patient Care Team:  Aristeo Rodriguez MD as PCP - General (Family Medicine)  Aristeo Rodriguez MD as PCP - Family Medicine  Aristeo Rodriguez MD      Subjective     Interval History:   Asked to see again as patient/daughter are asking for repeat colonoscopy.   Patient just had high quality colonoscopy 18 months ago by Dr Fitzpatrick.   Patient had told Dr Bettencourt yesterday that he was seeing [facility] as an outpatient as we do not accept his insurance as an outpatient.   Spoke with wife, daughter & patient at length.   LABS: Sodium and potassium are normal.  Creatinine 1.04.  WBCs 3.98, hemoglobin 8.9 from 9.9, platelets 227.  Patient did receive 2 L fluid bolus yesterday.   He states his stool is more brown in color today.  No abdominal pain, nausea, vomiting.  Tolerating diet.    ROS:   No chest pain, shortness of breath, or cough.         Medication Review:     Current Facility-Administered Medications:     acetaminophen (TYLENOL) tablet 650 mg, 650 mg, Oral, Q4H PRN, 650 mg at 07/05/25 0021 **OR** acetaminophen (TYLENOL) 160 MG/5ML oral solution 650 mg, 650 mg, Oral, Q4H PRN **OR** acetaminophen (TYLENOL) suppository 650 mg, 650 mg, Rectal, Q4H PRN, YG Bettencourt MD    [Held by provider] aspirin EC tablet 81 mg, 81 mg, Oral, Daily, Cierra Cueva MD    atorvastatin (LIPITOR) tablet 80 mg, 80 mg, Oral, Nightly, Kim Tapia, APRN, 80 mg at 07/05/25 2036    Calcium Replacement - Follow Nurse / BPA Driven Protocol, , Not Applicable, PRN, YG Bettencourt MD    desvenlafaxine (PRISTIQ) 24 hr tablet 50 mg, 50 mg, Oral, Daily, YG Bettencourt MD, 50 mg at 07/06/25 0927    enoxaparin sodium (LOVENOX) syringe 80 mg, 1 mg/kg, Subcutaneous, Q12H, Kim Tapia, APRN, 80 mg at 07/06/25 0624    LORazepam (ATIVAN) tablet 0.5 mg, 0.5 mg, Oral, Q6H PRN, YG Bettencourt MD, 0.5 mg at 07/05/25 2150    Magnesium Standard Dose Replacement - Follow Nurse / BPA Driven Protocol, , Not Applicable, PRN, YG Bettencourt MD     nitroglycerin (NITROSTAT) SL tablet 0.4 mg, 0.4 mg, Sublingual, Q5 Min PRN, YG Bettencourt MD    ondansetron (ZOFRAN) injection 4 mg, 4 mg, Intravenous, Q6H PRN, YG Bettencourt MD    pantoprazole (PROTONIX) injection 40 mg, 40 mg, Intravenous, BID AC, YG Bettencourt MD, 40 mg at 07/06/25 0927    Pharmacy to Dose enoxaparin (LOVENOX), , Not Applicable, Continuous PRN, Kim Tapia APRAYLEEN    Phosphorus Replacement - Follow Nurse / BPA Driven Protocol, , Not Applicable, PRN, YG Bettencourt MD    Potassium Replacement - Follow Nurse / BPA Driven Protocol, , Not Applicable, PRN, YG Bettencourt MD    sodium chloride 0.9 % bolus 500 mL, 500 mL, Intravenous, Once, Kim Tapia APRN, Held at 07/05/25 1405    sodium chloride 0.9 % flush 10 mL, 10 mL, Intravenous, Q12H, YG Bettencourt MD, 10 mL at 07/06/25 0927    sodium chloride 0.9 % flush 10 mL, 10 mL, Intravenous, PRN, YG Bettencourt MD    sodium chloride 0.9 % infusion 40 mL, 40 mL, Intravenous, PRN, YG Bettencourt MD      Objective resting comfortably in hospital bed.  NAD.  Wife at bedside.  Daughter Meliza on the speaker phone.  Room 4128.    Vital Signs  Temp:  [97.4 °F (36.3 °C)-98.3 °F (36.8 °C)] 98.3 °F (36.8 °C)  Heart Rate:  [66-97] 75  Resp:  [12-18] 16  BP: (113-131)/(60-73) 125/71    Physical Exam:  General Appearance:    Awake and alert, in no acute distress   Head:    Normocephalic, without obvious abnormality   Eyes:          Conjunctivae normal, anicteric sclerae   Ears:    Hearing intact   Throat:   No oral lesions, no thrush, oral mucosa moist   Neck:   No adenopathy, supple, no JVD   Lungs:     respirations regular, even and unlabored       Abdomen:    soft, non-tender, no rebound or guarding, non-distended, no hepatosplenomegaly   Rectal:     Deferred   Extremities:   No edema, no cyanosis, no redness   Skin:   No bleeding, bruising or rash, no jaundice   Neurologic:   Cranial nerves 2 - 12 grossly intact, no asterixis,  sensation   intact        Results Review:    CBC    Results from last 7 days   Lab Units 07/06/25  0526 07/05/25  0042 07/04/25 0446 07/03/25  1652   WBC 10*3/mm3 3.98 4.87 4.48 5.97   HEMOGLOBIN g/dL 8.9* 9.9* 9.7* 10.7*   PLATELETS 10*3/mm3 227 259 239 282     CMP   Results from last 7 days   Lab Units 07/06/25  0526 07/05/25  0042 07/04/25 0446 07/03/25  1652   SODIUM mmol/L 141 141 142 139   POTASSIUM mmol/L 3.5 4.1 4.1 4.0   CHLORIDE mmol/L 111* 107 107 102   CO2 mmol/L 21.1* 25.0 24.9 25.5   BUN mg/dL 9.9 13.5 13.8 16.4   CREATININE mg/dL 1.04 1.45* 1.20 1.34*   GLUCOSE mg/dL 91 122* 103* 112*   ALBUMIN g/dL  --   --   --  4.8   BILIRUBIN mg/dL  --   --   --  0.5   ALK PHOS U/L  --   --   --  92   AST (SGOT) U/L  --   --   --  29   ALT (SGPT) U/L  --   --   --  21     Cr Clearance Estimated Creatinine Clearance: 81 mL/min (by C-G formula based on SCr of 1.04 mg/dL).  Coag   Results from last 7 days   Lab Units 07/05/25  0042   INR  1.04     HbA1C   Lab Results   Component Value Date    HGBA1C 5.37 05/19/2025     Blood Glucose   Glucose   Date/Time Value Ref Range Status   07/04/2025 1013 86 70 - 105 mg/dL Final     Comment:     Serial Number: [id number]:  [id number]     Infection     UA    Results from last 7 days   Lab Units 07/03/25  1653   NITRITE UA  Negative   WBC UA /HPF 0-2   BACTERIA UA /HPF None Seen   SQUAM EPITHEL UA /HPF None Seen     Radiology(recent) No radiology results for the last day          Assessment & Plan   60 y.o. male with PMH +Lupus antioag on xarelto (followed by Dr. 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 He presented to the ER due to melena at the recommendation of his vascular surgeon.  GI was consulted for melena.  He was recently started on Xarelto approximately 1 month ago.     Problem List:  Melena  Iron deficiency anemia  + Lupus anticoagulant on Xarelto, being held    7/5/25 EGD (Dr Bettencourt) normal erythema in the  stomach antrum.  1 area that appeared to be healed ulcer/erosion in the antrum.  No blood in the stomach.  Normal duodenum.  Normal esophagus.    Plan:  Hemoglobin dropped from 9.8-8.8 today after fluid bolus yesterday.  Patient has been restarted on Lovenox.  Asked to reconsult today as daughter and patient was requesting colonoscopy.  Long discussion with patient, wife, daughter regarding colonoscopy being low yield.  Patient was concerned about rectal bleeding/lower GI bleeding.  Discussed that generally bright red blood is generally hemorrhoidal, diverticular, AVM and if they are not actively bleeding during a colonoscopy they are not seen.  Discussed that generally after stopping anticoagulation and doing bowel purge nothing is found in the colon actively bleeding.  Did offer to do colonoscopy while patient was inpatient as patient states that his insurance does not cover our insurance outpatiently.  Patient does have a follow-up with [facility] due to this.  Discussed that the plan was to keep patient overnight and repeat CBC in the morning and go from there.  Daughter would like to see hemoglobin trending upward before he is discharged.  Discussed that even if we did colonoscopy he could still be restarted on Xarelto and still have rectal bleeding or melena.  Proceeding with colonoscopy would not stop this.  Discussed that low possibility of cancer as he is just had colonoscopy 18 months ago.   Patient states cardiology wants him on an aspirin today but Dr. Peralta wants him to take the full Xarelto.  Patient is to stay on this for at least 12 weeks per patient.  Discussed case with Dr. Cueva also.    Kim Waldron, STEVEN  07/06/25  16:26 EDT

## 2025-07-06 NOTE — PLAN OF CARE
Goal Outcome Evaluation:  Plan of Care Reviewed With: patient   Patient is improving; Labs will be reviewed and will collect new orders in the am.  Possible discharge.

## 2025-07-07 ENCOUNTER — ON CAMPUS - OUTPATIENT (AMBULATORY)
Dept: URBAN - METROPOLITAN AREA HOSPITAL 85 | Facility: HOSPITAL | Age: 60
End: 2025-07-07
Payer: COMMERCIAL

## 2025-07-07 VITALS
SYSTOLIC BLOOD PRESSURE: 118 MMHG | RESPIRATION RATE: 18 BRPM | DIASTOLIC BLOOD PRESSURE: 83 MMHG | OXYGEN SATURATION: 97 % | TEMPERATURE: 98.5 F | HEART RATE: 78 BPM | HEIGHT: 72 IN | BODY MASS INDEX: 22.93 KG/M2 | WEIGHT: 169.31 LBS

## 2025-07-07 DIAGNOSIS — D50.9 IRON DEFICIENCY ANEMIA, UNSPECIFIED: ICD-10-CM

## 2025-07-07 LAB
ANION GAP SERPL CALCULATED.3IONS-SCNC: 9.9 MMOL/L (ref 5–15)
BASOPHILS # BLD AUTO: 0.08 10*3/MM3 (ref 0–0.2)
BASOPHILS NFR BLD AUTO: 1.6 % (ref 0–1.5)
BUN SERPL-MCNC: 13.1 MG/DL (ref 8–23)
BUN/CREAT SERPL: 10.9 (ref 7–25)
CALCIUM SPEC-SCNC: 9 MG/DL (ref 8.6–10.5)
CHLORIDE SERPL-SCNC: 106 MMOL/L (ref 98–107)
CO2 SERPL-SCNC: 24.1 MMOL/L (ref 22–29)
CREAT SERPL-MCNC: 1.2 MG/DL (ref 0.76–1.27)
DEPRECATED RDW RBC AUTO: 42.9 FL (ref 37–54)
EGFRCR SERPLBLD CKD-EPI 2021: 68.8 ML/MIN/1.73
EOSINOPHIL # BLD AUTO: 0.31 10*3/MM3 (ref 0–0.4)
EOSINOPHIL NFR BLD AUTO: 6.4 % (ref 0.3–6.2)
ERYTHROCYTE [DISTWIDTH] IN BLOOD BY AUTOMATED COUNT: 12.8 % (ref 12.3–15.4)
GLUCOSE SERPL-MCNC: 103 MG/DL (ref 65–99)
HCT VFR BLD AUTO: 33.9 % (ref 37.5–51)
HGB BLD-MCNC: 11.1 G/DL (ref 13–17.7)
IMM GRANULOCYTES # BLD AUTO: 0.01 10*3/MM3 (ref 0–0.05)
IMM GRANULOCYTES NFR BLD AUTO: 0.2 % (ref 0–0.5)
LYMPHOCYTES # BLD AUTO: 1.57 10*3/MM3 (ref 0.7–3.1)
LYMPHOCYTES NFR BLD AUTO: 32.2 % (ref 19.6–45.3)
MCH RBC QN AUTO: 30.2 PG (ref 26.6–33)
MCHC RBC AUTO-ENTMCNC: 32.7 G/DL (ref 31.5–35.7)
MCV RBC AUTO: 92.4 FL (ref 79–97)
MONOCYTES # BLD AUTO: 0.45 10*3/MM3 (ref 0.1–0.9)
MONOCYTES NFR BLD AUTO: 9.2 % (ref 5–12)
NEUTROPHILS NFR BLD AUTO: 2.45 10*3/MM3 (ref 1.7–7)
NEUTROPHILS NFR BLD AUTO: 50.4 % (ref 42.7–76)
NRBC BLD AUTO-RTO: 0 /100 WBC (ref 0–0.2)
PLATELET # BLD AUTO: 285 10*3/MM3 (ref 140–450)
PMV BLD AUTO: 9.3 FL (ref 6–12)
POTASSIUM SERPL-SCNC: 4.1 MMOL/L (ref 3.5–5.2)
RBC # BLD AUTO: 3.67 10*6/MM3 (ref 4.14–5.8)
SODIUM SERPL-SCNC: 140 MMOL/L (ref 136–145)
WBC NRBC COR # BLD AUTO: 4.87 10*3/MM3 (ref 3.4–10.8)

## 2025-07-07 PROCEDURE — 85025 COMPLETE CBC W/AUTO DIFF WBC: CPT | Performed by: NURSE PRACTITIONER

## 2025-07-07 PROCEDURE — 25010000002 ENOXAPARIN PER 10 MG: Performed by: NURSE PRACTITIONER

## 2025-07-07 PROCEDURE — 99213 OFFICE O/P EST LOW 20 MIN: CPT

## 2025-07-07 PROCEDURE — G0378 HOSPITAL OBSERVATION PER HR: HCPCS

## 2025-07-07 PROCEDURE — 80048 BASIC METABOLIC PNL TOTAL CA: CPT | Performed by: NURSE PRACTITIONER

## 2025-07-07 RX ADMIN — PANTOPRAZOLE SODIUM 40 MG: 40 INJECTION, POWDER, FOR SOLUTION INTRAVENOUS at 08:01

## 2025-07-07 RX ADMIN — DESVENLAFAXINE 50 MG: 50 TABLET, EXTENDED RELEASE ORAL at 08:00

## 2025-07-07 RX ADMIN — Medication 10 ML: at 08:01

## 2025-07-07 RX ADMIN — ENOXAPARIN SODIUM 80 MG: 100 INJECTION SUBCUTANEOUS at 05:47

## 2025-07-07 NOTE — PAYOR COMM NOTE
"Observation order 7/3/25      Observation over 48 hrs.   -------------  OBSERVATION AUTHORIZATION PENDING:   PLEASE FAX OR CALL DETERMINATION TO CONTACT BELOW:       THANK YOU,    PANCHO GuzmanN, RN  Utilization Review  [facility]  Phone: [phone]  Fax: [phone]      NPI: [id number]  Tax ID: [id number]      Jamel Collins (60 y.o. Male)       Date of Birth   1965    Social Security Number       Address   [address] [place] [address]    Home Phone   [phone]    MRN   [id number]       Episcopal   None    Marital Status   Single                            Admission Date   7/3/2025    Admission Type   Emergency    Admitting Provider   Cierra Cueva MD    Attending Provider   Cierra Cueva MD    Department, Room/Bed   [facility] SURGICAL INPATIENT, 4128/1       Discharge Date       Discharge Disposition   Home or Self Care    Discharge Destination                                 Attending Provider: Cierra Cueva MD    Allergies: No Known Allergies    Isolation: None   Infection: None   Code Status: CPR    Ht: 182.9 cm (72\")   Wt: 76.8 kg (169 lb 5 oz)    Admission Cmt: None   Principal Problem: Melena [K92.1]                   Active Insurance as of 7/3/2025       Primary Coverage       Payor Plan Insurance Group Employer/Plan Group    [employer] IND EXCHANGE [employer] IND EXCHANGE [id number]       Payor Plan Address Payor Plan Phone Number Payor Plan Fax Number Effective Dates    [address] [phone]  1/1/2020 - None Entered    [facility] [id number]         Subscriber Name Subscriber Birth Date Member ID       JAMEL COLLINS 1965 [id number]                     Emergency Contacts        (Rel.) Home Phone Work Phone Mobile Phone    MEHUL OROZCO (Daughter) [phone] -- [phone]                 History & Physical        Josy Orantes APRN at 07/03/25 2200       Attestation signed by Cierra Cueva MD at 07/04/25 1137  "     I have reviewed this documentation and agree.                          Patient Care Team:  Aristeo Rodriguez MD as PCP - General (Family Medicine)  Aristeo Rodriguez MD as PCP - Family Medicine  Aristeo Rodriguez MD    Chief complaint abdominal pain, black stools    Subjective    Patient is a 60 y.o. 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  He reported these symptoms to Vascular MD during routine followup and she advised coming to the ER with concerns of GI on anticoagulation.  He notes abdominal tenderness with palpation to the right of umbilicus. He had some recent constipation, treated with laxative and noted since then, black stools.  No Peptobismol use.  Reports intermittent lightheadedness, but no orthostatic dizziness or syncope. Denies heart burn.  Has chronic epigastric discomfort.  No fever, chills, diarrhea. Left eye visual deficit remains unchanged from previous admission.    He expresses concern that he is followed by [facility] as [facility] is not in his provider list.      He is followed by Dr. Kearney for anticoagulation workup.  Lupus anticoagulant was reported as positive. Per patient, plan to repeat it in 6 months and was recommended to stay on anticoagulation until then.    In the ER, He was found to have a HGB of 10.7 which is a 2 point drop since June 19.  Creatinine 1.34 with BUN 16.  No leukocytosis, plt wNL. UA with t race ketones and leukocytes but no signs of infections.  CT abd/pelvis without acute process.      Review of Systems   Constitutional:  Negative for chills and fever.   Eyes:  Positive for visual disturbance.   Gastrointestinal:  Positive for abdominal pain, blood in stool and constipation. Negative for nausea.   Skin:  Negative for pallor.   Neurological:  Positive for  light-headedness.          History  Past Medical History:   Diagnosis Date    Laryngopharyngeal reflux (LPR)      Past Surgical History:   Procedure Laterality Date    CARDIAC CATHETERIZATION N/A 5/16/2025    Procedure: Left Heart Cath radial;  Surgeon: Richard Moore DO;  Location: [facility] CATH INVASIVE LOCATION;  Service: Cardiovascular;  Laterality: N/A;    COLONOSCOPY      COLONOSCOPY N/A 1/29/2024    Procedure: COLONOSCOPY WITH POLYPECTOMY X1;  Surgeon: Tre Fitzpatrick MD;  Location: [facility] ENDOSCOPY;  Service: Gastroenterology;  Laterality: N/A;  POST: POLYP    ENDOSCOPY N/A 1/29/2024    Procedure: ESOPHAGOGASTRODUODENOSCOPY WITH BIOPSY X1 AREA AND DILATION UP TO 50;  Surgeon: Tre Fitzpatrick MD;  Location: [facility] ENDOSCOPY;  Service: Gastroenterology;  Laterality: N/A;  POST: GASTRITIS, ESOPHAGEAL STRICTURE     Family History   Problem Relation Age of Onset    Cancer Father      Social History     Tobacco Use    Smoking status: Never     Passive exposure: Never    Smokeless tobacco: Never   Vaping Use    Vaping status: Never Used   Substance Use Topics    Alcohol use: Yes     Comment: Occasional    Drug use: Never     Medications Prior to Admission   Medication Sig Dispense Refill Last Dose/Taking    acetaminophen (TYLENOL) 325 MG tablet Take 2 tablets by mouth Every 6 (Six) Hours As Needed for Mild Pain.   Taking As Needed    aspirin 81 MG EC tablet Take 1 tablet by mouth Daily. 30 tablet 5 7/3/2025 Morning    atorvastatin (LIPITOR) 80 MG tablet Take 1 tablet by mouth Every Night. 90 tablet 3 7/2/2025 Bedtime    desvenlafaxine (PRISTIQ) 50 MG 24 hr tablet Take 1 tablet by mouth Daily.   7/3/2025 Morning    LORazepam (ATIVAN) 0.5 MG tablet TAKE 1 OR 2 TABLETS BY MOUTH 2 TIMES A DAY AS NEEDED   Taking    omeprazole (priLOSEC) 40 MG capsule Take 1 capsule by mouth Daily.   7/3/2025 Morning    rivaroxaban (XARELTO) 20 MG tablet Take 1 tablet by mouth Every Night.   7/2/2025 Bedtime      Allergies:  Patient has no known allergies.    Objective    Vital Signs  Temp:  [97.6 °F (36.4 °C)-98.8 °F (37.1 °C)] 97.6 °F (36.4 °C)  Heart Rate:  [52-89] 76  Resp:  [13-16] 13  BP: (121-167)/(67-80) 122/70     Physical Exam:      General Appearance:    Alert, cooperative, in no acute distress   Head:    Normocephalic, without obvious abnormality, atraumatic   Eyes:            Lids and lashes normal, conjunctivae and sclerae normal, no   icterus, no pallor, corneas clear, PERRLA   Ears:    Ears appear intact with no abnormalities noted   Throat:   No oral lesions, no thrush, oral mucosa moist   Neck:   No adenopathy, supple, trachea midline, no thyromegaly, no   carotid bruit, no JVD   Lungs:     Clear to auscultation,respirations regular, even and                  unlabored    Heart:    Regular rhythm and normal rate, normal S1 and S2, no            murmur, no gallop, no rub, no click   Chest Wall:    No abnormalities observed   Abdomen:     Normal bowel sounds, no masses, no organomegaly, soft        tender at 1'oclock from umbilicus, non-distended, no guarding, no rebound                tenderness   Extremities:   Moves all extremities well, no edema, no cyanosis, no             redness   Pulses:   Pulses palpable and equal bilaterally   Skin:   No bleeding, bruising or rash   Lymph nodes:   No palpable adenopathy   Neurologic:   Cranial nerves 2 - 12 grossly intact, sensation intact, DTR       present and equal bilaterally       Results Review:     Imaging Results (Last 24 Hours)       Procedure Component Value Units Date/Time    CT Abdomen Pelvis With Contrast [[id number]] Collected: 07/03/25 1832     Updated: 07/03/25 1837    Narrative:      CT ABDOMEN PELVIS W CONTRAST    Date of Exam: 7/3/2025 5:53 PM EDT    Indication: Epigastric abdominal pain, umbilical tenderness.    Comparison: 5/19/2025    Technique: Axial CT images were obtained of the abdomen and pelvis following the uneventful intravenous  administration of iodinated contrast. Sagittal and coronal reconstructions were performed.  Automated exposure control and iterative reconstruction   methods were used.        Findings:    Lower Chest: Lung bases clear    Organs: Liver, spleen, pancreas, kidneys, adrenal glands unremarkable. Gallbladder partly contracted due to recently ingested meal    Gastrointestinal: No intestinal distention or evident wall thickening. Normal appendix    Pelvis: No abnormal fluid collection. Metallic bodies in the prostate gland related to prostatic urethral lift procedure    Peritoneum/Retroperitoneum: No ascites or pneumoperitoneum. Unremarkable aorta    Bones/Soft Tissues: No acute bony abnormality      Impression:      No acute process demonstrated              Electronically Signed: Jayden Zelaya    7/3/2025 6:35 PM EDT    Workstation ID: [id number]             Lab Results (last 24 hours)       Procedure Component Value Units Date/Time    Comprehensive Metabolic Panel [[id number]]  (Abnormal) Collected: 07/03/25 1652    Specimen: Blood Updated: 07/03/25 1720     Glucose 112 mg/dL      BUN 16.4 mg/dL      Creatinine 1.34 mg/dL      Sodium 139 mmol/L      Potassium 4.0 mmol/L      Chloride 102 mmol/L      CO2 25.5 mmol/L      Calcium 9.5 mg/dL      Total Protein 7.2 g/dL      Albumin 4.8 g/dL      ALT (SGPT) 21 U/L      AST (SGOT) 29 U/L      Alkaline Phosphatase 92 U/L      Total Bilirubin 0.5 mg/dL      Globulin 2.4 gm/dL      A/G Ratio 2.0 g/dL      BUN/Creatinine Ratio 12.2     Anion Gap 11.5 mmol/L      eGFR 60.3 mL/min/1.73     Narrative:      GFR Categories in Chronic Kidney Disease (CKD)              GFR Category          GFR (mL/min/1.73)    Interpretation  G1                    90 or greater        Normal or high (1)  G2                    60-89                Mild decrease (1)  G3a                   45-59                Mild to moderate decrease  G3b                   30-44                Moderate to severe  decrease  G4                    15-29                Severe decrease  G5                    14 or less           Kidney failure    (1)In the absence of evidence of kidney disease, neither GFR category G1 or G2 fulfill the criteria for CKD.    eGFR calculation 2021 CKD-EPI creatinine equation, which does not include race as a factor    Urinalysis With Culture If Indicated - Urine, Clean Catch [[id number]]  (Abnormal) Collected: 07/03/25 1653    Specimen: Urine, Clean Catch Updated: 07/03/25 1708     Color, UA Yellow     Appearance, UA Clear     pH, UA 6.0     Specific Gravity, UA 1.018     Glucose, UA Negative     Ketones, UA Trace     Bilirubin, UA Negative     Blood, UA Negative     Protein, UA Negative     Leuk Esterase, UA Trace     Nitrite, UA Negative     Urobilinogen, UA 1.0 E.U./dL    Narrative:      In absence of clinical symptoms, the presence of pyuria, bacteria, and/or nitrites on the urinalysis result does not correlate with infection.    Urinalysis, Microscopic Only - Urine, Clean Catch [[id number]] Collected: 07/03/25 1653    Specimen: Urine, Clean Catch Updated: 07/03/25 1708     RBC, UA 0-2 /HPF      WBC, UA 0-2 /HPF      Comment: Urine culture not indicated.        Bacteria, UA None Seen /HPF      Squamous Epithelial Cells, UA None Seen /HPF      Hyaline Casts, UA None Seen /LPF      Methodology Automated Microscopy    CBC & Differential [[id number]]  (Abnormal) Collected: 07/03/25 1652    Specimen: Blood Updated: 07/03/25 1702    Narrative:      The following orders were created for panel order CBC & Differential.  Procedure                               Abnormality         Status                     ---------                               -----------         ------                     CBC Auto Differential[[id number]]        Abnormal            Final result                 Please view results for these tests on the individual orders.    CBC Auto Differential [[id number]]  (Abnormal) Collected: 07/03/25  1652    Specimen: Blood Updated: 07/03/25 1702     WBC 5.97 10*3/mm3      RBC 3.51 10*6/mm3      Hemoglobin 10.7 g/dL      Hematocrit 32.9 %      MCV 93.7 fL      MCH 30.5 pg      MCHC 32.5 g/dL      RDW 12.8 %      RDW-SD 44.0 fl      MPV 9.0 fL      Platelets 282 10*3/mm3      Neutrophil % 58.5 %      Lymphocyte % 28.5 %      Monocyte % 7.4 %      Eosinophil % 4.2 %      Basophil % 1.2 %      Immature Grans % 0.2 %      Neutrophils, Absolute 3.50 10*3/mm3      Lymphocytes, Absolute 1.70 10*3/mm3      Monocytes, Absolute 0.44 10*3/mm3      Eosinophils, Absolute 0.25 10*3/mm3      Basophils, Absolute 0.07 10*3/mm3      Immature Grans, Absolute 0.01 10*3/mm3      nRBC 0.0 /100 WBC     Extra Tubes [[id number]] Collected: 07/03/25 1652    Specimen: Blood, Venous Line Updated: 07/03/25 1700    Narrative:      The following orders were created for panel order Extra Tubes.  Procedure                               Abnormality         Status                     ---------                               -----------         ------                     Gold Top - SST[[id number]]                                   Final result               Light Blue Top[[id number]]                                   Final result                 Please view results for these tests on the individual orders.    Gold Top - SST [[id number]] Collected: 07/03/25 1652    Specimen: Blood Updated: 07/03/25 1700     Extra Tube Hold for add-ons.     Comment: Auto resulted.       Light Blue Top [[id number]] Collected: 07/03/25 1652    Specimen: Blood Updated: 07/03/25 1700     Extra Tube Hold for add-ons.     Comment: Auto resulted                I reviewed the patient's new clinical results.    Assessment & Plan      Melena    Laryngopharyngeal reflux (LPR)    Essential hypertension    Retinal artery occlusion    Anxiety      Melena- on Xarelto   -consult GI, NPO after midnight   -PPI   -IV fluids    Anxiety- continue home medication    Consider hematology consult  with concern of bleed while on ASA and Xarelto given recent history of retinal occlusion.    VTE: Xarelto on hold,   CODE STATUS:  Code status (Patient has no pulse and is not breathing):  CPR (Attempt to Resuscitate)  Medical Interventions (Patient has pulse or is breathing):  Full Support  Level of Support Discussed with:  Patient    Admission Status:  I believe this patient meets inpatient status    Expected length of stay:  2 midnights or greater    I discussed the patient's findings and my recommendations with patient.     Josy Orantes, APRN  07/04/25  00:34 EDT        Electronically signed by Cierra Cueva MD at 07/04/25 1137          Emergency Department Notes        Emelina Castaneda PA-C at 07/03/25 1703       Attestation signed by Ander Churchill MD at 07/07/25 0732          SHARED APC NON FACE TO FACE: I performed a substantive part of the MDM during the patient's E/M visit. I personally made or approved the documented management plan and acknowledge its risk of complications.   Ander Churchill MD 7/7/2025 07:32 EDT                             Subjective   History of Present Illness  Patient is a 60-year-old male with PMH of GERD presenting to the ED for abdominal pain and dark and tarry stools for the past few days.  Patient states for the past week he has been having intermittent epigastric abdominal pain described as a stinging pain along with tenderness to palpation around his umbilicus.  He rates his pain a 2 out of 10 at its worst.  Patient states for the past week he has been constipated, had been taking laxatives, was able to have small bowel movements several days ago, since then has been noticing dark tarriness to his stools.  He reports slight dysuria and intermittent lightheadedness.  He was started on blood thinners about a month ago.  He denies any fever, chills, nausea, vomiting, diarrhea, hematuria.        Review of Systems   Constitutional:  Negative for chills and fever.   Gastrointestinal:   Positive for abdominal pain, blood in stool and constipation. Negative for diarrhea, nausea and vomiting.   Genitourinary:  Positive for dysuria. Negative for hematuria.   Neurological:  Positive for light-headedness.       Past Medical History:   Diagnosis Date    Laryngopharyngeal reflux (LPR)        No Known Allergies    Past Surgical History:   Procedure Laterality Date    CARDIAC CATHETERIZATION N/A 5/16/2025    Procedure: Left Heart Cath radial;  Surgeon: Richard Moore DO;  Location: [facility] CATH INVASIVE LOCATION;  Service: Cardiovascular;  Laterality: N/A;    COLONOSCOPY      COLONOSCOPY N/A 1/29/2024    Procedure: COLONOSCOPY WITH POLYPECTOMY X1;  Surgeon: Tre Fitzpatrick MD;  Location: [facility] ENDOSCOPY;  Service: Gastroenterology;  Laterality: N/A;  POST: POLYP    ENDOSCOPY N/A 1/29/2024    Procedure: ESOPHAGOGASTRODUODENOSCOPY WITH BIOPSY X1 AREA AND DILATION UP TO 50;  Surgeon: Tre Fitzpatrick MD;  Location: [facility] ENDOSCOPY;  Service: Gastroenterology;  Laterality: N/A;  POST: GASTRITIS, ESOPHAGEAL STRICTURE       Family History   Problem Relation Age of Onset    Cancer Father        Social History     Socioeconomic History    Marital status: Single   Tobacco Use    Smoking status: Never     Passive exposure: Never    Smokeless tobacco: Never   Vaping Use    Vaping status: Never Used   Substance and Sexual Activity    Alcohol use: Yes     Comment: Occasional    Drug use: Never    Sexual activity: Defer           Objective   Physical Exam  Exam conducted with a chaperone present.   Constitutional:       Appearance: Normal appearance. He is well-developed.   HENT:      Head: Normocephalic and atraumatic.      Mouth/Throat:      Mouth: Mucous membranes are moist.   Eyes:      Extraocular Movements: Extraocular movements intact.   Cardiovascular:      Rate and Rhythm: Normal rate and regular rhythm.      Pulses: Normal pulses.      Heart sounds: Normal heart sounds.   Pulmonary:  "     Effort: Pulmonary effort is normal.      Breath sounds: Normal breath sounds.   Abdominal:      General: Abdomen is flat. Bowel sounds are normal.      Palpations: Abdomen is soft.      Tenderness: There is abdominal tenderness in the epigastric area and periumbilical area.   Genitourinary:     Rectum: Normal. Guaiac result positive. No external hemorrhoid or internal hemorrhoid.   Musculoskeletal:         General: Normal range of motion.      Cervical back: Normal range of motion.      Right lower leg: No edema.      Left lower leg: No edema.   Skin:     General: Skin is warm and dry.      Capillary Refill: Capillary refill takes less than 2 seconds.   Neurological:      General: No focal deficit present.      Mental Status: He is alert and oriented to person, place, and time.   Psychiatric:         Mood and Affect: Mood normal.         Behavior: Behavior normal.         Procedures          ED Course  ED Course as of 07/03/25 1901   Thu Jul 03, 2025 1823 Waiting for CT [EC]   1859 Spoke with Dr. Cueva hospitalist who agreed to admit patient. [EC]      ED Course User Index  [EC] Emelina Castaneda PA-C      BP (P) 138/80   Pulse (P) 67   Temp 98.8 °F (37.1 °C) (Oral)   Resp 16   Ht 182.9 cm (72\")   Wt 76.8 kg (169 lb 5 oz)   SpO2 (P) 100%   BMI 22.96 kg/m²   Labs Reviewed   COMPREHENSIVE METABOLIC PANEL - Abnormal; Notable for the following components:       Result Value    Glucose 112 (*)     Creatinine 1.34 (*)     All other components within normal limits    Narrative:     GFR Categories in Chronic Kidney Disease (CKD)              GFR Category          GFR (mL/min/1.73)    Interpretation  G1                    90 or greater        Normal or high (1)  G2                    60-89                Mild decrease (1)  G3a                   45-59                Mild to moderate decrease  G3b                   30-44                Moderate to severe decrease  G4                    15-29                Severe " decrease  G5                    14 or less           Kidney failure    (1)In the absence of evidence of kidney disease, neither GFR category G1 or G2 fulfill the criteria for CKD.    eGFR calculation 2021 CKD-EPI creatinine equation, which does not include race as a factor   URINALYSIS W/ CULTURE IF INDICATED - Abnormal; Notable for the following components:    Ketones, UA Trace (*)     Leuk Esterase, UA Trace (*)     All other components within normal limits    Narrative:     In absence of clinical symptoms, the presence of pyuria, bacteria, and/or nitrites on the urinalysis result does not correlate with infection.   CBC WITH AUTO DIFFERENTIAL - Abnormal; Notable for the following components:    RBC 3.51 (*)     Hemoglobin 10.7 (*)     Hematocrit 32.9 (*)     All other components within normal limits   URINALYSIS, MICROSCOPIC ONLY   CBC AND DIFFERENTIAL    Narrative:     The following orders were created for panel order CBC & Differential.  Procedure                               Abnormality         Status                     ---------                               -----------         ------                     CBC Auto Differential[[id number]]        Abnormal            Final result                 Please view results for these tests on the individual orders.   EXTRA TUBES    Narrative:     The following orders were created for panel order Extra Tubes.  Procedure                               Abnormality         Status                     ---------                               -----------         ------                     Gold Top - SST[[id number]]                                   Final result               Light Blue Top[[id number]]                                   Final result                 Please view results for these tests on the individual orders.   GOLD TOP - SST   LIGHT BLUE TOP     Medications   pantoprazole (PROTONIX) injection 80 mg (80 mg Intravenous Given 7/3/25 0675)   sodium chloride 0.9 % bolus  1,000 mL (1,000 mL Intravenous New Bag 7/3/25 1804)   iopamidol (ISOVUE-370) 76 % injection 100 mL (100 mL Intravenous Given 7/3/25 1804)     CT Abdomen Pelvis With Contrast  Result Date: 7/3/2025  No acute process demonstrated Electronically Signed: Jayden Zelaya  7/3/2025 6:35 PM EDT  Workstation ID: [id number]                                                     Medical Decision Making  Chart review: 7/3/25 Dr. Mcdonald Vascular Surgery: Flip Vang is a 60 year old male who returns for follow up of a right radial pseudoaneurysm s/p [facility], treated with compression with a TR band.  Duplex today showed a thrombosed pseudoaneurysm, with normal waveforms throughout the radial artery.  He has no restrictions from my standpoint and will follow up as needed.  Of note, he reports occasional abdominal pain, constipation, and dark stools that started after he started taking the xarelto.  He does not take iron.  He has not thrown up blood.  He does occasionally feel dizzy and lightheaded, but he denies dizziness and lightheadedness with standing or near syncope.  I am concerned about a possible GI bleed, which I counseled him about, especially since he is on therapeutic anticoagulation.  His vital signs were normal and he does not have any symptoms of anemia so I instructed him to call his GI doctor for further instruction and to report to the ED should he develop any dizziness, lightheadedness, or near syncope.      Patient presented to the ED for the above complaint.    Patient underwent the above exam and evaluation.    While in the ED patient was placed in gown and IV was established.  CT abdomen pelvis and blood work was obtained to assess for intra-abdominal infection, perforation, abscess, electro abnormality, dehydration, anemia.  Patient was given IV Protonix and fluids.  Upon reevaluation patient resting comfortably, vital stable.  Discussed findings with patient and family at bedside.  Educated patient that we will be  admitting him for further evaluation and treatment.  Patient family voiced understanding, agreeable with dispo plan.  Spoke to Dr. Brewer with [facility] who agreed to admit patient.    Labs were independently interpreted by myself and deemed remarkable for the following: No leukocytosis, hemoglobin dropping at 10.7, slightly elevated creatinine at 1.34, no electrolyte abnormalities, urinalysis negative for hematuria bacteria or pyuria.  CT abdomen pelvis independently interpreted by radiologist and reviewed by myself showing: No acute intra-abdominal infection, abscess, perforation, or obstruction.    Appropriate PPE was worn during each patient encounter.    Note Disclaimer: At [facility], we believe that sharing information builds trust and better relationships. You are receiving this note because you are receiving care at [facility] or recently visited. It is possible you will see health information before a provider has talked with you about it. This kind of information can be easy to misunderstand. To help you fully understand what it means for your health, we urge you to discuss this note with your provider.    Based on clinical findings anticipate this patient will require a 2 midnight stay.    Discussed this patient with Dr. Churchill who agrees with plan.      Problems Addressed:  Generalized abdominal pain: complicated acute illness or injury  Melena: complicated acute illness or injury  Upper GI bleed: complicated acute illness or injury    Amount and/or Complexity of Data Reviewed  Labs: ordered.  Radiology: ordered.    Risk  Prescription drug management.  Decision regarding hospitalization.        Final diagnoses:   Generalized abdominal pain   Upper GI bleed   Melena       ED Disposition  ED Disposition       ED Disposition   Decision to Admit    Condition   --    Comment   Level of Care: Telemetry [5]   Admitting Physician: TRACIE BREWER [[id number]]   Attending Physician: TRACIE BERWER [[id number]]                  No follow-up provider specified.       Medication List      No changes were made to your prescriptions during this visit.            Emelina Castaneda PA-C  07/03/25 1901      Electronically signed by Ander Churchill MD at 07/07/25 0732       Vital Signs (last day)       Date/Time Temp Temp src Pulse Resp BP Patient Position SpO2    07/07/25 0823 98.5 (36.9) Oral 78 18 118/83 Lying 97    07/07/25 0039 97.5 (36.4) Oral 83 19 124/69 Lying 98    07/06/25 1654 97.9 (36.6) Oral 71 16 135/79 Sitting 99    07/06/25 1210 98.3 (36.8) Oral 75 16 125/71 Sitting 95    07/06/25 0814 97.7 (36.5) Oral 84 16 126/67 Lying 98    07/06/25 0445 97.4 (36.3) Oral 97 18 113/60 Lying 96    07/06/25 0124 -- -- 66 -- -- -- 97          Facility-Administered Medications as of 7/7/2025   Medication Dose Route Frequency Provider Last Rate Last Admin    acetaminophen (TYLENOL) tablet 650 mg  650 mg Oral Q4H PRN YG Bettencourt MD   650 mg at 07/05/25 0021    Or    acetaminophen (TYLENOL) 160 MG/5ML oral solution 650 mg  650 mg Oral Q4H PRN YG Bettencourt MD        Or    acetaminophen (TYLENOL) suppository 650 mg  650 mg Rectal Q4H PRN YG Bettencourt MD        [Held by provider] aspirin EC tablet 81 mg  81 mg Oral Daily Cierra Cueva MD        atorvastatin (LIPITOR) tablet 80 mg  80 mg Oral Nightly Kim Tapia APRN   80 mg at 07/06/25 2051    Calcium Replacement - Follow Nurse / BPA Driven Protocol   Not Applicable PRN YG Bettencourt MD        desvenlafaxine (PRISTIQ) 24 hr tablet 50 mg  50 mg Oral Daily YG Bettencourt MD   50 mg at 07/07/25 0800    enoxaparin sodium (LOVENOX) syringe 80 mg  1 mg/kg Subcutaneous Q12H Kim Tapia APRN   80 mg at 07/07/25 0547    [COMPLETED] ferric gluconate (FERRLECIT)125 MG in sodium chloride 0.9 % 100 mL IVPB  125 mg Intravenous Once Walter Xie PA-C   Stopped at 07/04/25 1400    [COMPLETED] iopamidol (ISOVUE-370) 76 % injection 100 mL  100 mL Intravenous Once in imaging Tonya  Emelina VENEGAS PA-C   100 mL at 25 1804    LORazepam (ATIVAN) tablet 0.5 mg  0.5 mg Oral Q6H PRN YG Bettencourt MD   0.5 mg at 25 205    Magnesium Standard Dose Replacement - Follow Nurse / BPA Driven Protocol   Not Applicable PRN YG Bettencourt MD        nitroglycerin (NITROSTAT) SL tablet 0.4 mg  0.4 mg Sublingual Q5 Min PRN YG Bettencourt MD        ondansetron (ZOFRAN) injection 4 mg  4 mg Intravenous Q6H PRN YG Bettencourt MD        pantoprazole (PROTONIX) injection 40 mg  40 mg Intravenous BID AC YG Bettencourt MD   40 mg at 25 0801    [COMPLETED] pantoprazole (PROTONIX) injection 80 mg  80 mg Intravenous Once Emelina Castaneda PA-C   80 mg at 25 1708    Pharmacy to Dose enoxaparin (LOVENOX)   Not Applicable Continuous PRN Kim Tapia APRN        Phosphorus Replacement - Follow Nurse / BPA Driven Protocol   Not Applicable PRYG Barclay MD        [] potassium chloride (KLOR-CON M20) CR tablet 40 mEq  40 mEq Oral Q4H Cierra Cueva MD   40 mEq at 25 0927    Potassium Replacement - Follow Nurse / BPA Driven Protocol   Not Applicable PRYG Barclay MD        [COMPLETED] sodium chloride 0.9 % bolus 1,000 mL  1,000 mL Intravenous Once Emelina Castaneda PA-C 2,000 mL/hr at 25 1804 1,000 mL at 25 1804    sodium chloride 0.9 % bolus 500 mL  500 mL Intravenous Once Kim Tapia APRN   Held at 25 1405    sodium chloride 0.9 % flush 10 mL  10 mL Intravenous Q12H YG Bettencourt MD   10 mL at 25 0801    sodium chloride 0.9 % flush 10 mL  10 mL Intravenous PRN YG Bettencourt MD        sodium chloride 0.9 % infusion 40 mL  40 mL Intravenous PRN YG Bettencourt MD        [] sodium chloride 0.9 % infusion  50 mL/hr Intravenous Continuous Josy Orantes APRN   Stopped at 25 0044    [] sodium chloride 0.9 % infusion  100 mL/hr Intravenous Continuous Kim Tapia APRN   Stopped at 25 1321         Operative/Procedure Notes (all)        Procedures signed by YG Bettencourt MD at 07/05/25 1049   Version 1 of 1       Procedure Orders    1. Upper GI Endoscopy [[id number]] ordered by YG Bettencourt MD at 07/05/25 1016               [Media Unavailable] Scan on 7/5/2025 1048 by YG Bettencourt MD: EGD          Electronically signed by YG Bettencourt MD at 07/05/25 1049       YG Bettencourt MD at 07/05/25 1043  Version 2 of 2         ESOPHAGOGASTRODUODENOSCOPY Procedure Report    Patient Name:  Flip Vang  YOB: 1965    Date of Surgery:  7/5/2025     Pre-Op Diagnosis:  Melena [K92.1]       Post-Op Diagnosis Codes:     * Melena [K92.1]    Post Op Diagnosis:  Minimal gastritis  Otherwise normal EGD    Procedure/CPT® Codes:  DE ESOPHAGOGASTRODUODENOSCOPY TRANSORAL DIAGNOSTIC [41220]    Procedure(s):  ESOPHAGOGASTRODUODENOSCOPY    Staff:  Surgeon(s):  YG Bettencourt MD      Anesthesia: Monitored Anesthesia Care    Code status:  Discussed code status with patient and they will remain full code    Description of Procedure:  A description of the procedure as well as risks, benefits and alternative methods were explained to the patient who voiced understanding and signed the corresponding consent form. A physical exam was performed and vital signs were monitored throughout the procedure.    An upper GI endoscope was placed into the mouth and proceeded through the esophagus, stomach and second portion of the duodenum without difficulty. The scope was then retroflexed and the fundus was visualized. The procedure was not difficult and there were no immediate complications.  There was no blood loss.    EGD Findings:  1.  Minimal erythema in the stomach antrum.  1 area that appeared to be healed ulcer/erosion in the antrum.  Otherwise no ulcers or erosions were seen.  No blood in the stomach.  2.  Normal mucosa of the duodenal bulb and 2nd and 3rd portion of the duodenum.  No angioectasia were  seen.  No blood in the duodenum  3.  Normal mucosa of the whole esophagus    Recommendations:  -Continue PPI  -Will discuss w/ pt and family regarding repeating colonoscopy, versus outpatient as scheduled with [facility] next month  -Otherwise OK to resume anticoagulation and monitor cbc weekly  -OK to resume regular diet  -avoid NSAID's, use tylenol for pain control    GI will see inpatient as needed. Will sign off, but please feel free to call us back if further assistance is needed.  Thank you        GEETA Bettencourt MD     Date: 7/5/2025    Time: 10:48 EDT          Electronically signed by YG Bettencourt MD at 07/05/25 1057       YG Bettencourt MD at 07/05/25 1043  Version 1 of 2         ESOPHAGOGASTRODUODENOSCOPY Procedure Report    Patient Name:  Flip Vang  YOB: 1965    Date of Surgery:  7/5/2025     Pre-Op Diagnosis:  Melena [K92.1]       Post-Op Diagnosis Codes:     * Melena [K92.1]    Post Op Diagnosis:  Minimal gastritis  Otherwise normal EGD    Procedure/CPT® Codes:  [place] ESOPHAGOGASTRODUODENOSCOPY TRANSORAL DIAGNOSTIC [36298]    Procedure(s):  ESOPHAGOGASTRODUODENOSCOPY    Staff:  Surgeon(s):  YG Bettencourt MD      Anesthesia: Monitored Anesthesia Care    Code status:  Discussed code status with patient and they will remain full code    Description of Procedure:  A description of the procedure as well as risks, benefits and alternative methods were explained to the patient who voiced understanding and signed the corresponding consent form. A physical exam was performed and vital signs were monitored throughout the procedure.    An upper GI endoscope was placed into the mouth and proceeded through the esophagus, stomach and second portion of the duodenum without difficulty. The scope was then retroflexed and the fundus was visualized. The procedure was not difficult and there were no immediate complications.  There was no blood loss.    EGD Findings:  1.  Minimal erythema in the  stomach antrum.  1 area that appeared to be healed ulcer/erosion in the antrum.  Otherwise no ulcers or erosions were seen.  No blood in the stomach.  2.  Normal mucosa of the duodenal bulb and 2nd and 3rd portion of the duodenum.  No angioectasia were seen.  No blood in the duodenum  3.  Normal mucosa of the whole esophagus    Recommendations:  -Continue PPI  -Will discuss w/ pt and family regarding repeating colonoscopy, versus outpatient as scheduled with [facility] next month  -Otherwise OK to resume anticoagulation and monitor  -OK to resume regular diet  -avoid NSAID's, use tylenol for pain control    GI will see inpatient as needed. Will sign off, but please feel free to call us back if further assistance is needed.  Thank you        GEETA Bettencourt MD     Date: 7/5/2025    Time: 10:48 EDT          Electronically signed by YG Bettencourt MD at 07/05/25 1052          Physician Progress Notes (last 48 hours)        uJanis Adair APRN at 07/07/25 0954           LOS: 0 days   Patient Care Team:  Aristeo Rodriguez MD as PCP - General (Family Medicine)  Aristeo Rodriguez MD as PCP - Family Medicine  Aristeo Rodriguez MD      Subjective     Interval History:     Subjective: Patient reports feeling very well.  Reports tolerating regular diet with no abdominal pain, nausea, or vomiting.  Reports x 1 brown BM yesterday.  Denies BRBPR or melena.      ROS:   No chest pain, shortness of breath, or cough.        Medication Review:     Current Facility-Administered Medications:     acetaminophen (TYLENOL) tablet 650 mg, 650 mg, Oral, Q4H PRN, 650 mg at 07/05/25 0021 **OR** acetaminophen (TYLENOL) 160 MG/5ML oral solution 650 mg, 650 mg, Oral, Q4H PRN **OR** acetaminophen (TYLENOL) suppository 650 mg, 650 mg, Rectal, Q4H PRN, YG Bettencourt MD    [Held by provider] aspirin EC tablet 81 mg, 81 mg, Oral, Daily, Cierra Cueva MD    atorvastatin (LIPITOR) tablet 80 mg, 80 mg, Oral, Nightly, Kim Tapia APRN, 80 mg at 07/06/25  2051    Calcium Replacement - Follow Nurse / BPA Driven Protocol, , Not Applicable, PRAYLEEN, YG Bettencourt MD    desvenlafaxine (PRISTIQ) 24 hr tablet 50 mg, 50 mg, Oral, Daily, YG Bettencourt MD, 50 mg at 07/07/25 0800    enoxaparin sodium (LOVENOX) syringe 80 mg, 1 mg/kg, Subcutaneous, Q12H, Kim Tapia APRN, 80 mg at 07/07/25 0547    LORazepam (ATIVAN) tablet 0.5 mg, 0.5 mg, Oral, Q6H PRN, YG Bettencourt MD, 0.5 mg at 07/06/25 2051    Magnesium Standard Dose Replacement - Follow Nurse / BPA Driven Protocol, , Not Applicable, Rut LAI R Tyler, MD    nitroglycerin (NITROSTAT) SL tablet 0.4 mg, 0.4 mg, Sublingual, Q5 Min PRN, YG Bettencourt MD    ondansetron (ZOFRAN) injection 4 mg, 4 mg, Intravenous, Q6H PRN, YG Bettencourt MD    pantoprazole (PROTONIX) injection 40 mg, 40 mg, Intravenous, BID AC, YG Bettencourt MD, 40 mg at 07/07/25 0801    Pharmacy to Dose enoxaparin (LOVENOX), , Not Applicable, Continuous PRN, Kim Tapia, APRN    Phosphorus Replacement - Follow Nurse / BPA Driven Protocol, , Not Applicable, PRAYLEEN, YG Bettencourt MD    Potassium Replacement - Follow Nurse / BPA Driven Protocol, , Not Applicable, PRRut ABBASI R Tyler, MD    sodium chloride 0.9 % bolus 500 mL, 500 mL, Intravenous, Once, Kim Tapia, APRN, Held at 07/05/25 1405    sodium chloride 0.9 % flush 10 mL, 10 mL, Intravenous, Q12H, YG Bettencourt MD, 10 mL at 07/07/25 0801    sodium chloride 0.9 % flush 10 mL, 10 mL, Intravenous, PRN, YG Bettencourt MD    sodium chloride 0.9 % infusion 40 mL, 40 mL, Intravenous, PRN, YG Bettencourt MD      Objective     Vital Signs  Vitals:    07/06/25 1210 07/06/25 1654 07/07/25 0039 07/07/25 0823   BP: 125/71 135/79 124/69 118/83   BP Location: Left arm Left arm Left arm Right arm   Patient Position: Sitting Sitting Lying Lying   Pulse: 75 71 83 78   Resp: 16 16 19 18   Temp: 98.3 °F (36.8 °C) 97.9 °F (36.6 °C) 97.5 °F (36.4 °C) 98.5 °F (36.9 °C)   TempSrc:  Oral Oral Oral Oral   SpO2: 95% 99% 98% 97%   Weight:       Height:           Physical Exam:     General Appearance:    Awake and alert, in no acute distress, sitting in bed eating breakfast   Head:    Normocephalic, without obvious abnormality   Eyes:          Conjunctivae normal, anicteric sclera       Neck:   no JVD   Lungs:     respirations regular, even and unlabored, room air   Abdomen:     Soft, non-tender, no rebound or guarding, non-distended       Extremities:   No edema, no cyanosis   Skin:   No bruising or rash, no jaundice        Results Review:    Results from last 7 days   Lab Units 07/07/25 0535 07/06/25 0526 07/05/25 0042 07/04/25 0446 07/03/25  1652   WBC 10*3/mm3 4.87 3.98 4.87 4.48 5.97   HEMOGLOBIN g/dL 11.1* 8.9* 9.9* 9.7* 10.7*   PLATELETS 10*3/mm3 285 227 259 239 282     Results from last 7 days   Lab Units 07/07/25 0535 07/06/25 0526 07/05/25 0042 07/04/25 0446 07/03/25  1652   SODIUM mmol/L 140 141 141 142 139   POTASSIUM mmol/L 4.1 3.5 4.1 4.1 4.0   CHLORIDE mmol/L 106 111* 107 107 102   CO2 mmol/L 24.1 21.1* 25.0 24.9 25.5   BUN mg/dL 13.1 9.9 13.5 13.8 16.4   CREATININE mg/dL 1.20 1.04 1.45* 1.20 1.34*   GLUCOSE mg/dL 103* 91 122* 103* 112*   ALBUMIN g/dL  --   --   --   --  4.8   BILIRUBIN mg/dL  --   --   --   --  0.5   ALK PHOS U/L  --   --   --   --  92   AST (SGOT) U/L  --   --   --   --  29   ALT (SGPT) U/L  --   --   --   --  21   INR   --   --  1.04  --   --      Estimated Creatinine Clearance: 70.2 mL/min (by C-G formula based on SCr of 1.2 mg/dL).  Lab Results   Component Value Date    HGBA1C 5.37 05/19/2025         Infection     UA    Results from last 7 days   Lab Units 07/03/25  1653   NITRITE UA  Negative   WBC UA /HPF 0-2   BACTERIA UA /HPF None Seen   SQUAM EPITHEL UA /HPF None Seen     Microbiology Results (last 10 days)       ** No results found for the last 240 hours. **          Imaging Results (Last 72 Hours)       ** No results found for the last 72 hours.  **            Assessment & Plan       60 y.o. male with PMH +Lupus antioag on xarelto (followed by Dr. 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 He presented to the ER due to melena at the recommendation of his vascular surgeon.  GI was consulted for melena.  He was recently started on Xarelto approximately 1 month ago.     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  1 area that appeared to be healed ulcer/erosion in the antrum.  No blood in the stomach.  Normal duodenum.  Normal esophagus.     PLAN:  - Hemoglobin 11.1 from 8.9.  Likely hemodilutional due to 2 L bolus for JACKLYN.  No signs of overt GI bleeding.  - Okay to discharge from GI standpoint.  - Continue diet as tolerated.  - Per patient, he is to continue Xarelto for at least 12 weeks.  - Supportive care with antiemetics analgesics as needed.      Electronically signed by STEVEN Orellana, 07/07/25, 9:54 AM EDT.           Electronically signed by Juanis Adair APRN at 07/07/25 0959       Kim Waldron APRN at 07/06/25 1626       Summary:RECONSULT                  LOS: 0 days   Patient Care Team:  Aristeo Rodriguez MD as PCP - General (Family Medicine)  Aristeo Rodriguez MD as PCP - Family Medicine  Aristeo Rodriguez MD      Subjective     Interval History:   Asked to see again as patient/daughter are asking for repeat colonoscopy.   Patient just had high quality colonoscopy 18 months ago by Dr Fitzpatrick.   Patient had told Dr Bettencourt yesterday that he was seeing [facility] as an outpatient as we do not accept his insurance as an outpatient.   Spoke with wife, daughter & patient at length.   LABS: Sodium and potassium are normal.  Creatinine 1.04.  WBCs 3.98, hemoglobin 8.9 from 9.9, platelets 227.  Patient did receive 2  L fluid bolus yesterday.   He states his stool is more brown in color today.  No abdominal pain, nausea, vomiting.  Tolerating diet.    ROS:   No chest pain, shortness of breath, or cough.         Medication Review:     Current Facility-Administered Medications:     acetaminophen (TYLENOL) tablet 650 mg, 650 mg, Oral, Q4H PRN, 650 mg at 07/05/25 0021 **OR** acetaminophen (TYLENOL) 160 MG/5ML oral solution 650 mg, 650 mg, Oral, Q4H PRN **OR** acetaminophen (TYLENOL) suppository 650 mg, 650 mg, Rectal, Q4H PRN, YG Bettencourt MD    [Held by provider] aspirin EC tablet 81 mg, 81 mg, Oral, Daily, Cierra Cueva MD    atorvastatin (LIPITOR) tablet 80 mg, 80 mg, Oral, Nightly, Kim Tapia, APRN, 80 mg at 07/05/25 2036    Calcium Replacement - Follow Nurse / BPA Driven Protocol, , Not Applicable, PRN, YG Bettencourt MD    desvenlafaxine (PRISTIQ) 24 hr tablet 50 mg, 50 mg, Oral, Daily, YG Bettencourt MD, 50 mg at 07/06/25 0927    enoxaparin sodium (LOVENOX) syringe 80 mg, 1 mg/kg, Subcutaneous, Q12H, Kim Tapia, APRN, 80 mg at 07/06/25 0624    LORazepam (ATIVAN) tablet 0.5 mg, 0.5 mg, Oral, Q6H PRN, YG Bettencourt MD, 0.5 mg at 07/05/25 2150    Magnesium Standard Dose Replacement - Follow Nurse / BPA Driven Protocol, , Not Applicable, PRN, YG Bettencourt MD    nitroglycerin (NITROSTAT) SL tablet 0.4 mg, 0.4 mg, Sublingual, Q5 Min PRN, YG Bettencourt MD    ondansetron (ZOFRAN) injection 4 mg, 4 mg, Intravenous, Q6H PRN, YG Bettencourt MD    pantoprazole (PROTONIX) injection 40 mg, 40 mg, Intravenous, BID AC, YG Bettencourt MD, 40 mg at 07/06/25 0927    Pharmacy to Dose enoxaparin (LOVENOX), , Not Applicable, Continuous PRN, Kim Tapia APRN    Phosphorus Replacement - Follow Nurse / BPA Driven Protocol, , Not Applicable, Rut LAI R Tyler, MD    Potassium Replacement - Follow Nurse / BPA Driven Protocol, , Not Applicable, Rut LAI R Tyler, MD    sodium chloride 0.9 % bolus 500  mL, 500 mL, Intravenous, Once, Kim Tapia, APRN, Held at 07/05/25 1405    sodium chloride 0.9 % flush 10 mL, 10 mL, Intravenous, Q12H, YG Bettencourt MD, 10 mL at 07/06/25 0927    sodium chloride 0.9 % flush 10 mL, 10 mL, Intravenous, PRN, YG Bettencourt MD    sodium chloride 0.9 % infusion 40 mL, 40 mL, Intravenous, PRN, YG Bettencourt MD      Objective resting comfortably in hospital bed.  NAD.  Wife at bedside.  Daughter Meliza on the speaker phone.  Room 4128.    Vital Signs  Temp:  [97.4 °F (36.3 °C)-98.3 °F (36.8 °C)] 98.3 °F (36.8 °C)  Heart Rate:  [66-97] 75  Resp:  [12-18] 16  BP: (113-131)/(60-73) 125/71    Physical Exam:  General Appearance:    Awake and alert, in no acute distress   Head:    Normocephalic, without obvious abnormality   Eyes:          Conjunctivae normal, anicteric sclerae   Ears:    Hearing intact   Throat:   No oral lesions, no thrush, oral mucosa moist   Neck:   No adenopathy, supple, no JVD   Lungs:     respirations regular, even and unlabored       Abdomen:    soft, non-tender, no rebound or guarding, non-distended, no hepatosplenomegaly   Rectal:     Deferred   Extremities:   No edema, no cyanosis, no redness   Skin:   No bleeding, bruising or rash, no jaundice   Neurologic:   Cranial nerves 2 - 12 grossly intact, no asterixis, sensation   intact        Results Review:    CBC    Results from last 7 days   Lab Units 07/06/25  0526 07/05/25  0042 07/04/25 0446 07/03/25  1652   WBC 10*3/mm3 3.98 4.87 4.48 5.97   HEMOGLOBIN g/dL 8.9* 9.9* 9.7* 10.7*   PLATELETS 10*3/mm3 227 259 239 282     CMP   Results from last 7 days   Lab Units 07/06/25  0526 07/05/25  0042 07/04/25  0446 07/03/25  1652   SODIUM mmol/L 141 141 142 139   POTASSIUM mmol/L 3.5 4.1 4.1 4.0   CHLORIDE mmol/L 111* 107 107 102   CO2 mmol/L 21.1* 25.0 24.9 25.5   BUN mg/dL 9.9 13.5 13.8 16.4   CREATININE mg/dL 1.04 1.45* 1.20 1.34*   GLUCOSE mg/dL 91 122* 103* 112*   ALBUMIN g/dL  --   --   --  4.8    BILIRUBIN mg/dL  --   --   --  0.5   ALK PHOS U/L  --   --   --  92   AST (SGOT) U/L  --   --   --  29   ALT (SGPT) U/L  --   --   --  21     Cr Clearance Estimated Creatinine Clearance: 81 mL/min (by C-G formula based on SCr of 1.04 mg/dL).  Coag   Results from last 7 days   Lab Units 07/05/25  0042   INR  1.04     HbA1C   Lab Results   Component Value Date    HGBA1C 5.37 05/19/2025     Blood Glucose   Glucose   Date/Time Value Ref Range Status   07/04/2025 1013 86 70 - 105 mg/dL Final     Comment:     Serial Number: [id number]:  [id number]     Infection     UA    Results from last 7 days   Lab Units 07/03/25  1653   NITRITE UA  Negative   WBC UA /HPF 0-2   BACTERIA UA /HPF None Seen   SQUAM EPITHEL UA /HPF None Seen     Radiology(recent) No radiology results for the last day          Assessment & Plan   60 y.o. male with PMH +Lupus antioag on xarelto (followed by Dr. 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 He presented to the ER due to melena at the recommendation of his vascular surgeon.  GI was consulted for melena.  He was recently started on Xarelto approximately 1 month ago.     Problem List:  Melena  Iron deficiency anemia  + Lupus anticoagulant on Xarelto, being held    7/5/25 EGD (Dr Bettencourt) normal erythema in the stomach antrum.  1 area that appeared to be healed ulcer/erosion in the antrum.  No blood in the stomach.  Normal duodenum.  Normal esophagus.    Plan:  Hemoglobin dropped from 9.8-8.8 today after fluid bolus yesterday.  Patient has been restarted on Lovenox.  Asked to reconsult today as daughter and patient was requesting colonoscopy.  Long discussion with patient, wife, daughter regarding colonoscopy being low yield.  Patient was concerned about rectal bleeding/lower GI bleeding.  Discussed that generally bright red blood is generally hemorrhoidal, diverticular, AVM and if they are not actively bleeding during a colonoscopy they are  not seen.  Discussed that generally after stopping anticoagulation and doing bowel purge nothing is found in the colon actively bleeding.  Did offer to do colonoscopy while patient was inpatient as patient states that his insurance does not cover our insurance outpatiently.  Patient does have a follow-up with [facility] due to this.  Discussed that the plan was to keep patient overnight and repeat CBC in the morning and go from there.  Daughter would like to see hemoglobin trending upward before he is discharged.  Discussed that even if we did colonoscopy he could still be restarted on Xarelto and still have rectal bleeding or melena.  Proceeding with colonoscopy would not stop this.  Discussed that low possibility of cancer as he is just had colonoscopy 18 months ago.   Patient states cardiology wants him on an aspirin today but Dr. Peralta wants him to take the full Xarelto.  Patient is to stay on this for at least 12 weeks per patient.  Discussed case with Dr. Cueva also.    STEVEN Keller  25  16:26 EDT              Electronically signed by Kim Waldron APRN at 25 1638       Xena Peralta MD at 25 1431                Hematology/Oncology Inpatient Progress Note    PATIENT NAME: Flip Vang  : 1965  MRN: [id number]    CHIEF COMPLAINT: Black stools    HISTORY OF PRESENT ILLNESS:      Flip Vang is a 60 y.o. male medical history of hyperlipidemia, hypertension, anxiety, laryngeal pharyngeal reflux, retinal artery occlusion with visual deficit, who presented to [facility] on 7/3/2025 with complaints of black stool.  He is currently on anticoagulation with Xarelto.  Gastroenterology was consulted and he  underwent an EGD this admission which showed mild gastritis and normal EGD.  Future colonoscopy is being considered.  Iron profile during this admission showed an iron of 18 ferritin 16.10 iron saturation 5 transferrin 250 TIBC 373, current hemoglobin 9.9.      07/05/25  Hematology/Oncology was consulted .  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  He was negative for acute stroke.  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  Dr Kearney  was consulted for possible hypercoagulability. Anticardiolipin and antibeta 2 glycoprotein antibody testing was negative. Lupus anticoagulant was reported positive .  It was unclear whether the positive result was due to the timing of the sample.  APTT was within normal range.  He was placed on Xarelto 20 mg daily.  He was seen by  6/19/25 and was continued on anticoagulation for now and scheduled for follow-up in 3 months with additional laboratory tests.  He has not had a bowel movement since EGD. Denies abdominal pain.  Reviewed his records     He/She  has a past medical history of Laryngopharyngeal reflux (LPR).     PCP: Aristeo Rodriguez MD  Subjective     Patient had brown stool past 24 hours. No abdominal pain    ROS:  Review of Systems   Constitutional:  Negative for chills, fatigue and fever.   HENT:  Negative for congestion, drooling, ear discharge, rhinorrhea, sinus pressure and tinnitus.    Eyes:  Negative for photophobia, pain and discharge.   Respiratory:  Negative for apnea, choking and stridor.    Cardiovascular:  Negative for palpitations.   Gastrointestinal:  Negative for abdominal distention, abdominal pain and anal bleeding.   Endocrine: Negative for polydipsia and polyphagia.   Genitourinary:  Negative for decreased urine volume, flank pain and genital sores.   Musculoskeletal:  Negative for gait problem, neck pain and neck stiffness.   Skin:  Negative for color change, rash and wound.   Neurological:  Negative for tremors, seizures, syncope, facial asymmetry and speech difficulty.   Hematological:  Negative for  "adenopathy.   Psychiatric/Behavioral:  Negative for agitation, confusion, hallucinations and self-injury. The patient is not hyperactive.         MEDICATIONS:    Scheduled Meds:  [Held by provider] aspirin, 81 mg, Oral, Daily  atorvastatin, 80 mg, Oral, Nightly  desvenlafaxine, 50 mg, Oral, Daily  enoxaparin sodium, 1 mg/kg, Subcutaneous, Q12H  pantoprazole, 40 mg, Intravenous, BID AC  potassium chloride ER, 40 mEq, Oral, Q4H  sodium chloride, 500 mL, Intravenous, Once  sodium chloride, 10 mL, Intravenous, Q12H       Continuous Infusions:  Pharmacy to Dose enoxaparin (LOVENOX),        PRN Meds:    acetaminophen **OR** acetaminophen **OR** acetaminophen    Calcium Replacement - Follow Nurse / BPA Driven Protocol    LORazepam    Magnesium Standard Dose Replacement - Follow Nurse / BPA Driven Protocol    nitroglycerin    ondansetron    Pharmacy to Dose enoxaparin (LOVENOX)    Phosphorus Replacement - Follow Nurse / BPA Driven Protocol    Potassium Replacement - Follow Nurse / BPA Driven Protocol    sodium chloride    sodium chloride     ALLERGIES:  No Known Allergies    Objective    VITALS:   /71 (BP Location: Left arm, Patient Position: Sitting)   Pulse 75   Temp 98.3 °F (36.8 °C) (Oral)   Resp 16   Ht 182.9 cm (72\")   Wt 76.8 kg (169 lb 5 oz)   SpO2 95%   BMI 22.96 kg/m²     PHYSICAL EXAM: (performed by MD)  Physical Exam  Constitutional:       General: He is not in acute distress.     Appearance: He is not ill-appearing.   HENT:      Right Ear: External ear normal.      Left Ear: External ear normal.   Eyes:      Extraocular Movements: Extraocular movements intact.      Conjunctiva/sclera: Conjunctivae normal.      Pupils: Pupils are equal, round, and reactive to light.   Pulmonary:      Effort: Pulmonary effort is normal.   Neurological:      General: No focal deficit present.      Mental Status: Mental status is at baseline.   Psychiatric:         Mood and Affect: Mood normal.         Behavior: " Behavior normal.         Thought Content: Thought content normal.         Judgment: Judgment normal.           RECENT LABS:  Lab Results (last 24 hours)       Procedure Component Value Units Date/Time    Basic Metabolic Panel [[id number]]  (Abnormal) Collected: 07/06/25 0526    Specimen: Blood Updated: 07/06/25 0606     Glucose 91 mg/dL      BUN 9.9 mg/dL      Creatinine 1.04 mg/dL      Sodium 141 mmol/L      Potassium 3.5 mmol/L      Comment: Specimen hemolyzed.  Result may be falsely elevated.        Chloride 111 mmol/L      CO2 21.1 mmol/L      Calcium 7.3 mg/dL      BUN/Creatinine Ratio 9.5     Anion Gap 8.9 mmol/L      eGFR 81.7 mL/min/1.73     Narrative:      GFR Categories in Chronic Kidney Disease (CKD)              GFR Category          GFR (mL/min/1.73)    Interpretation  G1                    90 or greater        Normal or high (1)  G2                    60-89                Mild decrease (1)  G3a                   45-59                Mild to moderate decrease  G3b                   30-44                Moderate to severe decrease  G4                    15-29                Severe decrease  G5                    14 or less           Kidney failure    (1)In the absence of evidence of kidney disease, neither GFR category G1 or G2 fulfill the criteria for CKD.    eGFR calculation 2021 CKD-EPI creatinine equation, which does not include race as a factor    CBC & Differential [[id number]]  (Abnormal) Collected: 07/06/25 0526    Specimen: Blood Updated: 07/06/25 0540    Narrative:      The following orders were created for panel order CBC & Differential.  Procedure                               Abnormality         Status                     ---------                               -----------         ------                     CBC Auto Differential[[id number]]        Abnormal            Final result                 Please view results for these tests on the individual orders.    CBC Auto Differential [[id number]]   (Abnormal) Collected: 07/06/25 0526    Specimen: Blood Updated: 07/06/25 0540     WBC 3.98 10*3/mm3      RBC 2.93 10*6/mm3      Hemoglobin 8.9 g/dL      Hematocrit 27.8 %      MCV 94.9 fL      MCH 30.4 pg      MCHC 32.0 g/dL      RDW 12.8 %      RDW-SD 44.2 fl      MPV 9.6 fL      Platelets 227 10*3/mm3      Neutrophil % 52.5 %      Lymphocyte % 30.2 %      Monocyte % 8.8 %      Eosinophil % 7.0 %      Basophil % 1.5 %      Immature Grans % 0.0 %      Neutrophils, Absolute 2.09 10*3/mm3      Lymphocytes, Absolute 1.20 10*3/mm3      Monocytes, Absolute 0.35 10*3/mm3      Eosinophils, Absolute 0.28 10*3/mm3      Basophils, Absolute 0.06 10*3/mm3      Immature Grans, Absolute 0.00 10*3/mm3      nRBC 0.0 /100 WBC             PENDING RESULTS:     IMAGING REVIEWED:  No radiology results for the last day    Assessment & Plan     ASSESSMENT:    History of retinal artery occlusion, 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 Hemoglobin 8.9g/dl. He now has brown stools    PLAN:  Continue Lovenox  Monitor CBC next 1 to 4 hours  Advanced GI to see him to discuss if definitely colonoscopy is needed  Patient has received 1 dose of IV iron by GI  He had colonoscopy approximately 1 and half years ago that was not abnormal.  CBC in the morning  Reviewed his EGD findings which showed minimal gastritis otherwise unremarkable  Continue to monitor his CBCs.    For now we will use Lovenox for anticoagulation continues hemoglobin stabilizes.    All  questions answered   Discussed with primary        Electronically signed by Xena Peralta MD, 07/06/25, 2:35 PM EDT.                     Electronically signed by Xena Peralta MD at 07/06/25 1435       Kim TapiaSTEVEN at 07/06/25 1005               LOS: 0 days   Patient Care Team:  Aristeo Rodriguez MD as PCP - General (Family Medicine)  Aristeo Rodriguez MD as PCP - Family Medicine  Aristeo Rodriguez MD    Subjective     Patient denies any complaints    Review of Systems   Constitutional:  Positive for activity change.   HENT: Negative.     Respiratory: Negative.     Cardiovascular: Negative.    Gastrointestinal: Negative.    Genitourinary: Negative.    Musculoskeletal: Negative.    Neurological:  Positive for weakness.   Psychiatric/Behavioral: Negative.             Objective     Vital Signs  Temp:  [97.5 °F (36.4 °C)-98.3 °F (36.8 °C)] 97.5 °F (36.4 °C)  Heart Rate:  [71-84] 83  Resp:  [16-19] 19  BP: (124-135)/(67-79) 124/69      Physical Exam  Vitals reviewed.   Constitutional:       Appearance: He is not ill-appearing.   HENT:      Head: Normocephalic and atraumatic.      Right Ear: External ear normal.      Left Ear: External ear normal.      Nose: Nose normal.   Eyes:      General:         Right eye: No discharge.         Left eye: No discharge.   Cardiovascular:      Rate and Rhythm: Normal rate and regular rhythm.      Pulses: Normal pulses.      Heart sounds: Normal heart sounds.   Pulmonary:      Effort: Pulmonary effort is normal.      Breath sounds: Normal breath sounds.   Abdominal:      General: Bowel sounds are normal.      Palpations: Abdomen is soft.   Musculoskeletal:         General: Normal range of motion.      Cervical back: Normal range of motion.   Skin:     General: Skin is warm and dry.   Neurological:      Mental Status: He is alert and oriented to person, place, and time.   Psychiatric:         Behavior: Behavior normal.              Results Review:    Lab Results (last  24 hours)       Procedure Component Value Units Date/Time    CBC & Differential [[id number]]  (Abnormal) Collected: 07/07/25 0535    Specimen: Blood from Arm, Left Updated: 07/07/25 0604    Narrative:      The following orders were created for panel order CBC & Differential.  Procedure                               Abnormality         Status                     ---------                               -----------         ------                     CBC Auto Differential[[id number]]        Abnormal            Final result                 Please view results for these tests on the individual orders.    CBC Auto Differential [[id number]]  (Abnormal) Collected: 07/07/25 0535    Specimen: Blood from Arm, Left Updated: 07/07/25 0604     WBC 4.87 10*3/mm3      RBC 3.67 10*6/mm3      Hemoglobin 11.1 g/dL      Hematocrit 33.9 %      MCV 92.4 fL      MCH 30.2 pg      MCHC 32.7 g/dL      RDW 12.8 %      RDW-SD 42.9 fl      MPV 9.3 fL      Platelets 285 10*3/mm3      Neutrophil % 50.4 %      Lymphocyte % 32.2 %      Monocyte % 9.2 %      Eosinophil % 6.4 %      Basophil % 1.6 %      Immature Grans % 0.2 %      Neutrophils, Absolute 2.45 10*3/mm3      Lymphocytes, Absolute 1.57 10*3/mm3      Monocytes, Absolute 0.45 10*3/mm3      Eosinophils, Absolute 0.31 10*3/mm3      Basophils, Absolute 0.08 10*3/mm3      Immature Grans, Absolute 0.01 10*3/mm3      nRBC 0.0 /100 WBC     Basic Metabolic Panel [[id number]] Collected: 07/07/25 0535    Specimen: Blood from Arm, Left Updated: 07/07/25 0542    Basic Metabolic Panel [[id number]]  (Abnormal) Collected: 07/06/25 0526    Specimen: Blood Updated: 07/06/25 0606     Glucose 91 mg/dL      BUN 9.9 mg/dL      Creatinine 1.04 mg/dL      Sodium 141 mmol/L      Potassium 3.5 mmol/L      Comment: Specimen hemolyzed.  Result may be falsely elevated.        Chloride 111 mmol/L      CO2 21.1 mmol/L      Calcium 7.3 mg/dL      BUN/Creatinine Ratio 9.5     Anion Gap 8.9 mmol/L      eGFR 81.7 mL/min/1.73      Narrative:      GFR Categories in Chronic Kidney Disease (CKD)              GFR Category          GFR (mL/min/1.73)    Interpretation  G1                    90 or greater        Normal or high (1)  G2                    60-89                Mild decrease (1)  G3a                   45-59                Mild to moderate decrease  G3b                   30-44                Moderate to severe decrease  G4                    15-29                Severe decrease  G5                    14 or less           Kidney failure    (1)In the absence of evidence of kidney disease, neither GFR category G1 or G2 fulfill the criteria for CKD.    eGFR calculation 2021 CKD-EPI creatinine equation, which does not include race as a factor             Imaging Results (Last 24 Hours)       ** No results found for the last 24 hours. **                 I reviewed the patient's new clinical results.    Medication Review:   Scheduled Meds:[Held by provider] aspirin, 81 mg, Oral, Daily  atorvastatin, 80 mg, Oral, Nightly  desvenlafaxine, 50 mg, Oral, Daily  enoxaparin sodium, 1 mg/kg, Subcutaneous, Q12H  pantoprazole, 40 mg, Intravenous, BID AC  sodium chloride, 500 mL, Intravenous, Once  sodium chloride, 10 mL, Intravenous, Q12H      Continuous Infusions:Pharmacy to Dose enoxaparin (LOVENOX),       PRN Meds:.  acetaminophen **OR** acetaminophen **OR** acetaminophen    Calcium Replacement - Follow Nurse / BPA Driven Protocol    LORazepam    Magnesium Standard Dose Replacement - Follow Nurse / BPA Driven Protocol    nitroglycerin    ondansetron    Pharmacy to Dose enoxaparin (LOVENOX)    Phosphorus Replacement - Follow Nurse / BPA Driven Protocol    Potassium Replacement - Follow Nurse / BPA Driven Protocol    sodium chloride    sodium chloride     Interval History:    Assessment & Plan     Melena  Laryngopharyngeal reflux (LPR)  Essential hypertension  Retinal artery occlusion  Anxiety    Plan of care  Patient had EGD with minimal erythema in  the stomach antrum. One area that appeared to be healed ulcer/erosion in the antrum. Otherwise no ulcers or erosions were seen. No blood in the stomach. Recommendation PPI avoid NSAID. Continue lovenox labs in am    Plan for disposition:Home    STEVEN Watson  07/07/25  06:05 EDT          Electronically signed by Kim Tapia APRN at 07/07/25 0606       Kim Tapia APRN at 07/05/25 1327       Attestation signed by Cierra Cueva MD at 07/05/25 6253      I have reviewed this documentation and agree.                           LOS: 0 days   Patient Care Team:  Aristeo Rodriguez MD as PCP - General (Family Medicine)  Aristeo Rodriguez MD as PCP - Family Medicine  Aristeo Rodriguez MD    Subjective     Patient denies any complaints    Review of Systems   Constitutional:  Positive for activity change.   HENT: Negative.     Respiratory: Negative.     Cardiovascular: Negative.    Gastrointestinal: Negative.    Genitourinary: Negative.    Musculoskeletal: Negative.    Neurological:  Positive for weakness.   Psychiatric/Behavioral: Negative.             Objective     Vital Signs  Temp:  [97.3 °F (36.3 °C)-97.8 °F (36.6 °C)] 97.3 °F (36.3 °C)  Heart Rate:  [56-78] 56  Resp:  [13-19] 16  BP: (101-133)/(63-78) 133/78      Physical Exam  Vitals reviewed.   Constitutional:       Appearance: He is not ill-appearing.   HENT:      Head: Normocephalic and atraumatic.      Right Ear: External ear normal.      Left Ear: External ear normal.      Nose: Nose normal.   Eyes:      General:         Right eye: No discharge.         Left eye: No discharge.   Cardiovascular:      Rate and Rhythm: Normal rate and regular rhythm.      Pulses: Normal pulses.      Heart sounds: Normal heart sounds.   Pulmonary:      Effort: Pulmonary effort is normal.      Breath sounds: Normal breath sounds.   Abdominal:      General: Bowel sounds are normal.      Palpations: Abdomen is soft.   Musculoskeletal:         General: Normal range of motion.       Cervical back: Normal range of motion.   Skin:     General: Skin is warm and dry.   Neurological:      Mental Status: He is alert and oriented to person, place, and time.   Psychiatric:         Behavior: Behavior normal.              Results Review:    Lab Results (last 24 hours)       Procedure Component Value Units Date/Time    Basic Metabolic Panel [[id number]]  (Abnormal) Collected: 07/05/25 0042    Specimen: Blood Updated: 07/05/25 0114     Glucose 122 mg/dL      BUN 13.5 mg/dL      Creatinine 1.45 mg/dL      Sodium 141 mmol/L      Potassium 4.1 mmol/L      Chloride 107 mmol/L      CO2 25.0 mmol/L      Calcium 8.4 mg/dL      BUN/Creatinine Ratio 9.3     Anion Gap 9.0 mmol/L      eGFR 54.8 mL/min/1.73     Narrative:      GFR Categories in Chronic Kidney Disease (CKD)              GFR Category          GFR (mL/min/1.73)    Interpretation  G1                    90 or greater        Normal or high (1)  G2                    60-89                Mild decrease (1)  G3a                   45-59                Mild to moderate decrease  G3b                   30-44                Moderate to severe decrease  G4                    15-29                Severe decrease  G5                    14 or less           Kidney failure    (1)In the absence of evidence of kidney disease, neither GFR category G1 or G2 fulfill the criteria for CKD.    eGFR calculation 2021 CKD-EPI creatinine equation, which does not include race as a factor    Protime-INR [[id number]]  (Normal) Collected: 07/05/25 0042    Specimen: Blood Updated: 07/05/25 0059     Protime 13.5 Seconds      INR 1.04    CBC & Differential [[id number]]  (Abnormal) Collected: 07/05/25 0042    Specimen: Blood Updated: 07/05/25 0052    Narrative:      The following orders were created for panel order CBC & Differential.  Procedure                               Abnormality         Status                     ---------                               -----------         ------                      CBC Auto Differential[[id number]]        Abnormal            Final result                 Please view results for these tests on the individual orders.    CBC Auto Differential [[id number]]  (Abnormal) Collected: 07/05/25 0042    Specimen: Blood Updated: 07/05/25 0052     WBC 4.87 10*3/mm3      RBC 3.29 10*6/mm3      Hemoglobin 9.9 g/dL      Hematocrit 31.2 %      MCV 94.8 fL      MCH 30.1 pg      MCHC 31.7 g/dL      RDW 12.7 %      RDW-SD 44.1 fl      MPV 9.3 fL      Platelets 259 10*3/mm3      Neutrophil % 53.4 %      Lymphocyte % 31.2 %      Monocyte % 8.2 %      Eosinophil % 6.0 %      Basophil % 1.0 %      Immature Grans % 0.2 %      Neutrophils, Absolute 2.60 10*3/mm3      Lymphocytes, Absolute 1.52 10*3/mm3      Monocytes, Absolute 0.40 10*3/mm3      Eosinophils, Absolute 0.29 10*3/mm3      Basophils, Absolute 0.05 10*3/mm3      Immature Grans, Absolute 0.01 10*3/mm3      nRBC 0.0 /100 WBC              Imaging Results (Last 24 Hours)       ** No results found for the last 24 hours. **                 I reviewed the patient's new clinical results.    Medication Review:   Scheduled Meds:[Held by provider] aspirin, 81 mg, Oral, Daily  atorvastatin, 80 mg, Oral, Nightly  desvenlafaxine, 50 mg, Oral, Daily  pantoprazole, 40 mg, Intravenous, BID AC  sodium chloride, 500 mL, Intravenous, Once  sodium chloride, 10 mL, Intravenous, Q12H      Continuous Infusions:Pharmacy to Dose enoxaparin (LOVENOX),   sodium chloride, 100 mL/hr      PRN Meds:.  acetaminophen **OR** acetaminophen **OR** acetaminophen    Calcium Replacement - Follow Nurse / BPA Driven Protocol    LORazepam    Magnesium Standard Dose Replacement - Follow Nurse / BPA Driven Protocol    nitroglycerin    ondansetron    Pharmacy to Dose enoxaparin (LOVENOX)    Phosphorus Replacement - Follow Nurse / BPA Driven Protocol    Potassium Replacement - Follow Nurse / BPA Driven Protocol    sodium chloride    sodium chloride     Interval  History:    Assessment & Plan     Melena  Laryngopharyngeal reflux (LPR)  Essential hypertension  Retinal artery occlusion  Anxiety    Plan of care  Patient had EGD with minimal erythema in the stomach antrum. One area that appeared to be healed ulcer/erosion in the antrum. Otherwise no ulcers or erosions were seen. No blood in the stomach. Recommendation PPI avoid NSAID. Spoke with Dr Peralta, recommendation keep overnight check labs in the am and use lovenox for anticoagulation.     Plan for disposition:Home    STEVEN Watson  07/05/25  13:27 EDT          Electronically signed by Cierra Cueva MD at 07/05/25 2944

## 2025-07-07 NOTE — PROGRESS NOTES
LOS: 0 days   Patient Care Team:  Aristeo Rodriguez MD as PCP - General (Family Medicine)  Aristeo Rodriguez MD as PCP - Family Medicine  Aristeo Rodriguez MD    Subjective     Patient denies any complaints    Review of Systems   Constitutional:  Positive for activity change.   HENT: Negative.     Respiratory: Negative.     Cardiovascular: Negative.    Gastrointestinal: Negative.    Genitourinary: Negative.    Musculoskeletal: Negative.    Neurological:  Positive for weakness.   Psychiatric/Behavioral: Negative.             Objective     Vital Signs  Temp:  [97.5 °F (36.4 °C)-98.3 °F (36.8 °C)] 97.5 °F (36.4 °C)  Heart Rate:  [71-84] 83  Resp:  [16-19] 19  BP: (124-135)/(67-79) 124/69      Physical Exam  Vitals reviewed.   Constitutional:       Appearance: He is not ill-appearing.   HENT:      Head: Normocephalic and atraumatic.      Right Ear: External ear normal.      Left Ear: External ear normal.      Nose: Nose normal.   Eyes:      General:         Right eye: No discharge.         Left eye: No discharge.   Cardiovascular:      Rate and Rhythm: Normal rate and regular rhythm.      Pulses: Normal pulses.      Heart sounds: Normal heart sounds.   Pulmonary:      Effort: Pulmonary effort is normal.      Breath sounds: Normal breath sounds.   Abdominal:      General: Bowel sounds are normal.      Palpations: Abdomen is soft.   Musculoskeletal:         General: Normal range of motion.      Cervical back: Normal range of motion.   Skin:     General: Skin is warm and dry.   Neurological:      Mental Status: He is alert and oriented to person, place, and time.   Psychiatric:         Behavior: Behavior normal.              Results Review:    Lab Results (last 24 hours)       Procedure Component Value Units Date/Time    CBC & Differential [[id number]]  (Abnormal) Collected: 07/07/25 0535    Specimen: Blood from Arm, Left Updated: 07/07/25 0604    Narrative:      The following orders were created for panel order CBC &  Differential.  Procedure                               Abnormality         Status                     ---------                               -----------         ------                     CBC Auto Differential[[id number]]        Abnormal            Final result                 Please view results for these tests on the individual orders.    CBC Auto Differential [[id number]]  (Abnormal) Collected: 07/07/25 0535    Specimen: Blood from Arm, Left Updated: 07/07/25 0604     WBC 4.87 10*3/mm3      RBC 3.67 10*6/mm3      Hemoglobin 11.1 g/dL      Hematocrit 33.9 %      MCV 92.4 fL      MCH 30.2 pg      MCHC 32.7 g/dL      RDW 12.8 %      RDW-SD 42.9 fl      MPV 9.3 fL      Platelets 285 10*3/mm3      Neutrophil % 50.4 %      Lymphocyte % 32.2 %      Monocyte % 9.2 %      Eosinophil % 6.4 %      Basophil % 1.6 %      Immature Grans % 0.2 %      Neutrophils, Absolute 2.45 10*3/mm3      Lymphocytes, Absolute 1.57 10*3/mm3      Monocytes, Absolute 0.45 10*3/mm3      Eosinophils, Absolute 0.31 10*3/mm3      Basophils, Absolute 0.08 10*3/mm3      Immature Grans, Absolute 0.01 10*3/mm3      nRBC 0.0 /100 WBC     Basic Metabolic Panel [[id number]] Collected: 07/07/25 0535    Specimen: Blood from Arm, Left Updated: 07/07/25 0542    Basic Metabolic Panel [[id number]]  (Abnormal) Collected: 07/06/25 0526    Specimen: Blood Updated: 07/06/25 0606     Glucose 91 mg/dL      BUN 9.9 mg/dL      Creatinine 1.04 mg/dL      Sodium 141 mmol/L      Potassium 3.5 mmol/L      Comment: Specimen hemolyzed.  Result may be falsely elevated.        Chloride 111 mmol/L      CO2 21.1 mmol/L      Calcium 7.3 mg/dL      BUN/Creatinine Ratio 9.5     Anion Gap 8.9 mmol/L      eGFR 81.7 mL/min/1.73     Narrative:      GFR Categories in Chronic Kidney Disease (CKD)              GFR Category          GFR (mL/min/1.73)    Interpretation  G1                    90 or greater        Normal or high (1)  G2                    60-89                Mild decrease  (1)  G3a                   45-59                Mild to moderate decrease  G3b                   30-44                Moderate to severe decrease  G4                    15-29                Severe decrease  G5                    14 or less           Kidney failure    (1)In the absence of evidence of kidney disease, neither GFR category G1 or G2 fulfill the criteria for CKD.    eGFR calculation 2021 CKD-EPI creatinine equation, which does not include race as a factor             Imaging Results (Last 24 Hours)       ** No results found for the last 24 hours. **                 I reviewed the patient's new clinical results.    Medication Review:   Scheduled Meds:[Held by provider] aspirin, 81 mg, Oral, Daily  atorvastatin, 80 mg, Oral, Nightly  desvenlafaxine, 50 mg, Oral, Daily  enoxaparin sodium, 1 mg/kg, Subcutaneous, Q12H  pantoprazole, 40 mg, Intravenous, BID AC  sodium chloride, 500 mL, Intravenous, Once  sodium chloride, 10 mL, Intravenous, Q12H      Continuous Infusions:Pharmacy to Dose enoxaparin (LOVENOX),       PRN Meds:.  acetaminophen **OR** acetaminophen **OR** acetaminophen    Calcium Replacement - Follow Nurse / BPA Driven Protocol    LORazepam    Magnesium Standard Dose Replacement - Follow Nurse / BPA Driven Protocol    nitroglycerin    ondansetron    Pharmacy to Dose enoxaparin (LOVENOX)    Phosphorus Replacement - Follow Nurse / BPA Driven Protocol    Potassium Replacement - Follow Nurse / BPA Driven Protocol    sodium chloride    sodium chloride     Interval History:    Assessment & Plan     Melena  Laryngopharyngeal reflux (LPR)  Essential hypertension  Retinal artery occlusion  Anxiety    Plan of care  Patient had EGD with minimal erythema in the stomach antrum. One area that appeared to be healed ulcer/erosion in the antrum. Otherwise no ulcers or erosions were seen. No blood in the stomach. Recommendation PPI avoid NSAID. Continue lovenox labs in am    Plan for disposition:Home    [place]  STEVEN Tapia  07/07/25  06:05 EDT

## 2025-07-07 NOTE — PLAN OF CARE
Goal Outcome Evaluation:  Plan of Care Reviewed With: patient           Outcome Evaluation: Pt able to make needs known. Pt w/o complaints of pain. Pt up ad donna to the bathroom. VSS. Call light in reach. Plan of care ongoing.

## 2025-07-07 NOTE — CASE MANAGEMENT/SOCIAL WORK
Discharge Planning Assessment   [place]     Patient Name: Flip Vang  MRN: [id number]  Today's Date: 7/7/2025    Admit Date: 7/3/2025    Plan: D/C Plan: Return home with s/o. Family transport.   Discharge Needs Assessment       Row Name 07/07/25 1113       Living Environment    People in Home significant other    Name(s) of People in Home Jimena    Current Living Arrangements home    Potentially Unsafe Housing Conditions none    In the past 12 months has the electric, gas, oil, or water company threatened to shut off services in your home? No    Primary Care Provided by self    Provides Primary Care For no one    Family Caregiver if Needed significant other    Family Caregiver Names Jimena, Debbie, Lee    Quality of Family Relationships helpful;involved;supportive    Able to Return to Prior Arrangements yes       Resource/Environmental Concerns    Resource/Environmental Concerns none    Transportation Concerns none       Transportation Needs    In the past 12 months, has lack of transportation kept you from medical appointments or from getting medications? no    In the past 12 months, has lack of transportation kept you from meetings, work, or from getting things needed for daily living? No       Food Insecurity    Within the past 12 months, you worried that your food would run out before you got the money to buy more. Never true    Within the past 12 months, the food you bought just didn't last and you didn't have money to get more. Never true       Transition Planning    Patient/Family Anticipates Transition to home with family    Patient/Family Anticipated Services at Transition none    Transportation Anticipated family or friend will provide       Discharge Needs Assessment    Readmission Within the Last 30 Days no previous admission in last 30 days    Equipment Currently Used at Home bp cuff    Concerns to be Addressed denies needs/concerns at this time    Do you want help finding or keeping work or a  job? I do not need or want help    Do you want help with school or training? For example, starting or completing job training or getting a high school diploma, GED or equivalent No    Anticipated Changes Related to Illness none    Equipment Needed After Discharge none    Provided Post Acute Provider List? N/A    Provided Post Acute Provider Quality & Resource List? N/A                   Discharge Plan       Row Name 07/07/25 1116       Plan    Plan D/C Plan: Return home with s/o. Family transport.    Provided Post Acute Provider List? N/A    Provided Post Acute Provider Quality & Resource List? N/A    Plan Comments CM spoke with patient at bedside to discuss admission assessment and discharge planning. Patient is oriented x4. Patient confirms PCP and pharmacy. Patient already enrolled in meds to bed program. Patient denies any difficulty affording medications at this time. Patient denies any additional needs for services or DME at this time. D/c barriers: IV protonix             Expected Discharge Date and Time       Expected Discharge Date Expected Discharge Time    Jul 7, 2025            Demographic Summary       Row Name 07/07/25 1112       General Information    Admission Type observation    Arrived From emergency department    Referral Source admission list    Reason for Consult discharge planning    Preferred Language English       Contact Information    Permission Granted to Share Info With                    Functional Status       Row Name 07/07/25 1112       Functional Status    Usual Activity Tolerance good    Current Activity Tolerance good       Functional Status, IADL    Medications independent    Meal Preparation independent    Housekeeping independent    Laundry independent    Shopping independent    If for any reason you need help with day-to-day activities such as bathing, preparing meals, shopping, managing finances, etc., do you get the help you need? I don't need any help        Mental Status    General Appearance WDL WDL       Mental Status Summary    Recent Changes in Mental Status/Cognitive Functioning no changes       Employment/    Employment Status , previous service;employed part-time           Current or Previous  Service none             Nilsa Brannon RN     [address] [address]  Phone: [phone]  Fax: [phone]

## 2025-07-07 NOTE — DISCHARGE SUMMARY
Date of Discharge:  7/7/2025    Discharge Diagnosis:   **Melena [K92.1]   Anxiety [F41.9]   Retinal artery occlusion [H34.9]   Laryngopharyngeal reflux (LPR) [K21.9]   Essential hypertension [I10]       Presenting Problem/History of Present Illness  Active Hospital Problems    Diagnosis  POA    **Melena [K92.1]  Yes    Anxiety [F41.9]  Yes    Retinal artery occlusion [H34.9]  Yes    Laryngopharyngeal reflux (LPR) [K21.9]  Yes    Essential hypertension [I10]  Yes      Resolved Hospital Problems   No resolved problems to display.          Hospital Course  Patient is a 60 y.o. male with history of lupus anticoagulant clotting disorder presented with melena.   Hemoglobin dropped from baseline of 12.5 to 8.9.  EGD showed minimal erythema and no source of bleeding.  Colonoscopy was not done as he had no active/brisk bleeding and scope 18 months ago was unremarkable.  Hemogloblin stabilized and was trending upward at time of discharge.  He will recheck CBC this week as outpatient.  He will resume Xarelto and PPI.  He has appropriate follow up with GI, cardiology, oncology and PCP.      Procedures Performed    Procedure(s):  ESOPHAGOGASTRODUODENOSCOPY  -------------------  07/05 1016 Upper GI Endoscopy    Consults:   Consults       Date and Time Order Name Status Description    7/5/2025  9:01 AM Hematology & Oncology Inpatient Consult Completed     7/3/2025  7:21 PM Inpatient Gastroenterology Consult Completed             Pertinent Test Results:    Lab Results (most recent)       Procedure Component Value Units Date/Time    Basic Metabolic Panel [[id number]]  (Abnormal) Collected: 07/07/25 0535    Specimen: Blood from Arm, Left Updated: 07/07/25 0609     Glucose 103 mg/dL      BUN 13.1 mg/dL      Creatinine 1.20 mg/dL      Sodium 140 mmol/L      Potassium 4.1 mmol/L      Chloride 106 mmol/L      CO2 24.1 mmol/L      Calcium 9.0 mg/dL      BUN/Creatinine Ratio 10.9     Anion Gap 9.9 mmol/L      eGFR 68.8 mL/min/1.73      Narrative:      GFR Categories in Chronic Kidney Disease (CKD)              GFR Category          GFR (mL/min/1.73)    Interpretation  G1                    90 or greater        Normal or high (1)  G2                    60-89                Mild decrease (1)  G3a                   45-59                Mild to moderate decrease  G3b                   30-44                Moderate to severe decrease  G4                    15-29                Severe decrease  G5                    14 or less           Kidney failure    (1)In the absence of evidence of kidney disease, neither GFR category G1 or G2 fulfill the criteria for CKD.    eGFR calculation 2021 CKD-EPI creatinine equation, which does not include race as a factor    CBC & Differential [[id number]]  (Abnormal) Collected: 07/07/25 0535    Specimen: Blood from Arm, Left Updated: 07/07/25 0604    Narrative:      The following orders were created for panel order CBC & Differential.  Procedure                               Abnormality         Status                     ---------                               -----------         ------                     CBC Auto Differential[[id number]]        Abnormal            Final result                 Please view results for these tests on the individual orders.    CBC Auto Differential [[id number]]  (Abnormal) Collected: 07/07/25 0535    Specimen: Blood from Arm, Left Updated: 07/07/25 0604     WBC 4.87 10*3/mm3      RBC 3.67 10*6/mm3      Hemoglobin 11.1 g/dL      Hematocrit 33.9 %      MCV 92.4 fL      MCH 30.2 pg      MCHC 32.7 g/dL      RDW 12.8 %      RDW-SD 42.9 fl      MPV 9.3 fL      Platelets 285 10*3/mm3      Neutrophil % 50.4 %      Lymphocyte % 32.2 %      Monocyte % 9.2 %      Eosinophil % 6.4 %      Basophil % 1.6 %      Immature Grans % 0.2 %      Neutrophils, Absolute 2.45 10*3/mm3      Lymphocytes, Absolute 1.57 10*3/mm3      Monocytes, Absolute 0.45 10*3/mm3      Eosinophils, Absolute 0.31 10*3/mm3       Basophils, Absolute 0.08 10*3/mm3      Immature Grans, Absolute 0.01 10*3/mm3      nRBC 0.0 /100 WBC     Basic Metabolic Panel [[id number]]  (Abnormal) Collected: 07/06/25 0526    Specimen: Blood Updated: 07/06/25 0606     Glucose 91 mg/dL      BUN 9.9 mg/dL      Creatinine 1.04 mg/dL      Sodium 141 mmol/L      Potassium 3.5 mmol/L      Comment: Specimen hemolyzed.  Result may be falsely elevated.        Chloride 111 mmol/L      CO2 21.1 mmol/L      Calcium 7.3 mg/dL      BUN/Creatinine Ratio 9.5     Anion Gap 8.9 mmol/L      eGFR 81.7 mL/min/1.73     Narrative:      GFR Categories in Chronic Kidney Disease (CKD)              GFR Category          GFR (mL/min/1.73)    Interpretation  G1                    90 or greater        Normal or high (1)  G2                    60-89                Mild decrease (1)  G3a                   45-59                Mild to moderate decrease  G3b                   30-44                Moderate to severe decrease  G4                    15-29                Severe decrease  G5                    14 or less           Kidney failure    (1)In the absence of evidence of kidney disease, neither GFR category G1 or G2 fulfill the criteria for CKD.    eGFR calculation 2021 CKD-EPI creatinine equation, which does not include race as a factor    CBC & Differential [[id number]]  (Abnormal) Collected: 07/06/25 0526    Specimen: Blood Updated: 07/06/25 0540    Narrative:      The following orders were created for panel order CBC & Differential.  Procedure                               Abnormality         Status                     ---------                               -----------         ------                     CBC Auto Differential[[id number]]        Abnormal            Final result                 Please view results for these tests on the individual orders.    CBC Auto Differential [[id number]]  (Abnormal) Collected: 07/06/25 0526    Specimen: Blood Updated: 07/06/25 0540     WBC 3.98 10*3/mm3       RBC 2.93 10*6/mm3      Hemoglobin 8.9 g/dL      Hematocrit 27.8 %      MCV 94.9 fL      MCH 30.4 pg      MCHC 32.0 g/dL      RDW 12.8 %      RDW-SD 44.2 fl      MPV 9.6 fL      Platelets 227 10*3/mm3      Neutrophil % 52.5 %      Lymphocyte % 30.2 %      Monocyte % 8.8 %      Eosinophil % 7.0 %      Basophil % 1.5 %      Immature Grans % 0.0 %      Neutrophils, Absolute 2.09 10*3/mm3      Lymphocytes, Absolute 1.20 10*3/mm3      Monocytes, Absolute 0.35 10*3/mm3      Eosinophils, Absolute 0.28 10*3/mm3      Basophils, Absolute 0.06 10*3/mm3      Immature Grans, Absolute 0.00 10*3/mm3      nRBC 0.0 /100 WBC     Protime-INR [[id number]]  (Normal) Collected: 07/05/25 0042    Specimen: Blood Updated: 07/05/25 0059     Protime 13.5 Seconds      INR 1.04    Ferritin [[id number]]  (Abnormal) Collected: 07/04/25 0446    Specimen: Blood Updated: 07/04/25 1216     Ferritin 16.10 ng/mL     Narrative:      Results may be falsely decreased if patient taking Biotin.      POC Glucose Once [[id number]]  (Normal) Collected: 07/04/25 1013    Specimen: Blood Updated: 07/04/25 1016     Glucose 86 mg/dL      Comment: Serial Number: [id number]:  [id number]       Reticulocytes [[id number]]  (Abnormal) Collected: 07/04/25 0446    Specimen: Blood Updated: 07/04/25 0905     Reticulocyte % 2.35 %      Reticulocyte Absolute 0.0757 10*6/mm3     Iron Profile w/o Ferritin [[id number]]  (Abnormal) Collected: 07/04/25 0446    Specimen: Blood Updated: 07/04/25 0903     Iron 18 mcg/dL      Iron Saturation (TSAT) 5 %      Transferrin 250 mg/dL      TIBC 373 mcg/dL     CBC (No Diff) [[id number]]  (Abnormal) Collected: 07/04/25 0446    Specimen: Blood Updated: 07/04/25 0501     WBC 4.48 10*3/mm3      RBC 3.24 10*6/mm3      Hemoglobin 9.7 g/dL      Hematocrit 30.4 %      MCV 93.8 fL      MCH 29.9 pg      MCHC 31.9 g/dL      RDW 12.8 %      RDW-SD 44.3 fl      MPV 9.3 fL      Platelets 239 10*3/mm3     Comprehensive Metabolic Panel [[id number]]   (Abnormal) Collected: 07/03/25 1652    Specimen: Blood Updated: 07/03/25 1720     Glucose 112 mg/dL      BUN 16.4 mg/dL      Creatinine 1.34 mg/dL      Sodium 139 mmol/L      Potassium 4.0 mmol/L      Chloride 102 mmol/L      CO2 25.5 mmol/L      Calcium 9.5 mg/dL      Total Protein 7.2 g/dL      Albumin 4.8 g/dL      ALT (SGPT) 21 U/L      AST (SGOT) 29 U/L      Alkaline Phosphatase 92 U/L      Total Bilirubin 0.5 mg/dL      Globulin 2.4 gm/dL      A/G Ratio 2.0 g/dL      BUN/Creatinine Ratio 12.2     Anion Gap 11.5 mmol/L      eGFR 60.3 mL/min/1.73     Narrative:      GFR Categories in Chronic Kidney Disease (CKD)              GFR Category          GFR (mL/min/1.73)    Interpretation  G1                    90 or greater        Normal or high (1)  G2                    60-89                Mild decrease (1)  G3a                   45-59                Mild to moderate decrease  G3b                   30-44                Moderate to severe decrease  G4                    15-29                Severe decrease  G5                    14 or less           Kidney failure    (1)In the absence of evidence of kidney disease, neither GFR category G1 or G2 fulfill the criteria for CKD.    eGFR calculation 2021 CKD-EPI creatinine equation, which does not include race as a factor    Urinalysis With Culture If Indicated - Urine, Clean Catch [[id number]]  (Abnormal) Collected: 07/03/25 1653    Specimen: Urine, Clean Catch Updated: 07/03/25 1708     Color, UA Yellow     Appearance, UA Clear     pH, UA 6.0     Specific Gravity, UA 1.018     Glucose, UA Negative     Ketones, UA Trace     Bilirubin, UA Negative     Blood, UA Negative     Protein, UA Negative     Leuk Esterase, UA Trace     Nitrite, UA Negative     Urobilinogen, UA 1.0 E.U./dL    Narrative:      In absence of clinical symptoms, the presence of pyuria, bacteria, and/or nitrites on the urinalysis result does not correlate with infection.    Urinalysis, Microscopic Only -  Urine, Clean Catch [[id number]] Collected: 07/03/25 1653    Specimen: Urine, Clean Catch Updated: 07/03/25 1708     RBC, UA 0-2 /HPF      WBC, UA 0-2 /HPF      Comment: Urine culture not indicated.        Bacteria, UA None Seen /HPF      Squamous Epithelial Cells, UA None Seen /HPF      Hyaline Casts, UA None Seen /LPF      Methodology Automated Microscopy    Extra Tubes [[id number]] Collected: 07/03/25 1652    Specimen: Blood, Venous Line Updated: 07/03/25 1700    Narrative:      The following orders were created for panel order Extra Tubes.  Procedure                               Abnormality         Status                     ---------                               -----------         ------                     Gold Top - SST[[id number]]                                   Final result               Light Blue Top[[id number]]                                   Final result                 Please view results for these tests on the individual orders.    Gold Top - SST [[id number]] Collected: 07/03/25 1652    Specimen: Blood Updated: 07/03/25 1700     Extra Tube Hold for add-ons.     Comment: Auto resulted.       Light Blue Top [[id number]] Collected: 07/03/25 1652    Specimen: Blood Updated: 07/03/25 1700     Extra Tube Hold for add-ons.     Comment: Auto resulted                Results for orders placed during the hospital encounter of 05/19/25    Adult Transesophageal Echo ([facility]) W/ Cont if Necessary Per Protocol (Cardiology Department) 05/30/2025 12:53 PM    Interpretation Summary    Left ventricular systolic function is normal. Left ventricular ejection fraction appears to be 56 - 60%.    Left ventricular diastolic function was normal.    Saline test results are negative.              Condition on Discharge:  Improved, stable    Vital Signs  Temp:  [97.5 °F (36.4 °C)-98.5 °F (36.9 °C)] 98.5 °F (36.9 °C)  Heart Rate:  [71-83] 78  Resp:  [16-19] 18  BP: (118-135)/(69-83) 118/83    Physical Exam:     General Appearance:     Alert, cooperative, in no acute distress   Head:    Normocephalic, without obvious abnormality, atraumatic   Eyes:            Lids and lashes normal, conjunctivae and sclerae normal, no   icterus, no pallor, corneas clear, PERRLA   Ears:    Ears appear intact with no abnormalities noted   Throat:   No oral lesions, no thrush, oral mucosa moist   Neck:   No adenopathy, supple, trachea midline, no thyromegaly, no   carotid bruit, no JVD   Lungs:     Clear to auscultation,respirations regular, even and                  unlabored    Heart:    Regular rhythm and normal rate, normal S1 and S2, no            murmur, no gallop, no rub, no click   Chest Wall:    No abnormalities observed   Abdomen:     Normal bowel sounds, no masses, no organomegaly, soft        non-tender, non-distended, no guarding, no rebound                tenderness   Extremities:   Moves all extremities well, no edema, no cyanosis, no             redness   Pulses:   Pulses palpable and equal bilaterally   Skin:   No bleeding, bruising or rash   Lymph nodes:   No palpable adenopathy   Neurologic:   Cranial nerves 2 - 12 grossly intact, sensation intact, DTR       present and equal bilaterally       Discharge Disposition  Home or Self Care    Discharge Medications     Discharge Medications        Continue These Medications        Instructions Start Date   acetaminophen 325 MG tablet  Commonly known as: TYLENOL   650 mg, Every 6 Hours PRN      aspirin 81 MG EC tablet   81 mg, Oral, Daily      atorvastatin 80 MG tablet  Commonly known as: LIPITOR   80 mg, Oral, Nightly      desvenlafaxine 50 MG 24 hr tablet  Commonly known as: PRISTIQ   1 tablet, Daily      LORazepam 0.5 MG tablet  Commonly known as: ATIVAN   TAKE 1 OR 2 TABLETS BY MOUTH 2 TIMES A DAY AS NEEDED      omeprazole 40 MG capsule  Commonly known as: priLOSEC   40 mg, Daily      rivaroxaban 20 MG tablet  Commonly known as: XARELTO   20 mg, Nightly               Discharge Diet:   Diet Instructions        Diet: Regular/House Diet; Regular (IDDSI 7); Thin (IDDSI 0)      Discharge Diet: Regular/House Diet    Texture: Regular (IDDSI 7)    Fluid Consistency: Thin (IDDSI 0)            Activity at Discharge:   Activity Instructions       Gradually Increase Activity Until at Pre-Hospitalization Level              Follow-up Appointments  Future Appointments   Date Time Provider Department Center   8/1/2025  1:15 PM Richard Moore DO [facility]   8/11/2025 11:00 AM Sidney Knott MD [facility]   9/4/2025  2:30 PM [facility]   9/11/2025  1:15 PM Gonzalo Kearney MD [facility]   9/11/2025  1:20 PM [facility]     Additional Instructions for the Follow-ups that You Need to Schedule       Discharge Follow-up with PCP   As directed       Currently Documented PCP:    Aristeo Rodriguez MD    PCP Phone Number:    [phone]     Follow Up Details: 2 weeks        Discharge Follow-up with PCP   As directed       Currently Documented PCP:    Aristeo Rodriguez MD    PCP Phone Number:    [phone]     Follow Up Details: Call office to request a hospital follow up appointment.        Discharge Follow-up with Specified Provider: Cardiologist - keep scheduled appointment   As directed      To: Cardiologist - keep scheduled appointment        Discharge Follow-up with Specified Provider: Gastroenterology - keep scheduled appointment.   As directed      To: Gastroenterology - keep scheduled appointment.        Discharge Follow-up with Specified Provider: Oncology - keep scheduled appointment   As directed      To: Oncology - keep scheduled appointment        CBC (No Diff)    Jul 12, 2025 (Approximate)      Cbc in one week  CBC in two weeks    Order Comments: Cbc in one week CBC in two weeks    Release to patient: Routine Release                Test Results Pending at Discharge  Pending Results       None             Cierra Cueva MD  07/07/25  11:22  EDT    Time: Discharge 25 min

## 2025-07-07 NOTE — CASE MANAGEMENT/SOCIAL WORK
Case Management Discharge Note           Provided Post Acute Provider List?: N/A  Provided Post Acute Provider Quality & Resource List?: N/A    Transportation Services  Transportation: Private Transportation  Private: Car    Final Discharge Disposition Code: 01 - home or self-care

## 2025-07-07 NOTE — PROGRESS NOTES
LOS: 0 days   Patient Care Team:  Aristeo Rodriguez MD as PCP - General (Family Medicine)  Aristeo Rodriguez MD as PCP - Family Medicine  Aristeo Rodriguez MD      Subjective     Interval History:     Subjective: Patient reports feeling very well.  Reports tolerating regular diet with no abdominal pain, nausea, or vomiting.  Reports x 1 brown BM yesterday.  Denies BRBPR or melena.      ROS:   No chest pain, shortness of breath, or cough.        Medication Review:     Current Facility-Administered Medications:     acetaminophen (TYLENOL) tablet 650 mg, 650 mg, Oral, Q4H PRN, 650 mg at 07/05/25 0021 **OR** acetaminophen (TYLENOL) 160 MG/5ML oral solution 650 mg, 650 mg, Oral, Q4H PRN **OR** acetaminophen (TYLENOL) suppository 650 mg, 650 mg, Rectal, Q4H PRN, YG Bettencourt MD    [Held by provider] aspirin EC tablet 81 mg, 81 mg, Oral, Daily, Cierra Cueva MD    atorvastatin (LIPITOR) tablet 80 mg, 80 mg, Oral, Nightly, Kim Tapia APRN, 80 mg at 07/06/25 2051    Calcium Replacement - Follow Nurse / BPA Driven Protocol, , Not Applicable, PRN, YG Bettencourt MD    desvenlafaxine (PRISTIQ) 24 hr tablet 50 mg, 50 mg, Oral, Daily, YG Bettencourt MD, 50 mg at 07/07/25 0800    enoxaparin sodium (LOVENOX) syringe 80 mg, 1 mg/kg, Subcutaneous, Q12H, Kim Tapia APRN, 80 mg at 07/07/25 0547    LORazepam (ATIVAN) tablet 0.5 mg, 0.5 mg, Oral, Q6H PRN, YG Bettencourt MD, 0.5 mg at 07/06/25 2051    Magnesium Standard Dose Replacement - Follow Nurse / BPA Driven Protocol, , Not Applicable, PRN, YG Bettencourt MD    nitroglycerin (NITROSTAT) SL tablet 0.4 mg, 0.4 mg, Sublingual, Q5 Min PRN, YG Bettencourt MD    ondansetron (ZOFRAN) injection 4 mg, 4 mg, Intravenous, Q6H PRN, YG Bettencourt MD    pantoprazole (PROTONIX) injection 40 mg, 40 mg, Intravenous, BID AC, YG Bettencourt MD, 40 mg at 07/07/25 0801    Pharmacy to Dose enoxaparin (LOVENOX), , Not Applicable, Continuous PRN, Kim Tapia, APRN     Phosphorus Replacement - Follow Nurse / BPA Driven Protocol, , Not Applicable, Rut LAI R Tyler, MD    Potassium Replacement - Follow Nurse / BPA Driven Protocol, , Not Applicable, Rut LAI R Tyler, MD    sodium chloride 0.9 % bolus 500 mL, 500 mL, Intravenous, Once, Willie Kim M, APRN, Held at 07/05/25 1405    sodium chloride 0.9 % flush 10 mL, 10 mL, Intravenous, Q12H, YG Bettencourt MD, 10 mL at 07/07/25 0801    sodium chloride 0.9 % flush 10 mL, 10 mL, Intravenous, PRN, YG Bettencourt MD    sodium chloride 0.9 % infusion 40 mL, 40 mL, Intravenous, Rut LAI R Tyler, MD      Objective     Vital Signs  Vitals:    07/06/25 1210 07/06/25 1654 07/07/25 0039 07/07/25 0823   BP: 125/71 135/79 124/69 118/83   BP Location: Left arm Left arm Left arm Right arm   Patient Position: Sitting Sitting Lying Lying   Pulse: 75 71 83 78   Resp: 16 16 19 18   Temp: 98.3 °F (36.8 °C) 97.9 °F (36.6 °C) 97.5 °F (36.4 °C) 98.5 °F (36.9 °C)   TempSrc: Oral Oral Oral Oral   SpO2: 95% 99% 98% 97%   Weight:       Height:           Physical Exam:     General Appearance:    Awake and alert, in no acute distress, sitting in bed eating breakfast   Head:    Normocephalic, without obvious abnormality   Eyes:          Conjunctivae normal, anicteric sclera       Neck:   no JVD   Lungs:     respirations regular, even and unlabored, room air   Abdomen:     Soft, non-tender, no rebound or guarding, non-distended       Extremities:   No edema, no cyanosis   Skin:   No bruising or rash, no jaundice        Results Review:    Results from last 7 days   Lab Units 07/07/25  0535 07/06/25  0526 07/05/25  0042 07/04/25  0446 07/03/25  1652   WBC 10*3/mm3 4.87 3.98 4.87 4.48 5.97   HEMOGLOBIN g/dL 11.1* 8.9* 9.9* 9.7* 10.7*   PLATELETS 10*3/mm3 285 227 259 239 282     Results from last 7 days   Lab Units 07/07/25  0535 07/06/25  0526 07/05/25  0042 07/04/25  0446 07/03/25  1652   SODIUM mmol/L 140 141 141 142 139   POTASSIUM mmol/L 4.1  3.5 4.1 4.1 4.0   CHLORIDE mmol/L 106 111* 107 107 102   CO2 mmol/L 24.1 21.1* 25.0 24.9 25.5   BUN mg/dL 13.1 9.9 13.5 13.8 16.4   CREATININE mg/dL 1.20 1.04 1.45* 1.20 1.34*   GLUCOSE mg/dL 103* 91 122* 103* 112*   ALBUMIN g/dL  --   --   --   --  4.8   BILIRUBIN mg/dL  --   --   --   --  0.5   ALK PHOS U/L  --   --   --   --  92   AST (SGOT) U/L  --   --   --   --  29   ALT (SGPT) U/L  --   --   --   --  21   INR   --   --  1.04  --   --      Estimated Creatinine Clearance: 70.2 mL/min (by C-G formula based on SCr of 1.2 mg/dL).  Lab Results   Component Value Date    HGBA1C 5.37 05/19/2025         Infection     UA    Results from last 7 days   Lab Units 07/03/25  1653   NITRITE UA  Negative   WBC UA /HPF 0-2   BACTERIA UA /HPF None Seen   SQUAM EPITHEL UA /HPF None Seen     Microbiology Results (last 10 days)       ** No results found for the last 240 hours. **          Imaging Results (Last 72 Hours)       ** No results found for the last 72 hours. **            Assessment & Plan       60 y.o. male with PMH +Lupus antioag on xarelto (followed by Dr. 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 He presented to the ER due to melena at the recommendation of his vascular surgeon.  GI was consulted for melena.  He was recently started on Xarelto approximately 1 month ago.     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  1 area that appeared to be healed ulcer/erosion in the antrum.  No blood in the stomach.  Normal duodenum.  Normal esophagus.     PLAN:  - Hemoglobin 11.1 from 8.9.  Likely hemodilutional due to 2 L bolus for JACKLYN.  No signs of overt GI bleeding.  - Okay to discharge from GI standpoint.  - Continue diet as tolerated.  - Per patient, he is to continue Xarelto  for at least 12 weeks.  - Supportive care with antiemetics analgesics as needed.      Electronically signed by STEVEN Orellana, 07/07/25, 9:54 AM EDT.

## 2025-07-08 ENCOUNTER — READMISSION MANAGEMENT (OUTPATIENT)
Dept: CALL CENTER | Facility: HOSPITAL | Age: 60
End: 2025-07-08
Payer: COMMERCIAL

## 2025-07-08 NOTE — OUTREACH NOTE
Prep Survey      Flowsheet Row Responses   [facility] patient discharged from? [facility]   Is LACE score < 7 ? No   Eligibility Readm Mgmt   Discharge diagnosis *Melena   Does the patient have one of the following disease processes/diagnoses(primary or secondary)? Other   Does the patient have Home health ordered? No   Is there a DME ordered? No   Prep survey completed? Yes            MENG GIRALDO - Registered Nurse

## 2025-07-10 ENCOUNTER — LAB (OUTPATIENT)
Dept: LAB | Facility: HOSPITAL | Age: 60
End: 2025-07-10
Payer: COMMERCIAL

## 2025-07-10 DIAGNOSIS — R19.5 DARK STOOLS: ICD-10-CM

## 2025-07-10 LAB
DEPRECATED RDW RBC AUTO: 44.6 FL (ref 37–54)
ERYTHROCYTE [DISTWIDTH] IN BLOOD BY AUTOMATED COUNT: 13 % (ref 12.3–15.4)
HCT VFR BLD AUTO: 33 % (ref 37.5–51)
HGB BLD-MCNC: 10.6 G/DL (ref 13–17.7)
MCH RBC QN AUTO: 30.5 PG (ref 26.6–33)
MCHC RBC AUTO-ENTMCNC: 32.1 G/DL (ref 31.5–35.7)
MCV RBC AUTO: 94.8 FL (ref 79–97)
PLATELET # BLD AUTO: 311 10*3/MM3 (ref 140–450)
PMV BLD AUTO: 9.3 FL (ref 6–12)
RBC # BLD AUTO: 3.48 10*6/MM3 (ref 4.14–5.8)
WBC NRBC COR # BLD AUTO: 5.72 10*3/MM3 (ref 3.4–10.8)

## 2025-07-10 PROCEDURE — 36415 COLL VENOUS BLD VENIPUNCTURE: CPT

## 2025-07-10 PROCEDURE — 85027 COMPLETE CBC AUTOMATED: CPT

## 2025-07-14 ENCOUNTER — READMISSION MANAGEMENT (OUTPATIENT)
Dept: CALL CENTER | Facility: HOSPITAL | Age: 60
End: 2025-07-14
Payer: COMMERCIAL

## 2025-07-14 NOTE — OUTREACH NOTE
Medical Week 1 Survey      Flowsheet Row Responses   [facility] facility patient discharged from? [facility]   Does the patient have one of the following disease processes/diagnoses(primary or secondary)? Other   Week 1 attempt successful? No   Unsuccessful attempts Attempt 1            Tammy FRAUSTO - Licensed Nurse

## 2025-07-17 ENCOUNTER — TELEPHONE (OUTPATIENT)
Dept: ONCOLOGY | Facility: CLINIC | Age: 60
End: 2025-07-17
Payer: COMMERCIAL

## 2025-07-17 DIAGNOSIS — D64.9 ANEMIA, UNSPECIFIED TYPE: Primary | ICD-10-CM

## 2025-07-17 NOTE — TELEPHONE ENCOUNTER
Received a phone call from the patient. Patient stated [place] and is experiencing symptoms of anemia. He stated his last HGB was better than it previously was therefore he was cleared to go on vacation however the symptoms started today. Pt stated there are not any lab facilities near him however there is a hospital which he verified is able to complete outpatient labs. He stated he is experiencing lightheadedness and pale/mottling of the hands. He stated he has not had any dark stools since he was in the hospital last week. Pt reported that he is not sure if these are true signs of anemia and/or if he should have his labs drawn however he wanted to check with us. I informed him that I will verify with Dr. Kearney then call him back. He confirmed.     1620: After speaking with Dr. Kearney, I contacted the patient and informed him that Dr. Kearney does want him to have a CBC checked therefore I will contact the hospital that he wishes to go to so I can verify where the order needs to be sent. Patient stated he wants to go to [facility] in [place], [place]. I stated I will contact him back after I speak with them. He v/u.     1623: Contacted Mr. Vang and informed him that I spoke with an employee with the outpatient lab and they informed me that he does not need an appt for the labs therefore he can walk-in to have them completed. I stated that he will need to register at the  for the labs thought. He v/u. I informed him that they close at 1700 this evening therefore if he cannot go today then we request that he go first thing in the morning. He confirmed. I stated that I will contact him tomorrow after we receive the results and it is reviewed by Dr. Kearney. He v/u. Lab order sent to [facility] per patient request.

## 2025-07-21 NOTE — PROGRESS NOTES
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 Carrillo who has been in the hospital twice in the recent past. The first time he came complaining of chest pains, dyspnea and palpitations. He underwent several investigations, including a cardiac catheterization. Imaging revealed calcifications of the aorta. He was discharged with improvement of his symptoms and no indication of severe coronary artery disease. Approximately 12 hours later he developed visual symptoms out of the left eye. He describes some loss of the lateral peripheral vision. He was seen by his optometrist who identified several areas of infarction in his left retina. He was admitted with concern of stroke. This was ruled out at the time of admission. 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 I was asked to see him for \"hypercoagulable condition\". He has no previous history of thrombosis and has never needed anticoagulation. He has no family history of thrombosis although one of his sisters had, reportedly, an ischemic stroke at a young age. On exam he is a well-built man who does not seem in distress. He is conversant and oriented. There is no jaundice or pallor. The gaze is conjugate and the pupils are equally reactive to light. No oral lesions. No palpable lymphadenopathy. The lungs are clear bilaterally and the heart regular. Abdomen is soft. No edema. Laboratory exams reviewed. I have requested lupus anticoagulant and anticardiolipin and antibeta 2 glycoprotein antibodies only because these could change the conduct. However it is well-described that coronary catheterization can result in embolic stroke and " embolic occlusion of the retinal artery. The frequency of these complications is not high, however, it is a well-known potential problem of any arterial catheter procedures. It is actually more common in coronary angiogram, particularly with the transradial route. I do not believe that the possible chronic thrombophlebitis of the saphenous veins and the retinal artery occlusion are related and I do not believe that he needs anticoagulation. He has been started on rivaroxaban which he can continue for the next 3 to 4 weeks and then discontinue unless the lupus anticoagulant is positive, in which case he would need a different anticoagulant. Discussed at length with him and with his family.    6/19/2025: In the office for follow-up.  He persists with the same visual changes and has had no improvement but is not any worse either.  He has been afebrile.  He has continued to experience discomfort on the anterior chest.  It is not particularly different as compared to before and he does not know whether this is the result of pyrosis secondary to reflux or anxiety.  He has minimal dyspnea.  He has not been coughing.  He denies any chest pains or abdominal pain.  Has returned to his usual activities though he has not working yet.  No edema.  On exam he is alert and conversant.  Oriented.  No distress no jaundice.  Lungs clear bilaterally.  Heart regular.  Abdomen soft and without hepatomegaly or splenomegaly.  No edema.  Laboratory exams reviewed and discussed with him.  While the anticardiolipin and antibeta 2 glycoprotein antibody testing is negative the lupus anticoagulant is reported positive.  Whether this is the result of the timing of the collection of the sample or he indeed has lupus anticoagulant positivity is unclear at this time.  It is important to notice that his aPTT is well within the normal range.  Explained the importance of follow-up.  For now continue on the same anticoagulant.  Sent a new prescription  to his pharmacy.    7/23/2025: Patient seen today for follow-up after recent hospitalization.  He was hospitalized 7/3/2025 through 7/7/2025 due to melena.  He underwent EGD at that time which showed minimal erythema and no source of bleeding.  Colonoscopy was not done during this hospitalization but he previously had one completed about 18 months ago that was unremarkable.  He was noted to be iron deficient and received 125 mg of ferric gluconate.  His hemoglobin stabilized and he was discharged in stable condition.  He was restarted on his rivaroxaban and PPI.  Today patient reports feeling fatigued.  He reports no further dark stools or bright red blood per rectum but reports some constipation.  His last bowel movement was yesterday.  He denies any hematuria.  On exam he is alert and conversant.  No acute distress.  No jaundice nor pallor noted.  Lungs clear bilaterally.  Heart regular.  Abdomen soft.  No edema noted.  Laboratory exams reviewed and discussed with him and his spouse.  Discussed additional iron infusions and recommend follow-up with gastroenterology.    Past Medical History:   Diagnosis Date    Laryngopharyngeal reflux (LPR)      Past Surgical History:   Procedure Laterality Date    CARDIAC CATHETERIZATION N/A 5/16/2025    Procedure: Left Heart Cath radial;  Surgeon: Richard Moore DO;  Location: [facility] CATH INVASIVE LOCATION;  Service: Cardiovascular;  Laterality: N/A;    COLONOSCOPY      COLONOSCOPY N/A 1/29/2024    Procedure: COLONOSCOPY WITH POLYPECTOMY X1;  Surgeon: Tre Fitzpatrick MD;  Location: [facility] ENDOSCOPY;  Service: Gastroenterology;  Laterality: N/A;  POST: POLYP    ENDOSCOPY N/A 1/29/2024    Procedure: ESOPHAGOGASTRODUODENOSCOPY WITH BIOPSY X1 AREA AND DILATION UP TO 50;  Surgeon: Tre Fitzpatrick MD;  Location: [facility] ENDOSCOPY;  Service: Gastroenterology;  Laterality: N/A;  POST: GASTRITIS, ESOPHAGEAL STRICTURE    ENDOSCOPY N/A 7/5/2025    Procedure:  ESOPHAGOGASTRODUODENOSCOPY;  Surgeon: YG Bettencourt MD;  Location: [facility] ENDOSCOPY;  Service: Gastroenterology;  Laterality: N/A;  POST- GASTRITIS       Current Outpatient Medications:     acetaminophen (TYLENOL) 325 MG tablet, Take 2 tablets by mouth Every 6 (Six) Hours As Needed for Mild Pain., Disp: , Rfl:     aspirin 81 MG EC tablet, Take 1 tablet by mouth Daily., Disp: 30 tablet, Rfl: 5    atorvastatin (LIPITOR) 80 MG tablet, Take 1 tablet by mouth Every Night., Disp: 90 tablet, Rfl: 3    desvenlafaxine (PRISTIQ) 50 MG 24 hr tablet, Take 1 tablet by mouth Daily., Disp: , Rfl:     LORazepam (ATIVAN) 0.5 MG tablet, TAKE 1 OR 2 TABLETS BY MOUTH 2 TIMES A DAY AS NEEDED, Disp: , Rfl:     omeprazole (priLOSEC) 40 MG capsule, Take 1 capsule by mouth Daily., Disp: , Rfl:     rivaroxaban (XARELTO) 20 MG tablet, Take 1 tablet by mouth Every Night., Disp: , Rfl:     No Known Allergies    Family History   Problem Relation Age of Onset    Cancer Father      Cancer-related family history includes Cancer in his father.    Social History     Tobacco Use    Smoking status: Never     Passive exposure: Never    Smokeless tobacco: Never   Vaping Use    Vaping status: Never Used   Substance Use Topics    Alcohol use: Yes     Comment: Occasional    Drug use: Never     Social History     Social History Narrative    Not on file      ROS:   Review of Systems   Constitutional:  Positive for fatigue. Negative for activity change, appetite change, chills, diaphoresis, fever and unexpected weight change.   HENT:  Negative for congestion, dental problem, drooling, ear discharge, ear pain, facial swelling, hearing loss, mouth sores, nosebleeds, postnasal drip, rhinorrhea, sinus pressure, sinus pain, sneezing, sore throat, tinnitus, trouble swallowing and voice change.    Eyes:  Negative for photophobia, pain, discharge, redness, itching and visual disturbance.   Respiratory:  Negative for apnea, cough, choking, chest tightness,  "shortness of breath, wheezing and stridor.    Cardiovascular:  Negative for chest pain, palpitations and leg swelling.   Gastrointestinal:  Positive for constipation. Negative for abdominal distention, abdominal pain, anal bleeding, blood in stool, diarrhea, nausea, rectal pain and vomiting.   Endocrine: Negative for cold intolerance, heat intolerance, polydipsia and polyuria.   Genitourinary:  Negative for decreased urine volume, difficulty urinating, dysuria, flank pain, frequency, genital sores, hematuria and urgency.   Musculoskeletal:  Negative for arthralgias, back pain, gait problem, joint swelling, myalgias, neck pain and neck stiffness.   Skin:  Negative for color change, pallor and rash.   Neurological:  Negative for dizziness, tremors, seizures, syncope, facial asymmetry, speech difficulty, weakness, light-headedness, numbness and headaches.        Loss of the lateral field of vision on the left.   Hematological:  Negative for adenopathy. Does not bruise/bleed easily.   Psychiatric/Behavioral:  Negative for agitation, behavioral problems, confusion, decreased concentration, hallucinations, self-injury, sleep disturbance and suicidal ideas. The patient is not nervous/anxious.      Objective:    Vital signs:  Vitals:    07/23/25 1445   BP: 119/68   Pulse: 83   Temp: 98.1 °F (36.7 °C)   TempSrc: Temporal   SpO2: 100%   Weight: 78.2 kg (172 lb 6.4 oz)   Height: 182.9 cm (72\")   PainSc: 0-No pain       Body mass index is 23.38 kg/m².    ECOG Status  (1) Restricted in physically strenuous activity, ambulatory and able to do work of light nature    Physical Exam  Vitals and nursing note reviewed.   Constitutional:       General: He is not in acute distress.     Appearance: He is not diaphoretic.   HENT:      Head: Normocephalic and atraumatic.   Eyes:      General: No scleral icterus.        Right eye: No discharge.         Left eye: No discharge.      Conjunctiva/sclera: Conjunctivae normal.   Neck:      " Thyroid: No thyromegaly.   Cardiovascular:      Rate and Rhythm: Normal rate and regular rhythm.      Heart sounds: Normal heart sounds.      No friction rub. No gallop.   Pulmonary:      Effort: Pulmonary effort is normal. No respiratory distress.      Breath sounds: No stridor. No wheezing.   Abdominal:      General: Bowel sounds are normal.      Palpations: Abdomen is soft. There is no mass.      Tenderness: There is no abdominal tenderness. There is no guarding or rebound.   Musculoskeletal:         General: No tenderness. Normal range of motion.      Cervical back: Normal range of motion and neck supple.   Lymphadenopathy:      Cervical: No cervical adenopathy.   Skin:     General: Skin is warm.      Coloration: Skin is not jaundiced or pale.      Findings: No erythema or rash.   Neurological:      Mental Status: He is alert and oriented to person, place, and time.      Motor: No abnormal muscle tone.   Psychiatric:         Mood and Affect: Mood normal.         Behavior: Behavior normal.         Thought Content: Thought content normal.         Judgment: Judgment normal.         Lab Results - Last 18 Months   Lab Units 07/23/25  1459 07/10/25  1502 07/07/25  0535   WBC 10*3/mm3 5.35 5.72 4.87   HEMOGLOBIN g/dL 9.4* 10.6* 11.1*   HEMATOCRIT % 29.5* 33.0* 33.9*   PLATELETS 10*3/mm3 345 311 285   MCV fL 93.4 94.8 92.4     Lab Results - Last 18 Months   Lab Units 07/07/25  0535 07/06/25  0526 07/05/25  0042 07/04/25  0446 07/03/25  1652 05/19/25  2216 05/19/25  1223 05/15/25  0041 05/14/25 2015   SODIUM mmol/L 140 141 141   < > 139   < > 135*   < > 140   POTASSIUM mmol/L 4.1 3.5 4.1   < > 4.0   < > 3.8   < > 4.7   CHLORIDE mmol/L 106 111* 107   < > 102   < > 98   < > 104   CO2 mmol/L 24.1 21.1* 25.0   < > 25.5   < > 27.0   < > 27.7   BUN mg/dL 13.1 9.9 13.5   < > 16.4   < > 12   < > 14   CREATININE mg/dL 1.20 1.04 1.45*   < > 1.34*   < > 1.21   < > 1.32*   CALCIUM mg/dL 9.0 7.3* 8.4*   < > 9.5   < > 9.5   < > 9.5    BILIRUBIN mg/dL  --   --   --   --  0.5  --  0.5  --  0.4   ALK PHOS U/L  --   --   --   --  92  --  90  --  95   ALT (SGPT) U/L  --   --   --   --  21  --  18  --  17   AST (SGOT) U/L  --   --   --   --  29  --  22  --  23   GLUCOSE mg/dL 103* 91 122*   < > 112*   < > 124*   < > 102*    < > = values in this interval not displayed.     Lab Results   Component Value Date    GLUCOSE 103 (H) 07/07/2025    BUN 13.1 07/07/2025    CREATININE 1.20 07/07/2025    BCR 10.9 07/07/2025    K 4.1 07/07/2025    CO2 24.1 07/07/2025    CALCIUM 9.0 07/07/2025    ALBUMIN 4.8 07/03/2025    AST 29 07/03/2025    ALT 21 07/03/2025     Lab Results - Last 18 Months   Lab Units 07/05/25  0042 05/24/25  1748 05/19/25  1223   INR  1.04 1.50* 1.88*   APTT seconds  --  32.8  --      Lab Results   Component Value Date    FOLATE 8.37 05/20/2025     Lab Results   Component Value Date    [id number] 838 05/19/2025     Lab Results   Component Value Date    SEDRATE 3 05/19/2025     Lab Results   Component Value Date    PTT 32.8 05/24/2025    INR 1.04 07/05/2025     Lab Results   Component Value Date    SEDRATE 3 05/19/2025     Assessment & Plan     ASSESSMENT  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  Again discussed with him.  While this is not a common complication, it is a well-described complication of coronary catheter procedures.  This does not have a pattern consistent with an increased predisposition to thrombosis even when chronic thrombosis of distal veins of the left lower extremity was reported on a recent Doppler.  The clinical picture is most consistent with atheroembolism.  However, because of the positivity of the lupus anticoagulant I believe that closer follow-up and repeat testing are in order.  For now continue on the same anticoagulant and see me with new results.  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  Due to downtrending  hemoglobin with clear iron deficiency as well as concern for GI intolerance of oral iron, will proceed with iron infusions  Reviewed records from recent admission to the hospital.  Reviewed all the laboratory exams.  Discussed with patient and his wife.    Follow-up with Dr. Kearney as previously scheduled, sooner if condition indicates    Electronically signed by Keyla Ramos PA-C

## 2025-07-23 ENCOUNTER — LAB (OUTPATIENT)
Dept: LAB | Facility: HOSPITAL | Age: 60
End: 2025-07-23
Payer: COMMERCIAL

## 2025-07-23 ENCOUNTER — OFFICE VISIT (OUTPATIENT)
Dept: ONCOLOGY | Facility: CLINIC | Age: 60
End: 2025-07-23
Payer: COMMERCIAL

## 2025-07-23 VITALS
HEIGHT: 72 IN | HEART RATE: 83 BPM | SYSTOLIC BLOOD PRESSURE: 119 MMHG | WEIGHT: 172.4 LBS | DIASTOLIC BLOOD PRESSURE: 68 MMHG | TEMPERATURE: 98.1 F | OXYGEN SATURATION: 100 % | BODY MASS INDEX: 23.35 KG/M2

## 2025-07-23 DIAGNOSIS — D64.9 ANEMIA, UNSPECIFIED TYPE: ICD-10-CM

## 2025-07-23 DIAGNOSIS — K21.9 LARYNGOPHARYNGEAL REFLUX (LPR): ICD-10-CM

## 2025-07-23 DIAGNOSIS — D64.9 ANEMIA, UNSPECIFIED TYPE: Primary | ICD-10-CM

## 2025-07-23 PROBLEM — D50.0 IRON DEFICIENCY ANEMIA DUE TO CHRONIC BLOOD LOSS: Status: ACTIVE | Noted: 2025-07-23

## 2025-07-23 LAB
BASOPHILS # BLD AUTO: 0.07 10*3/MM3 (ref 0–0.2)
BASOPHILS NFR BLD AUTO: 1.3 % (ref 0–1.5)
DEPRECATED RDW RBC AUTO: 41.2 FL (ref 37–54)
EOSINOPHIL # BLD AUTO: 0.26 10*3/MM3 (ref 0–0.4)
EOSINOPHIL NFR BLD AUTO: 4.9 % (ref 0.3–6.2)
ERYTHROCYTE [DISTWIDTH] IN BLOOD BY AUTOMATED COUNT: 12 % (ref 12.3–15.4)
FOLATE SERPL-MCNC: 5.41 NG/ML (ref 4.78–24.2)
HCT VFR BLD AUTO: 29.5 % (ref 37.5–51)
HGB BLD-MCNC: 9.4 G/DL (ref 13–17.7)
IMM GRANULOCYTES # BLD AUTO: 0 10*3/MM3 (ref 0–0.05)
IMM GRANULOCYTES NFR BLD AUTO: 0 % (ref 0–0.5)
LYMPHOCYTES # BLD AUTO: 1.69 10*3/MM3 (ref 0.7–3.1)
LYMPHOCYTES NFR BLD AUTO: 31.6 % (ref 19.6–45.3)
MCH RBC QN AUTO: 29.7 PG (ref 26.6–33)
MCHC RBC AUTO-ENTMCNC: 31.9 G/DL (ref 31.5–35.7)
MCV RBC AUTO: 93.4 FL (ref 79–97)
MONOCYTES # BLD AUTO: 0.41 10*3/MM3 (ref 0.1–0.9)
MONOCYTES NFR BLD AUTO: 7.7 % (ref 5–12)
NEUTROPHILS NFR BLD AUTO: 2.92 10*3/MM3 (ref 1.7–7)
NEUTROPHILS NFR BLD AUTO: 54.5 % (ref 42.7–76)
PLATELET # BLD AUTO: 345 10*3/MM3 (ref 140–450)
PMV BLD AUTO: 8.9 FL (ref 6–12)
RBC # BLD AUTO: 3.16 10*6/MM3 (ref 4.14–5.8)
VIT B12 BLD-MCNC: 576 PG/ML (ref 211–946)
WBC NRBC COR # BLD AUTO: 5.35 10*3/MM3 (ref 3.4–10.8)

## 2025-07-23 PROCEDURE — 36415 COLL VENOUS BLD VENIPUNCTURE: CPT

## 2025-07-23 PROCEDURE — 82746 ASSAY OF FOLIC ACID SERUM: CPT | Performed by: PHYSICIAN ASSISTANT

## 2025-07-23 PROCEDURE — 85025 COMPLETE CBC W/AUTO DIFF WBC: CPT

## 2025-07-23 PROCEDURE — 82607 VITAMIN B-12: CPT | Performed by: PHYSICIAN ASSISTANT

## 2025-07-24 ENCOUNTER — LAB (OUTPATIENT)
Dept: LAB | Facility: HOSPITAL | Age: 60
End: 2025-07-24
Payer: COMMERCIAL

## 2025-07-24 ENCOUNTER — TELEPHONE (OUTPATIENT)
Dept: ONCOLOGY | Facility: CLINIC | Age: 60
End: 2025-07-24
Payer: COMMERCIAL

## 2025-07-24 LAB — HEMOCCULT STL QL IA: POSITIVE

## 2025-07-24 PROCEDURE — 82274 ASSAY TEST FOR BLOOD FECAL: CPT

## 2025-07-25 ENCOUNTER — HOSPITAL ENCOUNTER (OUTPATIENT)
Dept: ONCOLOGY | Facility: HOSPITAL | Age: 60
Discharge: HOME | End: 2025-07-25
Payer: COMMERCIAL

## 2025-07-25 ENCOUNTER — TELEPHONE (OUTPATIENT)
Dept: ONCOLOGY | Facility: CLINIC | Age: 60
End: 2025-07-25
Payer: COMMERCIAL

## 2025-07-25 VITALS
HEIGHT: 72 IN | RESPIRATION RATE: 18 BRPM | TEMPERATURE: 98.1 F | DIASTOLIC BLOOD PRESSURE: 75 MMHG | BODY MASS INDEX: 23.35 KG/M2 | OXYGEN SATURATION: 98 % | HEART RATE: 84 BPM | SYSTOLIC BLOOD PRESSURE: 129 MMHG | WEIGHT: 172.4 LBS

## 2025-07-25 DIAGNOSIS — D50.0 IRON DEFICIENCY ANEMIA DUE TO CHRONIC BLOOD LOSS: Primary | ICD-10-CM

## 2025-07-25 DIAGNOSIS — D64.9 ANEMIA, UNSPECIFIED TYPE: ICD-10-CM

## 2025-07-25 LAB
BASOPHILS # BLD AUTO: 0.05 10*3/MM3 (ref 0–0.2)
BASOPHILS NFR BLD AUTO: 0.9 % (ref 0–1.5)
DEPRECATED RDW RBC AUTO: 41.7 FL (ref 37–54)
EOSINOPHIL # BLD AUTO: 0.17 10*3/MM3 (ref 0–0.4)
EOSINOPHIL NFR BLD AUTO: 2.9 % (ref 0.3–6.2)
ERYTHROCYTE [DISTWIDTH] IN BLOOD BY AUTOMATED COUNT: 11.9 % (ref 12.3–15.4)
HCT VFR BLD AUTO: 28.5 % (ref 37.5–51)
HGB BLD-MCNC: 9.1 G/DL (ref 13–17.7)
IMM GRANULOCYTES # BLD AUTO: 0.01 10*3/MM3 (ref 0–0.05)
IMM GRANULOCYTES NFR BLD AUTO: 0.2 % (ref 0–0.5)
LYMPHOCYTES # BLD AUTO: 1.71 10*3/MM3 (ref 0.7–3.1)
LYMPHOCYTES NFR BLD AUTO: 29.1 % (ref 19.6–45.3)
MCH RBC QN AUTO: 29.7 PG (ref 26.6–33)
MCHC RBC AUTO-ENTMCNC: 31.9 G/DL (ref 31.5–35.7)
MCV RBC AUTO: 93.1 FL (ref 79–97)
MONOCYTES # BLD AUTO: 0.44 10*3/MM3 (ref 0.1–0.9)
MONOCYTES NFR BLD AUTO: 7.5 % (ref 5–12)
NEUTROPHILS NFR BLD AUTO: 3.5 10*3/MM3 (ref 1.7–7)
NEUTROPHILS NFR BLD AUTO: 59.4 % (ref 42.7–76)
PLATELET # BLD AUTO: 350 10*3/MM3 (ref 140–450)
PMV BLD AUTO: 9.4 FL (ref 6–12)
RBC # BLD AUTO: 3.06 10*6/MM3 (ref 4.14–5.8)
WBC NRBC COR # BLD AUTO: 5.88 10*3/MM3 (ref 3.4–10.8)

## 2025-07-25 PROCEDURE — 96365 THER/PROPH/DIAG IV INF INIT: CPT

## 2025-07-25 PROCEDURE — 25010000002 IRON SUCROSE PER 1 MG: Performed by: PHYSICIAN ASSISTANT

## 2025-07-25 PROCEDURE — 85025 COMPLETE CBC W/AUTO DIFF WBC: CPT | Performed by: PHYSICIAN ASSISTANT

## 2025-07-25 RX ORDER — DIPHENHYDRAMINE HYDROCHLORIDE 50 MG/ML
50 INJECTION, SOLUTION INTRAMUSCULAR; INTRAVENOUS AS NEEDED
OUTPATIENT
Start: 2025-08-15

## 2025-07-25 RX ORDER — SODIUM CHLORIDE 9 MG/ML
20 INJECTION, SOLUTION INTRAVENOUS ONCE
OUTPATIENT
Start: 2025-08-01

## 2025-07-25 RX ORDER — FAMOTIDINE 10 MG/ML
20 INJECTION, SOLUTION INTRAVENOUS AS NEEDED
OUTPATIENT
Start: 2025-08-08

## 2025-07-25 RX ORDER — SODIUM CHLORIDE 9 MG/ML
20 INJECTION, SOLUTION INTRAVENOUS ONCE
Status: DISCONTINUED | OUTPATIENT
Start: 2025-07-25 | End: 2025-07-26 | Stop reason: HOSPADM

## 2025-07-25 RX ORDER — SODIUM CHLORIDE 9 MG/ML
20 INJECTION, SOLUTION INTRAVENOUS ONCE
OUTPATIENT
Start: 2025-08-15

## 2025-07-25 RX ORDER — HYDROCORTISONE SODIUM SUCCINATE 100 MG/2ML
100 INJECTION INTRAMUSCULAR; INTRAVENOUS AS NEEDED
Status: CANCELLED | OUTPATIENT
Start: 2025-07-25

## 2025-07-25 RX ORDER — HYDROCORTISONE SODIUM SUCCINATE 100 MG/2ML
100 INJECTION INTRAMUSCULAR; INTRAVENOUS AS NEEDED
OUTPATIENT
Start: 2025-08-22

## 2025-07-25 RX ORDER — DIPHENHYDRAMINE HYDROCHLORIDE 50 MG/ML
50 INJECTION, SOLUTION INTRAMUSCULAR; INTRAVENOUS AS NEEDED
OUTPATIENT
Start: 2025-08-08

## 2025-07-25 RX ORDER — SODIUM CHLORIDE 9 MG/ML
20 INJECTION, SOLUTION INTRAVENOUS ONCE
Status: CANCELLED | OUTPATIENT
Start: 2025-07-25

## 2025-07-25 RX ORDER — FAMOTIDINE 10 MG/ML
20 INJECTION, SOLUTION INTRAVENOUS AS NEEDED
OUTPATIENT
Start: 2025-08-01

## 2025-07-25 RX ORDER — HYDROCORTISONE SODIUM SUCCINATE 100 MG/2ML
100 INJECTION INTRAMUSCULAR; INTRAVENOUS AS NEEDED
OUTPATIENT
Start: 2025-08-08

## 2025-07-25 RX ORDER — SODIUM CHLORIDE 9 MG/ML
20 INJECTION, SOLUTION INTRAVENOUS ONCE
OUTPATIENT
Start: 2025-08-08

## 2025-07-25 RX ORDER — DIPHENHYDRAMINE HYDROCHLORIDE 50 MG/ML
50 INJECTION, SOLUTION INTRAMUSCULAR; INTRAVENOUS AS NEEDED
OUTPATIENT
Start: 2025-08-01

## 2025-07-25 RX ORDER — FAMOTIDINE 10 MG/ML
20 INJECTION, SOLUTION INTRAVENOUS AS NEEDED
OUTPATIENT
Start: 2025-08-15

## 2025-07-25 RX ORDER — FAMOTIDINE 10 MG/ML
20 INJECTION, SOLUTION INTRAVENOUS AS NEEDED
OUTPATIENT
Start: 2025-08-22

## 2025-07-25 RX ORDER — FAMOTIDINE 10 MG/ML
20 INJECTION, SOLUTION INTRAVENOUS AS NEEDED
Status: CANCELLED | OUTPATIENT
Start: 2025-07-25

## 2025-07-25 RX ORDER — DIPHENHYDRAMINE HYDROCHLORIDE 50 MG/ML
50 INJECTION, SOLUTION INTRAMUSCULAR; INTRAVENOUS AS NEEDED
Status: CANCELLED | OUTPATIENT
Start: 2025-07-25

## 2025-07-25 RX ORDER — DIPHENHYDRAMINE HYDROCHLORIDE 50 MG/ML
50 INJECTION, SOLUTION INTRAMUSCULAR; INTRAVENOUS AS NEEDED
OUTPATIENT
Start: 2025-08-22

## 2025-07-25 RX ORDER — HYDROCORTISONE SODIUM SUCCINATE 100 MG/2ML
100 INJECTION INTRAMUSCULAR; INTRAVENOUS AS NEEDED
OUTPATIENT
Start: 2025-08-15

## 2025-07-25 RX ORDER — SODIUM CHLORIDE 9 MG/ML
20 INJECTION, SOLUTION INTRAVENOUS ONCE
OUTPATIENT
Start: 2025-08-22

## 2025-07-25 RX ORDER — HYDROCORTISONE SODIUM SUCCINATE 100 MG/2ML
100 INJECTION INTRAMUSCULAR; INTRAVENOUS AS NEEDED
OUTPATIENT
Start: 2025-08-01

## 2025-07-25 RX ADMIN — IRON SUCROSE 200 MG: 20 INJECTION, SOLUTION INTRAVENOUS at 14:27

## 2025-07-25 NOTE — TELEPHONE ENCOUNTER
Caller: Flip Vang    Relationship: Self    Best call back number: [phone]     What is the best time to reach you: ASAP TODAY    Who are you requesting to speak with (clinical staff, provider,  specific staff member): CLINICAL      What was the call regarding: PT IS HAVING COLONOSCOPY 7/29 NEEDS TO KNOW WHEN TO STOP XARELTO, SAYS THEY TOLD HIM TO VERIFY WITH HEMATOLOGY.  HUB UNABLE TO WARM TRANSFER, PLEASE CALL PT TODAY TO ADVISE BEFORE THE WEEKEND WHEN TO STOP THIS EXACTLY.

## 2025-07-25 NOTE — TELEPHONE ENCOUNTER
Contacted the patient in regards to his message we received. Patient stated it was advised for him to contact his GI MD due to having blood in his stool therefore he did and the GI MD stated he is agreeable with doing a colonoscopy which they scheduled for Tuesday 7/29/25. Patient confirmed that the appt was scheduled today. I informed him that I will verify with Dr. Kearney. He confirmed. Patient stated he has not received Lovenox outpatient.     Spoke with Dr. Kearney. Per MD, patient to hold Xarelto for 2 days prior to procedure and resume as directed by the gastroenterologist. No Lovenox bridge required.     Informed the patient of the instructions per Dr. Kearney. I informed him that he will take his last dose of Xarelto tomorrow 7/26/25 then hold on Sunday, Monday then Tuesday morning. I advised for him to verify with the GI MD regarding when they are agreeable with him resuming post-op. He v/u. He stated he was informed that it is possible that the Xarelto is causing the bleeding however it is necessary to go through these steps. I stated we will need to see what the results of the colonoscopy are then depending on the results, it will need to be addressed regarding if the Xarelto is causing the bleeding or not. He v/u. I advised for him to let us know if he has any questions or concerns. He confirmed.

## 2025-07-30 ENCOUNTER — READMISSION MANAGEMENT (OUTPATIENT)
Dept: CALL CENTER | Facility: HOSPITAL | Age: 60
End: 2025-07-30
Payer: COMMERCIAL

## 2025-07-30 NOTE — OUTREACH NOTE
Medical Week 3 Survey      Flowsheet Row Responses   [facility] facility patient discharged from? [facility]   Does the patient have one of the following disease processes/diagnoses(primary or secondary)? Other   Week 3 attempt successful? No   Unsuccessful attempts Attempt 1   Revoke Other  [unable to reach]   Discharge diagnosis *Melena            [facility] - Registered Nurse

## 2025-08-01 ENCOUNTER — OFFICE VISIT (OUTPATIENT)
Dept: CARDIOLOGY | Facility: CLINIC | Age: 60
End: 2025-08-01
Payer: COMMERCIAL

## 2025-08-01 ENCOUNTER — HOSPITAL ENCOUNTER (OUTPATIENT)
Dept: ONCOLOGY | Facility: HOSPITAL | Age: 60
Discharge: HOME | End: 2025-08-01
Payer: COMMERCIAL

## 2025-08-01 VITALS
OXYGEN SATURATION: 99 % | DIASTOLIC BLOOD PRESSURE: 84 MMHG | WEIGHT: 171.6 LBS | HEART RATE: 104 BPM | SYSTOLIC BLOOD PRESSURE: 154 MMHG | TEMPERATURE: 97.3 F | RESPIRATION RATE: 20 BRPM | BODY MASS INDEX: 23.24 KG/M2 | HEIGHT: 72 IN

## 2025-08-01 VITALS
OXYGEN SATURATION: 99 % | DIASTOLIC BLOOD PRESSURE: 75 MMHG | BODY MASS INDEX: 23.3 KG/M2 | HEART RATE: 101 BPM | SYSTOLIC BLOOD PRESSURE: 128 MMHG | WEIGHT: 172 LBS | HEIGHT: 72 IN

## 2025-08-01 DIAGNOSIS — D50.0 IRON DEFICIENCY ANEMIA DUE TO CHRONIC BLOOD LOSS: Primary | ICD-10-CM

## 2025-08-01 DIAGNOSIS — I47.10 PSVT (PAROXYSMAL SUPRAVENTRICULAR TACHYCARDIA): Primary | ICD-10-CM

## 2025-08-01 DIAGNOSIS — D64.9 ANEMIA, UNSPECIFIED TYPE: ICD-10-CM

## 2025-08-01 PROCEDURE — 99213 OFFICE O/P EST LOW 20 MIN: CPT | Performed by: INTERNAL MEDICINE

## 2025-08-01 PROCEDURE — 96365 THER/PROPH/DIAG IV INF INIT: CPT

## 2025-08-01 PROCEDURE — 25010000002 IRON SUCROSE PER 1 MG: Performed by: PHYSICIAN ASSISTANT

## 2025-08-01 RX ORDER — SODIUM CHLORIDE 9 MG/ML
20 INJECTION, SOLUTION INTRAVENOUS ONCE
Status: DISCONTINUED | OUTPATIENT
Start: 2025-08-01 | End: 2025-08-02 | Stop reason: HOSPADM

## 2025-08-01 RX ADMIN — IRON SUCROSE 200 MG: 20 INJECTION, SOLUTION INTRAVENOUS at 14:28

## 2025-08-01 NOTE — PROGRESS NOTES
Cardiology Office Visit      Encounter Date:  2025    PATIENT IDENTIFICATION    Name: Flip Vang  Age: 60 y.o. Sex: male : 1965  MRN: [id number]    Reason For Followup:  SVT    Brief Clinical History:  Dear Aristeo You MD    I had the pleasure of seeing Flip Vang today. As you are well aware, this is a 60 y.o. male with no established history of ischemic heart disease.  He does have a history of hypertension, hyperlipidemia, retinal artery occlusion, and SVT.  He presents today for follow-up on the above conditions.    Interval History:  2025                                           He is a 60-year-old male who presented to the emergency room at [facility] on May 14, 2025 with complaints of chest pain and dyspnea.  He reported feeling tired, dizzy and having some left arm numbness and tingling.  His blood pressure was elevated at home.  Patient had recent EGD due to some epigastric and abdominal discomfort.     Patient underwent stress Myoview today stress Myoview showed no evidence of ischemia.  Patient had echocardiogram done today which showed his EF to be 55%.  There was mild MR otherwise no significant valvular flow abnormalities.     In 2024 patient had a CT calcium score that was 39.2.  39.2 was in the LAD other arteries were 0.     Patient is treated for hypertension and takes lisinopril at home.     On admission MI was ruled out.  TSH was within normal limits.  Total cholesterol 222, HDL 56  triglycerides 161.  Hemoglobin was 11.5 D-dimer was elevated at 1.38 CTA of his chest PE protocol was negative for PE.  There was no acute intrathoracic process.  He subsequently underwent heart catheterization on 2025 via right radial approach with no angiographic evidence for significant epicardial occlusive disease and normal LV systolic function with EF 60-65%.  Medical therapy and risk factor modification were recommended.     He presented back to the  hospital after awakening the next day with visual symptoms in his left eye.  He was seen by his ophthalmologist and examination was suggestive of retinal artery occlusion and possible embolic phenomena.  Neurology evaluated the patient-MRI brain showed no defects.  Transesophageal echocardiogram was performed on 5/30/2025 with negative saline test, no evidence of left atrial thrombus or mass.  Left atrial appendage was multi lobar in nature best described as windsock with no evidence of left atrial appendage thrombus.  He presented to the ED on 5/24/2025 with complaint of sharp pain in his left forearm at cath site followed by significant swelling.  He had been feeling well and went back to work at half-strength without difficulty.  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  He applied pressure to the site for about 20 minutes which did decrease the swelling.  He was seen by vascular surgeon Dr. Mcdonald.  Right upper extremity CTA was performed with no evidence of pseudoaneurysm or thrombus.  The patient was discharged home was seen in outpatient follow-up 5/30/2025.  Note reviewed: Follow-up duplex showed a thrombosed small pseudoaneurysm and patent radial artery.  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  Follow-up 1 month for another ultrasound for surveillance to ensure complete resolution of the bruising and pseudoaneurysm.     Patient had been fitted with ambulatory monitor to evaluate for any evidence of abnormal heart rhythm that may have contributed to his symptoms.  Results were reviewed with the patient today:     Basic rhythm is sinus with a min HR of 58 bpm, max HR of 250 bpm, and avg HR of 87 bpm.  2 Supraventricular Tachycardia runs occurred, the run with the fastest interval lasting 20 beats with a max rate of 250 bpm (avg 197 bpm); the run with the fastest interval was also the longest.  Some  "episodes of Supraventricular Tachycardia conducted with possible aberrancy.  Supraventricular Tachycardia was detected within +/- 45 seconds of symptomatic patient event(s).  Isolated VEs were rare (<1.0%), VE Couplets were rare (<1.0%), and no VE Triplets were present.  Rare Ventricular Bigeminy was present.     No pauses were noted.     No evidence for AV blocks was seen.     Patient also underwent hypercoagulable workup which was reviewed-dilute viper venom time elevated at 9.2 seconds.    08/01/2025        He is currently being worked up for hypercoagulable state.  He has evidence of strokes.  He wore an ambulatory ECG monitor that had very rapid heart rate but upon closer inspection demonstrates SVT with no evidence of atrial fibrillation.  He does have some palpitations that he associates with his PVCs.  He is currently on anticoagulation at the direction of hematology.  He does not have a cardiac indication for DOAC therapy.     Impressions:  Recent suspected retinal artery occlusion with embolic phenomena  SVT on ambulatory monitor-brief  Primary hypertension  Dyslipidemia     Recommendations:  Anticoagulation as per hematology    We reviewed his monitor and there was some concern that there was an episode of Afib. It looks like fast SVT. He is anticoagulated on Xarelto.     Diagnoses and all orders for this visit:    1. PSVT (paroxysmal supraventricular tachycardia) (Primary)          Objective:    Vitals:  Vitals:    08/01/25 1301   BP: 128/75   Pulse: 101   SpO2: 99%   Weight: 78 kg (172 lb)   Height: 182.9 cm (72\")     Body mass index is 23.33 kg/m².      Physical Exam:    General: Alert, cooperative, no distress, appears stated age  Head:  Normocephalic, atraumatic, mucous membranes moist  Eyes:  Conjunctiva/corneas clear, EOM's intact     Neck:  Supple,  no bruit    Lungs: Clear to auscultation bilaterally, no wheezes rhonchi rales are noted  Chest wall: No tenderness  Heart::  Regular rate and rhythm, " S1 and S2 normal, 1/6 holosystolic murmur.  No rub or gallop  Abdomen: Soft, non-tender, nondistended bowel sounds active  Extremities: No cyanosis, clubbing, or edema  Pulses: 2+ and symmetric all extremities  Skin:  No rashes or lesions  Neuro/psych: A&O x3. CN II through XII are grossly intact with appropriate affect      Allergies:  No Known Allergies    Medication Review:     Current Outpatient Medications:     acetaminophen (TYLENOL) 325 MG tablet, Take 2 tablets by mouth Every 6 (Six) Hours As Needed for Mild Pain., Disp: , Rfl:     aspirin 81 MG EC tablet, Take 1 tablet by mouth Daily., Disp: 30 tablet, Rfl: 5    atorvastatin (LIPITOR) 80 MG tablet, Take 1 tablet by mouth Every Night., Disp: 90 tablet, Rfl: 3    desvenlafaxine (PRISTIQ) 50 MG 24 hr tablet, Take 1 tablet by mouth Daily., Disp: , Rfl:     LORazepam (ATIVAN) 0.5 MG tablet, TAKE 1 OR 2 TABLETS BY MOUTH 2 TIMES A DAY AS NEEDED, Disp: , Rfl:     omeprazole (priLOSEC) 40 MG capsule, Take 1 capsule by mouth Daily., Disp: , Rfl:     rivaroxaban (XARELTO) 20 MG tablet, Take 1 tablet by mouth Every Night., Disp: , Rfl:   No current facility-administered medications for this visit.    Facility-Administered Medications Ordered in Other Visits:     sodium chloride 0.9 % infusion, 20 mL/hr, Intravenous, Once, Keyla Ramos PA-C    Family History:  Family History   Problem Relation Age of Onset    Cancer Father        Past Medical History:  Past Medical History:   Diagnosis Date    Laryngopharyngeal reflux (LPR)        Past Surgical History:  Past Surgical History:   Procedure Laterality Date    CARDIAC CATHETERIZATION N/A 5/16/2025    Procedure: Left Heart Cath radial;  Surgeon: Richard Moore DO;  Location: [facility] CATH INVASIVE LOCATION;  Service: Cardiovascular;  Laterality: N/A;    COLONOSCOPY      COLONOSCOPY N/A 1/29/2024    Procedure: COLONOSCOPY WITH POLYPECTOMY X1;  Surgeon: Tre Fitzpatrick MD;  Location: [facility]  ENDOSCOPY;  Service: Gastroenterology;  Laterality: N/A;  POST: POLYP    ENDOSCOPY N/A 1/29/2024    Procedure: ESOPHAGOGASTRODUODENOSCOPY WITH BIOPSY X1 AREA AND DILATION UP TO 50;  Surgeon: Tre Fitzpatrick MD;  Location: [facility] ENDOSCOPY;  Service: Gastroenterology;  Laterality: N/A;  POST: GASTRITIS, ESOPHAGEAL STRICTURE    ENDOSCOPY N/A 7/5/2025    Procedure: ESOPHAGOGASTRODUODENOSCOPY;  Surgeon: YG Bettencourt MD;  Location: [facility] ENDOSCOPY;  Service: Gastroenterology;  Laterality: N/A;  POST- GASTRITIS       Social History:  Social History     Socioeconomic History    Marital status: Single   Tobacco Use    Smoking status: Never     Passive exposure: Never    Smokeless tobacco: Never   Vaping Use    Vaping status: Never Used   Substance and Sexual Activity    Alcohol use: Yes     Comment: Occasional    Drug use: Never    Sexual activity: Defer       Review of Systems:  The following systems were reviewed as they relate to the cardiovascular system: Constitutional, Eyes, ENT, Cardiovascular, Respiratory, Gastrointestinal, Integumentary, Neurological, Psychiatric, Hematologic, Endocrine, Musculoskeletal, and Genitourinary. The pertinent cardiovascular findings are reported above with all other cardiovascular points within those systems being negative.    Diagnostic Study Review:     Current Electrocardiogram:  Procedures no new EKG.  EKG dated 19 May 2025 demonstrates sinus rhythm with a ventricular rate of 82 bpm.    Laboratory Data:  Lab Results   Component Value Date    GLUCOSE 103 (H) 07/07/2025    BUN 13.1 07/07/2025    CREATININE 1.20 07/07/2025    BCR 10.9 07/07/2025    K 4.1 07/07/2025    CO2 24.1 07/07/2025    CALCIUM 9.0 07/07/2025    ALBUMIN 4.8 07/03/2025    AST 29 07/03/2025    ALT 21 07/03/2025     Lab Results   Component Value Date    GLUCOSE 103 (H) 07/07/2025    CALCIUM 9.0 07/07/2025     07/07/2025    K 4.1 07/07/2025    CO2 24.1 07/07/2025     07/07/2025    BUN 13.1  07/07/2025    CREATININE 1.20 07/07/2025    BCR 10.9 07/07/2025    ANIONGAP 9.9 07/07/2025     Lab Results   Component Value Date    WBC 5.88 07/25/2025    HGB 9.1 (L) 07/25/2025    HCT 28.5 (L) 07/25/2025    MCV 93.1 07/25/2025     07/25/2025     Lab Results   Component Value Date    CHOL 222 (H) 05/15/2025    TRIG 161 (H) 05/15/2025    HDL 56 05/15/2025     (H) 05/15/2025     Lab Results   Component Value Date    HGBA1C 5.37 05/19/2025     Lab Results   Component Value Date    INR 1.04 07/05/2025    INR 1.50 (H) 05/24/2025    INR 1.88 (H) 05/19/2025    PROTIME 13.5 07/05/2025    PROTIME 18.1 (H) 05/24/2025    PROTIME 21.7 (H) 05/19/2025       Most Recent Echo:  Results for orders placed during the hospital encounter of 05/19/25    Adult Transesophageal Echo (DARREN) W/ Cont if Necessary Per Protocol (Cardiology Department) 05/30/2025 12:53 PM    Interpretation Summary    Left ventricular systolic function is normal. Left ventricular ejection fraction appears to be 56 - 60%.    Left ventricular diastolic function was normal.    Saline test results are negative.       Most Recent Stress Test:  Results for orders placed during the hospital encounter of 05/14/25    Stress Test With Myocardial Perfusion One Day 05/15/2025 10:39 AM    Interpretation Summary    Myocardial perfusion imaging indicates a normal myocardial perfusion study with no evidence of ischemia. Impressions are consistent with a low risk study.    Left ventricular ejection fraction is normal (Calculated EF = 63%).    Low risk for ischemic heart disease.    This is normal Cardiolite imaging stress test with no evidence of ischemia or myocardial infarction.  Left ventricle size and function is normal on gated SPECT imaging.  No wall motion abnormality was noted.  Clinical correlation recommended.  Further recommendation as per ordering physician. .    Findings consistent with a normal ECG stress test.       Most Recent Cardiac  Catheterization:   Results for orders placed during the hospital encounter of 25    Cardiac Catheterization/Vascular Study    Conclusion  Table formatting from the original result was not included.  Cardiac Catheterization Operative Report  PATIENT IDENTIFICATION  Name: Flip Vang  Age: 59 y.o. Sex: male : 1965  MRN: [id number]    Date: 2025  PCP: @PCP@    Requesting Provider  [unfilled]    He underwent cardiac catheterization.    Indications for the procedure include: chest pain.    Procedure Details:  The risks, benefits, complications, treatment options, and expected outcomes were discussed with the patient. The patient and/or family concurred with the proposed plan, giving informed consent.    After informed consent the patient was brought to the cath lab after appropriate IV hydration was begun and oral premedication was given. He was further sedated with fentanyl and midazolam. He was prepped and draped in the usual manner. Using the modified Seldinger access technique, a 6 Serbian sheath was placed in the radial artery. A left heart catheterization with coronary arteriography was performed. Findings are discussed below.    After the procedure was completed, sedation was stopped and the sheaths and catheters were all removed. Hemostasis was achieved per established hospital protocols.    Conscious sedation:  Conscious sedation was performed according to protocol.  I supervised and directed an independent trained observer with the assistance of monitoring the patient's level of consciousness and physiologic status throughout the procedure.  Intraoperative service time was 39 minutes.    Findings:    Hemodynamics LVEDP: 12  LV: 127/5  AO: 133/80  Left Main No significant disease  RCA No significant disease  LAD No significant disease  Circ No significant disease  SVG(s) Not applicable  ALMAS Not applicable  LV Normal left ventricular systolic function ejection fraction 60-65%  Coronary Dominance  "RCA    Estimated Blood Loss:  Minimal    Specimens: None    Complications:  None; patient tolerated the procedure well.    Disposition: PACU - hemodynamically stable    Condition: stable    Impressions:  No angiographic evidence for significant epicardial occlusive disease  Normal left ventricular systolic function ejection fraction 60 to 65%    Recommendations:  Medical therapy and risk factor modification       NOTE: The following portions of the patient's note were reviewed, confirmed and/or updated this visit as appropriate: History of present illness/Interval history, physical examination, assessment & plan, allergies, current medications, past family history, past medical history, past social history, past surgical history and problem list.    Labs pertinent to today's visit on 08/01/2025 (including but not limited to CBC, CMP, and lipid profiles) were requested from the patient's primary care provider/hospital/clinical laboratory.  If the labs were available for the visit, they were reviewed with the patient.  If they were not available, when received, special interest will be made to the labs pertinent to this visit.  The patient's most recent \"in-house\" labs are noted below and have been reviewed.  Outside labs pertinent to this visit are scanned into the record and have been reviewed.    Discussions held today, 08/01/2025,regarding procedures included risk, benefits, and options including but not limited to: Death, MI, stroke, pain, bleeding, infection, and possible need for vascular/thoracic/cardiothoracic surgery.    Copied information within this note was reviewed and is current as of 08/01/2025.    Assessment and plan noted herein represents the current plan of care as of 08/01/2025.    Significant resources from our office and staff are inherent in engaging this patient in a continuous and active collaborative plan of care related to their chronic cardiovascular conditions outlined herein.  The " management of these conditions requires the direction of our service with specialized clinical knowledge, skills, and experience.  This collaborative care includes but is not limited to patient education, expectations and responsibilities, shared decision making around therapeutic goals, and shared commitments to achieve those goals.

## 2025-08-01 NOTE — PROGRESS NOTES
Pt here for Venofer infusion. Pt reports having iron infusion in the past with no reactions. IV started with blood return noted and flushed with NS. Pt tolerated infusion without any difficulties. Pt declined to stay for observation. Vitals WNL and IV removed. Pt declined AVS and d/c by self.

## 2025-08-11 ENCOUNTER — HOSPITAL ENCOUNTER (OUTPATIENT)
Dept: ONCOLOGY | Facility: HOSPITAL | Age: 60
Discharge: HOME | End: 2025-08-11
Admitting: PHYSICIAN ASSISTANT
Payer: COMMERCIAL

## 2025-08-11 ENCOUNTER — PATIENT ROUNDING (BHMG ONLY) (OUTPATIENT)
Dept: CARDIOLOGY | Facility: CLINIC | Age: 60
End: 2025-08-11
Payer: COMMERCIAL

## 2025-08-11 ENCOUNTER — OFFICE VISIT (OUTPATIENT)
Dept: CARDIOLOGY | Facility: CLINIC | Age: 60
End: 2025-08-11
Payer: COMMERCIAL

## 2025-08-11 VITALS
BODY MASS INDEX: 22.89 KG/M2 | HEART RATE: 77 BPM | OXYGEN SATURATION: 99 % | DIASTOLIC BLOOD PRESSURE: 80 MMHG | WEIGHT: 169 LBS | HEIGHT: 72 IN | SYSTOLIC BLOOD PRESSURE: 133 MMHG

## 2025-08-11 VITALS
SYSTOLIC BLOOD PRESSURE: 138 MMHG | TEMPERATURE: 96.8 F | HEART RATE: 105 BPM | RESPIRATION RATE: 18 BRPM | HEIGHT: 72 IN | BODY MASS INDEX: 22.84 KG/M2 | DIASTOLIC BLOOD PRESSURE: 75 MMHG | WEIGHT: 168.6 LBS | OXYGEN SATURATION: 100 %

## 2025-08-11 DIAGNOSIS — H53.9 VISUAL DISTURBANCE: ICD-10-CM

## 2025-08-11 DIAGNOSIS — E78.2 MIXED HYPERLIPIDEMIA: ICD-10-CM

## 2025-08-11 DIAGNOSIS — D64.9 ANEMIA, UNSPECIFIED TYPE: ICD-10-CM

## 2025-08-11 DIAGNOSIS — R00.2 PALPITATIONS: ICD-10-CM

## 2025-08-11 DIAGNOSIS — I47.10 PSVT (PAROXYSMAL SUPRAVENTRICULAR TACHYCARDIA): Primary | ICD-10-CM

## 2025-08-11 DIAGNOSIS — D50.0 IRON DEFICIENCY ANEMIA DUE TO CHRONIC BLOOD LOSS: Primary | ICD-10-CM

## 2025-08-11 DIAGNOSIS — I80.00 SUPERFICIAL THROMBOPHLEBITIS OF LOWER EXTREMITY, UNSPECIFIED LATERALITY: ICD-10-CM

## 2025-08-11 DIAGNOSIS — H34.9 RETINAL ARTERY OCCLUSION: ICD-10-CM

## 2025-08-11 LAB
BASOPHILS # BLD AUTO: 0.06 10*3/MM3 (ref 0–0.2)
BASOPHILS NFR BLD AUTO: 1 % (ref 0–1.5)
DEPRECATED RDW RBC AUTO: 42.8 FL (ref 37–54)
EOSINOPHIL # BLD AUTO: 0.1 10*3/MM3 (ref 0–0.4)
EOSINOPHIL NFR BLD AUTO: 1.7 % (ref 0.3–6.2)
ERYTHROCYTE [DISTWIDTH] IN BLOOD BY AUTOMATED COUNT: 12.8 % (ref 12.3–15.4)
HCT VFR BLD AUTO: 32.8 % (ref 37.5–51)
HGB BLD-MCNC: 10.4 G/DL (ref 13–17.7)
IMM GRANULOCYTES # BLD AUTO: 0 10*3/MM3 (ref 0–0.05)
IMM GRANULOCYTES NFR BLD AUTO: 0 % (ref 0–0.5)
LYMPHOCYTES # BLD AUTO: 1.69 10*3/MM3 (ref 0.7–3.1)
LYMPHOCYTES NFR BLD AUTO: 28.6 % (ref 19.6–45.3)
MCH RBC QN AUTO: 28.8 PG (ref 26.6–33)
MCHC RBC AUTO-ENTMCNC: 31.7 G/DL (ref 31.5–35.7)
MCV RBC AUTO: 90.9 FL (ref 79–97)
MONOCYTES # BLD AUTO: 0.4 10*3/MM3 (ref 0.1–0.9)
MONOCYTES NFR BLD AUTO: 6.8 % (ref 5–12)
NEUTROPHILS NFR BLD AUTO: 3.66 10*3/MM3 (ref 1.7–7)
NEUTROPHILS NFR BLD AUTO: 61.9 % (ref 42.7–76)
PLATELET # BLD AUTO: 326 10*3/MM3 (ref 140–450)
PMV BLD AUTO: 9.7 FL (ref 6–12)
RBC # BLD AUTO: 3.61 10*6/MM3 (ref 4.14–5.8)
WBC NRBC COR # BLD AUTO: 5.91 10*3/MM3 (ref 3.4–10.8)

## 2025-08-11 PROCEDURE — 85025 COMPLETE CBC W/AUTO DIFF WBC: CPT

## 2025-08-11 PROCEDURE — 25010000002 IRON SUCROSE PER 1 MG: Performed by: PHYSICIAN ASSISTANT

## 2025-08-11 PROCEDURE — 99214 OFFICE O/P EST MOD 30 MIN: CPT | Performed by: INTERNAL MEDICINE

## 2025-08-11 PROCEDURE — 96365 THER/PROPH/DIAG IV INF INIT: CPT

## 2025-08-11 PROCEDURE — 36415 COLL VENOUS BLD VENIPUNCTURE: CPT

## 2025-08-11 PROCEDURE — 96374 THER/PROPH/DIAG INJ IV PUSH: CPT

## 2025-08-11 RX ORDER — SODIUM CHLORIDE 9 MG/ML
20 INJECTION, SOLUTION INTRAVENOUS ONCE
Status: DISCONTINUED | OUTPATIENT
Start: 2025-08-11 | End: 2025-08-12 | Stop reason: HOSPADM

## 2025-08-11 RX ADMIN — IRON SUCROSE 200 MG: 20 INJECTION, SOLUTION INTRAVENOUS at 14:25

## 2025-08-18 ENCOUNTER — HOSPITAL ENCOUNTER (OUTPATIENT)
Dept: ONCOLOGY | Facility: HOSPITAL | Age: 60
Discharge: HOME | End: 2025-08-18
Admitting: PHYSICIAN ASSISTANT
Payer: COMMERCIAL

## 2025-08-18 VITALS
HEART RATE: 85 BPM | OXYGEN SATURATION: 99 % | HEIGHT: 72 IN | DIASTOLIC BLOOD PRESSURE: 72 MMHG | WEIGHT: 174.2 LBS | TEMPERATURE: 98 F | RESPIRATION RATE: 18 BRPM | SYSTOLIC BLOOD PRESSURE: 132 MMHG | BODY MASS INDEX: 23.6 KG/M2

## 2025-08-18 DIAGNOSIS — D50.0 IRON DEFICIENCY ANEMIA DUE TO CHRONIC BLOOD LOSS: Primary | ICD-10-CM

## 2025-08-18 DIAGNOSIS — D64.9 ANEMIA, UNSPECIFIED TYPE: ICD-10-CM

## 2025-08-18 PROCEDURE — 25010000002 IRON SUCROSE PER 1 MG: Performed by: PHYSICIAN ASSISTANT

## 2025-08-18 PROCEDURE — 96374 THER/PROPH/DIAG INJ IV PUSH: CPT

## 2025-08-18 PROCEDURE — 96365 THER/PROPH/DIAG IV INF INIT: CPT

## 2025-08-18 RX ORDER — SODIUM CHLORIDE 9 MG/ML
20 INJECTION, SOLUTION INTRAVENOUS ONCE
Status: DISCONTINUED | OUTPATIENT
Start: 2025-08-18 | End: 2025-08-19 | Stop reason: HOSPADM

## 2025-08-18 RX ADMIN — SODIUM CHLORIDE 200 MG: 9 INJECTION, SOLUTION INTRAVENOUS at 14:29

## 2025-08-19 ENCOUNTER — TELEPHONE (OUTPATIENT)
Dept: CARDIOLOGY | Facility: CLINIC | Age: 60
End: 2025-08-19
Payer: COMMERCIAL

## 2025-08-25 ENCOUNTER — HOSPITAL ENCOUNTER (OUTPATIENT)
Dept: ONCOLOGY | Facility: HOSPITAL | Age: 60
Discharge: HOME | End: 2025-08-25
Admitting: PHYSICIAN ASSISTANT
Payer: COMMERCIAL

## 2025-08-25 VITALS
BODY MASS INDEX: 23.73 KG/M2 | HEIGHT: 72 IN | RESPIRATION RATE: 20 BRPM | SYSTOLIC BLOOD PRESSURE: 132 MMHG | DIASTOLIC BLOOD PRESSURE: 82 MMHG | HEART RATE: 91 BPM | OXYGEN SATURATION: 99 % | TEMPERATURE: 98.4 F | WEIGHT: 175.2 LBS

## 2025-08-25 DIAGNOSIS — D50.0 IRON DEFICIENCY ANEMIA DUE TO CHRONIC BLOOD LOSS: Primary | ICD-10-CM

## 2025-08-25 DIAGNOSIS — D64.9 ANEMIA, UNSPECIFIED TYPE: ICD-10-CM

## 2025-08-25 PROCEDURE — 96374 THER/PROPH/DIAG INJ IV PUSH: CPT

## 2025-08-25 PROCEDURE — 25010000002 IRON SUCROSE PER 1 MG: Performed by: PHYSICIAN ASSISTANT

## 2025-08-25 PROCEDURE — 96365 THER/PROPH/DIAG IV INF INIT: CPT

## 2025-08-25 RX ORDER — SODIUM CHLORIDE 9 MG/ML
20 INJECTION, SOLUTION INTRAVENOUS ONCE
Status: DISCONTINUED | OUTPATIENT
Start: 2025-08-25 | End: 2025-08-26 | Stop reason: HOSPADM

## 2025-08-25 RX ADMIN — SODIUM CHLORIDE 200 MG: 9 INJECTION, SOLUTION INTRAVENOUS at 14:36

## 2025-08-29 ENCOUNTER — TELEPHONE (OUTPATIENT)
Dept: ONCOLOGY | Facility: CLINIC | Age: 60
End: 2025-08-29
Payer: COMMERCIAL

## (undated) DEVICE — BITEBLOCK ENDO W/STRAP 60F A/ LF DISP

## (undated) DEVICE — DRAPE, RADIAL, STERILE: Brand: MEDLINE

## (undated) DEVICE — CATH DIAG IMPULSE PIG 5F 100CM

## (undated) DEVICE — SINGLE-USE BIOPSY FORCEPS: Brand: RADIAL JAW 4

## (undated) DEVICE — PK ENDO GI 50

## (undated) DEVICE — PK TRY HEART CATH 50

## (undated) DEVICE — ST ACC MICROPUNCTURE STFF/CANN PLAT/TP 4F 21G 40CM

## (undated) DEVICE — TRAP WIDEEYE POLYP

## (undated) DEVICE — RADIFOCUS OPTITORQUE ANGIOGRAPHIC CATHETER: Brand: OPTITORQUE

## (undated) DEVICE — SNAR POLYP HOTSNARE/BRAIDED OVL/MINI 7F 2.8X10MM 230CM 1P/U

## (undated) DEVICE — GLIDESHEATH SLENDER STAINLESS STEEL KIT: Brand: GLIDESHEATH SLENDER

## (undated) DEVICE — DGW .035 FC J3MM 260CM TEF: Brand: EMERALD

## (undated) DEVICE — TR BAND RADIAL ARTERY COMPRESSION DEVICE: Brand: TR BAND